# Patient Record
Sex: MALE | Race: WHITE | NOT HISPANIC OR LATINO | Employment: OTHER | URBAN - METROPOLITAN AREA
[De-identification: names, ages, dates, MRNs, and addresses within clinical notes are randomized per-mention and may not be internally consistent; named-entity substitution may affect disease eponyms.]

---

## 2017-08-09 ENCOUNTER — APPOINTMENT (OUTPATIENT)
Dept: LAB | Facility: MEDICAL CENTER | Age: 72
End: 2017-08-09
Attending: ANESTHESIOLOGY
Payer: MEDICARE

## 2017-08-09 ENCOUNTER — HOSPITAL ENCOUNTER (OUTPATIENT)
Dept: RADIOLOGY | Facility: MEDICAL CENTER | Age: 72
End: 2017-08-09
Attending: ANESTHESIOLOGY
Payer: MEDICARE

## 2017-08-09 DIAGNOSIS — M51.16 INTERVERTEBRAL DISC DISORDERS WITH RADICULOPATHY, LUMBAR REGION: ICD-10-CM

## 2017-08-09 PROCEDURE — 72148 MRI LUMBAR SPINE W/O DYE: CPT

## 2017-11-27 ENCOUNTER — OFFICE VISIT (OUTPATIENT)
Dept: MEDICAL GROUP | Facility: MEDICAL CENTER | Age: 72
End: 2017-11-27
Payer: MEDICARE

## 2017-11-27 VITALS
HEART RATE: 69 BPM | SYSTOLIC BLOOD PRESSURE: 128 MMHG | HEIGHT: 70 IN | OXYGEN SATURATION: 95 % | WEIGHT: 176 LBS | BODY MASS INDEX: 25.2 KG/M2 | TEMPERATURE: 97.7 F | DIASTOLIC BLOOD PRESSURE: 70 MMHG | RESPIRATION RATE: 16 BRPM

## 2017-11-27 DIAGNOSIS — Z00.00 ROUTINE GENERAL MEDICAL EXAMINATION AT A HEALTH CARE FACILITY: ICD-10-CM

## 2017-11-27 DIAGNOSIS — G89.29 CHRONIC BILATERAL LOW BACK PAIN WITH LEFT-SIDED SCIATICA: ICD-10-CM

## 2017-11-27 DIAGNOSIS — R73.01 IMPAIRED FASTING GLUCOSE: ICD-10-CM

## 2017-11-27 DIAGNOSIS — Z23 NEED FOR PNEUMOCOCCAL VACCINATION: ICD-10-CM

## 2017-11-27 DIAGNOSIS — N52.9 ERECTILE DYSFUNCTION, UNSPECIFIED ERECTILE DYSFUNCTION TYPE: ICD-10-CM

## 2017-11-27 DIAGNOSIS — M54.42 CHRONIC BILATERAL LOW BACK PAIN WITH LEFT-SIDED SCIATICA: ICD-10-CM

## 2017-11-27 PROCEDURE — 90670 PCV13 VACCINE IM: CPT | Performed by: NURSE PRACTITIONER

## 2017-11-27 PROCEDURE — 99214 OFFICE O/P EST MOD 30 MIN: CPT | Mod: 25 | Performed by: NURSE PRACTITIONER

## 2017-11-27 PROCEDURE — G0009 ADMIN PNEUMOCOCCAL VACCINE: HCPCS | Performed by: NURSE PRACTITIONER

## 2017-11-27 RX ORDER — SILDENAFIL 50 MG/1
50 TABLET, FILM COATED ORAL PRN
Qty: 3 TAB | Refills: 11 | Status: SHIPPED | OUTPATIENT
Start: 2017-11-27 | End: 2018-05-23

## 2017-11-27 ASSESSMENT — ENCOUNTER SYMPTOMS: BACK PAIN: 1

## 2017-11-27 NOTE — PROGRESS NOTES
Subjective:      Bayron Parikh is a 72 y.o. male who presents with Other (personal issue)            HPI Bayron Parikh Is here today mainly to discuss treatment for erectile dysfunction.      1. Erectile dysfunction, unspecified erectile dysfunction type  Patient states he has noticed decreased ability to maintain an erection over the past year. He states he is sexually monogamous with one partner. He has no history of diabetes or dyslipidemia and has never had testosterone checked. He is on chronic narcotic pain medication. His last blood work from 2016 was normal except for mild elevation in blood sugar. He would like to try medication for intercourse.    2. Impaired fasting glucose  Most recent blood sugar mildly elevated at 110 and patient advised it needs to be rechecked.    3. Chronic bilateral low back pain with left-sided sciatica  Patient continues to follow with pain management who is prescribing his morphine and oxycodone. He states he plans on decreasing the amount of narcotics he is using regularly but currently needs the medicine for pain control.    4. Routine general medical examination at a health care facility  Patient due for yearly blood work.    5. Need for pneumococcal vaccination  Patient does not remember ever receiving pneumonia vaccine.  Current Outpatient Prescriptions   Medication Sig Dispense Refill   • sildenafil citrate (VIAGRA) 50 MG tablet Take 1 Tab by mouth as needed for Erectile Dysfunction. 3 Tab 11   • morphine ER (MS CONTIN) 15 MG Tab CR tablet Take 1 Tab by mouth 3 times a day.     • oxycodone-acetaminophen (PERCOCET-10)  MG Tab Take 1-2 Tabs by mouth every 6 hours as needed for Severe Pain.       No current facility-administered medications for this visit.      Social History   Substance Use Topics   • Smoking status: Former Smoker     Years: 30.00     Types: Cigarettes     Quit date: 11/1/2000   • Smokeless tobacco: Never Used   • Alcohol use No     Family History  "  Problem Relation Age of Onset   • Cancer Father      throat?   • Other Mother      \"old age\"     Past Medical History:   Diagnosis Date   • Asthma        Review of Systems   Musculoskeletal: Positive for back pain.   All other systems reviewed and are negative.         Objective:     /70   Pulse 69   Temp 36.5 °C (97.7 °F)   Resp 16   Ht 1.778 m (5' 10\")   Wt 79.8 kg (176 lb)   SpO2 95%   BMI 25.25 kg/m²      Physical Exam   Constitutional: He is oriented to person, place, and time. He appears well-developed and well-nourished. No distress.   HENT:   Head: Normocephalic and atraumatic.   Right Ear: External ear normal.   Left Ear: External ear normal.   Nose: Nose normal.   Mouth/Throat: Oropharynx is clear and moist.   Eyes: Conjunctivae are normal. Right eye exhibits no discharge. Left eye exhibits no discharge.   Neck: Normal range of motion. Neck supple. No tracheal deviation present. No thyromegaly present.   Cardiovascular: Normal rate, regular rhythm and normal heart sounds.    No murmur heard.  Pulmonary/Chest: Effort normal and breath sounds normal. No respiratory distress. He has no wheezes. He has no rales.   Lymphadenopathy:     He has no cervical adenopathy.   Neurological: He is alert and oriented to person, place, and time. Coordination normal.   Skin: Skin is warm and dry. No rash noted. He is not diaphoretic. No erythema.   Psychiatric: He has a normal mood and affect. His behavior is normal. Judgment and thought content normal.   Nursing note and vitals reviewed.              Assessment/Plan:     1. Erectile dysfunction, unspecified erectile dysfunction type  I reviewed with patient the benefits and dangers with using medicines such as Viagra and he would like to try the medicine anyway and therefore a prescription was provided today. I would like to also look for underlying dyslipidemia or diabetes. I told him if it does not help her symptoms worsen I would recommend going to " urology.  - sildenafil citrate (VIAGRA) 50 MG tablet; Take 1 Tab by mouth as needed for Erectile Dysfunction.  Dispense: 3 Tab; Refill: 11    2. Impaired fasting glucose  Last blood sugar mildly elevated so I will check for prediabetes.  - HEMOGLOBIN A1C; Future    3. Chronic bilateral low back pain with left-sided sciatica  Patient will continue to follow with pain management was prescribing his medicines and I advised him it would be best if he can wean down on his narcotic medication which can cause side effects.    4. Routine general medical examination at a health care facility  Patient will do lab work before his next visit.  - COMP METABOLIC PANEL; Future  - LIPID PROFILE; Future  - TSH; Future    5. Need for pneumococcal vaccination  I have placed the below orders and discussed them with an approved delegating provider. The MA is performing the below orders under the direction of Dr. Leonardo Alvarado 13 PCV-13

## 2017-12-29 ENCOUNTER — TELEPHONE (OUTPATIENT)
Dept: MEDICAL GROUP | Facility: MEDICAL CENTER | Age: 72
End: 2017-12-29

## 2017-12-29 NOTE — TELEPHONE ENCOUNTER
PVP WITH OUTREACH  Future Appointments       Provider Department Center    1/10/2018 8:00 AM DALLAS Elam; [facility] [address]          ORIGINAL PVP AWV // PVP WITHOUT OUTREACH  ANNUAL WELLNESS VISIT PRE-VISIT PLANNING     1.  Reviewed note from last office visit with PCP: YES Last office visit 11/27/17    2.  If any orders were placed at last visit, do we have Results/Consult Notes?        •  Labs - Labs ordered, completed on 01/03/18 and results are in chart.       •  Imaging - Imaging was not ordered at last office visit.       •  Referrals - No referrals were ordered at last office visit.    3.  Immunizations were updated in [employer] using [employer]?: Yes       •  [employer] Recommendations: TDAP and ZOSTAVAX (Shingles)       •  Is patient due for Tdap? YES. Patient was not notified of copay/out of pocket cost.       •  Is patient due for Shingles? YES. Patient was not notified of copay/out of pocket cost.     4.  Patient is due for the following Health Maintenance Topics:   Health Maintenance Due   Topic Date Due   • Annual Wellness Visit  SCHEDULED FOR 01/10/17   • IMM DTaP/Tdap/Td Vaccine (1 - Tdap) 12/29/2017     5.  Reviewed/Updated the following with patient:       •   Preferred Pharmacy? NO       •   Preferred Lab? NO       •   Preferred Communication? NO       •   Allergies? NO       •   Medications? NO       •   Social History? NO       •   Family History (document living status of immediate family members and if + hx of cancer, diabetes, hypertension, hyperlipidemia, heart attack, stroke) NO    6.  Care Team Updated:       •   DME Company (gait device, O2, CPAP, etc.): NO       •   Other Specialists (eye doctor, derm, GYN, cardiology, endo, etc): NO    7.  Patient has the following Care Path diagnoses on Problem List:  NONE    8.  Specialty Comments was updated with diagnosis information provided by [facility]: [facility]    9.  Patient was not advised: “This is a free wellness visit.  The provider will screen for medical conditions to help you stay healthy. If you have other concerns to address you may be asked to discuss these at a separate visit or there may be an additional fee.”     6.  Patient was NOT informed to arrive 15 min prior to their scheduled appointment and bring in their medication bottles.

## 2018-01-03 ENCOUNTER — HOSPITAL ENCOUNTER (OUTPATIENT)
Dept: LAB | Facility: MEDICAL CENTER | Age: 73
End: 2018-01-03
Attending: NURSE PRACTITIONER
Payer: MEDICARE

## 2018-01-03 DIAGNOSIS — R73.01 IMPAIRED FASTING GLUCOSE: ICD-10-CM

## 2018-01-03 DIAGNOSIS — Z00.00 ROUTINE GENERAL MEDICAL EXAMINATION AT A HEALTH CARE FACILITY: ICD-10-CM

## 2018-01-03 LAB
ALBUMIN SERPL BCP-MCNC: 4.1 G/DL (ref 3.2–4.9)
ALBUMIN/GLOB SERPL: 1.3 G/DL
ALP SERPL-CCNC: 70 U/L (ref 30–99)
ALT SERPL-CCNC: 17 U/L (ref 2–50)
ANION GAP SERPL CALC-SCNC: 5 MMOL/L (ref 0–11.9)
AST SERPL-CCNC: 16 U/L (ref 12–45)
BILIRUB SERPL-MCNC: 0.5 MG/DL (ref 0.1–1.5)
BUN SERPL-MCNC: 17 MG/DL (ref 8–22)
CALCIUM SERPL-MCNC: 9.9 MG/DL (ref 8.5–10.5)
CHLORIDE SERPL-SCNC: 104 MMOL/L (ref 96–112)
CHOLEST SERPL-MCNC: 148 MG/DL (ref 100–199)
CO2 SERPL-SCNC: 30 MMOL/L (ref 20–33)
CREAT SERPL-MCNC: 0.92 MG/DL (ref 0.5–1.4)
EST. AVERAGE GLUCOSE BLD GHB EST-MCNC: 123 MG/DL
GFR SERPL CREATININE-BSD FRML MDRD: >60 ML/MIN/1.73 M 2
GLOBULIN SER CALC-MCNC: 3.2 G/DL (ref 1.9–3.5)
GLUCOSE SERPL-MCNC: 95 MG/DL (ref 65–99)
HBA1C MFR BLD: 5.9 % (ref 0–5.6)
HDLC SERPL-MCNC: 41 MG/DL
LDLC SERPL CALC-MCNC: 82 MG/DL
POTASSIUM SERPL-SCNC: 4.5 MMOL/L (ref 3.6–5.5)
PROT SERPL-MCNC: 7.3 G/DL (ref 6–8.2)
SODIUM SERPL-SCNC: 139 MMOL/L (ref 135–145)
TRIGL SERPL-MCNC: 126 MG/DL (ref 0–149)
TSH SERPL DL<=0.005 MIU/L-ACNC: 1.49 UIU/ML (ref 0.38–5.33)

## 2018-01-03 PROCEDURE — 83036 HEMOGLOBIN GLYCOSYLATED A1C: CPT | Mod: GA

## 2018-01-03 PROCEDURE — 84443 ASSAY THYROID STIM HORMONE: CPT | Mod: GA

## 2018-01-03 PROCEDURE — 80053 COMPREHEN METABOLIC PANEL: CPT

## 2018-01-03 PROCEDURE — 36415 COLL VENOUS BLD VENIPUNCTURE: CPT

## 2018-01-03 PROCEDURE — 80061 LIPID PANEL: CPT | Mod: GA

## 2018-01-09 NOTE — TELEPHONE ENCOUNTER
Left message for patient to call back regarding AWV pre-visit planning. Please transfer call to [phone].

## 2018-01-10 ENCOUNTER — OFFICE VISIT (OUTPATIENT)
Dept: MEDICAL GROUP | Facility: MEDICAL CENTER | Age: 73
End: 2018-01-10
Payer: MEDICARE

## 2018-01-10 VITALS
SYSTOLIC BLOOD PRESSURE: 122 MMHG | HEART RATE: 74 BPM | WEIGHT: 180 LBS | HEIGHT: 68 IN | OXYGEN SATURATION: 92 % | TEMPERATURE: 98.2 F | BODY MASS INDEX: 27.28 KG/M2 | RESPIRATION RATE: 16 BRPM | DIASTOLIC BLOOD PRESSURE: 64 MMHG

## 2018-01-10 DIAGNOSIS — Z23 NEED FOR TDAP VACCINATION: ICD-10-CM

## 2018-01-10 DIAGNOSIS — M54.42 CHRONIC BILATERAL LOW BACK PAIN WITH LEFT-SIDED SCIATICA: ICD-10-CM

## 2018-01-10 DIAGNOSIS — N52.9 ERECTILE DYSFUNCTION, UNSPECIFIED ERECTILE DYSFUNCTION TYPE: ICD-10-CM

## 2018-01-10 DIAGNOSIS — G89.29 CHRONIC BILATERAL LOW BACK PAIN WITH LEFT-SIDED SCIATICA: ICD-10-CM

## 2018-01-10 DIAGNOSIS — L30.9 ECZEMA, UNSPECIFIED TYPE: ICD-10-CM

## 2018-01-10 DIAGNOSIS — F51.01 PRIMARY INSOMNIA: ICD-10-CM

## 2018-01-10 DIAGNOSIS — Z00.00 MEDICARE ANNUAL WELLNESS VISIT, SUBSEQUENT: ICD-10-CM

## 2018-01-10 PROCEDURE — G0439 PPPS, SUBSEQ VISIT: HCPCS | Performed by: NURSE PRACTITIONER

## 2018-01-10 PROCEDURE — 99999 PR ANNUAL WELLNESS VISIT-INCLUDES PPPS SUBSEQUE*: CPT | Performed by: NURSE PRACTITIONER

## 2018-01-10 RX ORDER — FLUOCINONIDE TOPICAL SOLUTION USP, 0.05% 0.5 MG/ML
SOLUTION TOPICAL
COMMUNITY
Start: 2015-07-08 | End: 2018-01-10

## 2018-01-10 RX ORDER — KETOCONAZOLE 20 MG/ML
SHAMPOO TOPICAL
COMMUNITY
Start: 2015-07-08 | End: 2018-05-23

## 2018-01-10 RX ORDER — CLONIDINE HYDROCHLORIDE 0.1 MG/1
0.1 TABLET ORAL 2 TIMES DAILY PRN
Refills: 2 | COMMUNITY
Start: 2017-12-23 | End: 2019-09-25

## 2018-01-10 RX ORDER — DIAZEPAM 10 MG/1
10 TABLET ORAL EVERY 6 HOURS PRN
Qty: 30 TAB | Refills: 0 | COMMUNITY
Start: 2018-01-10 | End: 2019-02-13

## 2018-01-10 ASSESSMENT — PAIN SCALES - GENERAL: PAINLEVEL: 2=MINIMAL-SLIGHT

## 2018-01-10 ASSESSMENT — PATIENT HEALTH QUESTIONNAIRE - PHQ9: CLINICAL INTERPRETATION OF PHQ2 SCORE: 0

## 2018-01-10 NOTE — PROGRESS NOTES
Chief Complaint   Patient presents with   • Annual Wellness Visit         HPI:  Bayron is a 72 y.o. here for Medicare Annual Wellness Visit       1. Medicare annual wellness visit, subsequent  Screening performed below.    2. Erectile dysfunction, unspecified erectile dysfunction type  Patient has age related erectile dysfunction and it may also be compounded by his back pain and narcotics. He was given a prescription for Viagra on his last visit which he states he had used in the past successfully. He has not filled this yet due to cost.    3. Chronic bilateral low back pain with left-sided sciatica  Patient continues to follow with pain management for low back pain secondary to an injury years ago. He is currently on Percocet, morphine and Valium through pain management. He also gets occasional epidurals which help short-term. He denies loss of bowel or bladder function.    4. Eczema, unspecified type  Patient has had eczema for years and uses a steroid cream as needed.    5. Primary insomnia  Patient currently on Valium which he states he uses a half to one tablet when necessary through pain management.        Patient Active Problem List    Diagnosis Date Noted   • Erectile dysfunction 11/27/2017   • Chronic bilateral low back pain with left-sided sciatica 12/08/2016   • Eczema 12/08/2016   • Insomnia 12/08/2016       Current Outpatient Prescriptions   Medication Sig Dispense Refill   • fluocinonide (LIDEX) 0.05 % external solution      • ketoconazole (NIZORAL) 2 % shampoo      • clonidine (CATAPRES) 0.1 MG Tab Take 0.1 mg by mouth 2 Times a Day.  2   • sildenafil citrate (VIAGRA) 50 MG tablet Take 1 Tab by mouth as needed for Erectile Dysfunction. 3 Tab 11   • morphine ER (MS CONTIN) 15 MG Tab CR tablet Take 1 Tab by mouth 3 times a day.     • oxycodone-acetaminophen (PERCOCET-10)  MG Tab Take 1-2 Tabs by mouth every 6 hours as needed for Severe Pain.       No current facility-administered medications for  this visit.         Patient is taking medications as noted in medication list.  Current supplements as per medication list.     Allergies: Patient has no known allergies.    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 No, due for TDAP and ZOSTAVAX (Shingles). Patient is interested in receiving NONE today.    He  reports that he quit smoking about 17 years ago. His smoking use included Cigarettes. He quit after 30.00 years of use. He has never used smokeless tobacco. He reports that he does not drink alcohol or use drugs.  Counseling given: Not Answered        DPA/Advanced directive: Patient has Advanced Directive, but it is not on file. Instructed to bring in a copy to scan into their chart.    ROS:    Gait: Uses no assistive device   Ostomy: no   Other tubes: no   Amputations: no   Chronic oxygen use no   Last eye exam 6 months ago   Wears hearing aids: no   : Denies any urinary leakage during the last 6 months       Depression Screening    Little interest or pleasure in doing things?  0 - not at all  Feeling down, depressed, or hopeless? 0 - not at all  Patient Health Questionnaire Score: 0    If depressive symptoms identified deferred to follow up visit unless specifically addressed in assessment and plan.    Interpretation of PHQ-9 Total Score   Score Severity   1-4 No Depression   5-9 Mild Depression   10-14 Moderate Depression   15-19 Moderately Severe Depression   20-27 Severe Depression    Screening for Cognitive Impairment    Three Minute Recall (apple, watch, lani)  3/3 Village, Kitchen, Baby  Draw clock face with all 12 numbers set to the hand to show 10 minutes past 11 o'clock  0 3/5 Time 3:40  If cognitive concerns identified, deferred for follow up unless specifically addressed in assessment and plan.    Fall Risk Assessment    Has the patient had two or more falls in the last year or any fall with injury in the  "last year?  No  If fall risk identified, deferred for follow up unless specifically addressed in assessment and plan.    Safety Assessment    Throw rugs on floor.  No  Handrails on all stairs.  Yes  Good lighting in all hallways.  Yes  Difficulty hearing.  No  Patient counseled about all safety risks that were identified.    Functional Assessment ADLs    Are there any barriers preventing you from cooking for yourself or meeting nutritional needs?  No.    Are there any barriers preventing you from driving safely or obtaining transportation?  No.    Are there any barriers preventing you from using a telephone or calling for help?  No.    Are there any barriers preventing you from shopping?  No.    Are there any barriers preventing you from taking care of your own finances?  No.    Are there any barriers preventing you from managing your medications?  No.    Are you currently engaging any exercise or physical activity?  Yes.  Walking 3-4x per wk    Health Maintenance Summary                IMM DTaP/Tdap/Td Vaccine Overdue 5/1/1962     Annual Wellness Visit Overdue 12/29/2017      Done 12/28/2016 Visit Dx: Medicare annual wellness visit, subsequent    IMM ZOSTER VACCINE Postponed 12/12/2018 Originally 5/1/2003. Patient Refused    COLONOSCOPY Next Due 11/21/2018      Done 11/21/2008     IMM PNEUMOCOCCAL 65+ (ADULT) LOW/MEDIUM RISK SERIES Next Due 11/27/2018      Done 11/27/2017 Imm Admin: Pneumococcal Conjugate Vaccine (Prevnar/PCV-13)          Patient Care Team:  DALLAS Elam as PCP - General (Family Medicine)  Jaret Saunders M.D. (Anesthesia)    Social History   Substance Use Topics   • Smoking status: Former Smoker     Years: 30.00     Types: Cigarettes     Quit date: 11/1/2000   • Smokeless tobacco: Never Used   • Alcohol use No     Family History   Problem Relation Age of Onset   • Cancer Father      throat?   • Other Mother      \"old age\"     He  has a past medical history of Asthma.   No past surgical " "history on file.        Exam:     Blood pressure 122/64, pulse 74, temperature 36.8 °C (98.2 °F), resp. rate 16, height 1.727 m (5' 8\"), weight 81.6 kg (180 lb), SpO2 92 %. Body mass index is 27.37 kg/m².    Hearing excellent.    Dentition upper and lower dentures  Alert, oriented in no acute distress.  Eye contact is good, speech goal directed, affect calm      Assessment and Plan. The following treatment and monitoring plan is recommended:        1. Medicare annual wellness visit, subsequent  Annual wellness topics discussed, review of chronic medical problems completed    - Annual Wellness Visit - Includes PPPS Subsequent ()    2. Erectile dysfunction, unspecified erectile dysfunction type  Patient has a prescription for medication to use if needed and reports no side effects from the medicine.    3. Chronic bilateral low back pain with left-sided sciatica  Patient will continue to get her controlled substances from pain management. I did advise him to consider weaning off the diazepam because of the potential interactions with his narcotics.    4. Eczema, unspecified type  Patient will continue with steroid cream as needed.    5. Primary insomnia  Medication listed below is prescribed through pain management and I recommended weaning off the medicine and considering more natural ways to help sleep.  - diazepam (VALIUM) 10 MG tablet; Take 1 Tab by mouth every 6 hours as needed for Anxiety.  Dispense: 30 Tab; Refill: 0    6. Need for Tdap vaccination  Patient left before receiving Td.  - Misc. Devices Misc; TDaP IM through pharmacy  Dispense: 1 Each; Refill: 11  Services suggested: No services needed at this time  Health Care Screening recommendations as per orders if indicated.  Referrals offered: PT/OT/Nutrition counseling/Behavioral Health/Smoking cessation as per orders if indicated.    Discussion today about general wellness and lifestyle habits:    · Prevent falls and reduce trip hazards; Cautioned " about securing or removing rugs.  · Have a working fire alarm and carbon monoxide detector;   · Engage in regular physical activity and social activities       Follow-up: No Follow-up on file.

## 2018-03-27 ENCOUNTER — OFFICE VISIT (OUTPATIENT)
Dept: MEDICAL GROUP | Facility: MEDICAL CENTER | Age: 73
End: 2018-03-27
Payer: MEDICARE

## 2018-03-27 VITALS
BODY MASS INDEX: 27.28 KG/M2 | TEMPERATURE: 98.6 F | HEART RATE: 76 BPM | SYSTOLIC BLOOD PRESSURE: 124 MMHG | HEIGHT: 68 IN | WEIGHT: 180 LBS | OXYGEN SATURATION: 94 % | RESPIRATION RATE: 16 BRPM | DIASTOLIC BLOOD PRESSURE: 76 MMHG

## 2018-03-27 DIAGNOSIS — B02.9 HERPES ZOSTER WITHOUT COMPLICATION: ICD-10-CM

## 2018-03-27 PROCEDURE — 99214 OFFICE O/P EST MOD 30 MIN: CPT | Performed by: FAMILY MEDICINE

## 2018-03-27 RX ORDER — VALACYCLOVIR HYDROCHLORIDE 1 G/1
1000 TABLET, FILM COATED ORAL 3 TIMES DAILY
Qty: 21 TAB | Refills: 0 | Status: SHIPPED | OUTPATIENT
Start: 2018-03-27 | End: 2018-04-03

## 2018-03-27 NOTE — PROGRESS NOTES
cc: Rash    Subjective:     Bayron Parikh is a 72 y.o. male presenting with a rash. Started yesterday on his left chest and back. Seems to be somewhat worse today, has developed some blisters now. It is quite itchy and painful. Has tried nothing for this. Denies any swelling, fevers, lesions elsewhere. He has had the chickenpox in the past. He believes he's had the shingles vaccine in the past, but is not sure.    Review of systems:  See above.         Current Outpatient Prescriptions:   •  valacyclovir (VALTREX) 1 GM Tab, Take 1 Tab by mouth 3 times a day for 7 days., Disp: 21 Tab, Rfl: 0  •  ketoconazole (NIZORAL) 2 % shampoo, , Disp: , Rfl:   •  clonidine (CATAPRES) 0.1 MG Tab, Take 0.1 mg by mouth 2 Times a Day., Disp: , Rfl: 2  •  diazepam (VALIUM) 10 MG tablet, Take 1 Tab by mouth every 6 hours as needed for Anxiety., Disp: 30 Tab, Rfl: 0  •  sildenafil citrate (VIAGRA) 50 MG tablet, Take 1 Tab by mouth as needed for Erectile Dysfunction., Disp: 3 Tab, Rfl: 11  •  morphine ER (MS CONTIN) 15 MG Tab CR tablet, Take 1 Tab by mouth 3 times a day., Disp: , Rfl:   •  oxycodone-acetaminophen (PERCOCET-10)  MG Tab, Take 1-2 Tabs by mouth every 6 hours as needed for Severe Pain., Disp: , Rfl:     No Known Allergies    Past Medical History:   Diagnosis Date   • Asthma      History reviewed. No pertinent surgical history.  Family History   Problem Relation Age of Onset   • Cancer Father      Esophogial/smoker   • No Known Problems Mother    • No Known Problems Brother    • No Known Problems Son    • No Known Problems Daughter    • No Known Problems Daughter    • No Known Problems Maternal Grandmother    • No Known Problems Maternal Grandfather    • No Known Problems Paternal Grandmother    • No Known Problems Paternal Grandfather      Social History     Social History   • Marital status: Single     Spouse name: N/A   • Number of children: N/A   • Years of education: N/A     Occupational History   • Not on file.  "    Social History Main Topics   • Smoking status: Former Smoker     Packs/day: 1.00     Years: 39.00     Types: Cigarettes     Quit date: 11/1/2000   • Smokeless tobacco: Never Used      Comment: started smoking at age 16   • Alcohol use No   • Drug use: No   • Sexual activity: Yes     Partners: Female     Other Topics Concern   • Not on file     Social History Narrative   • No narrative on file       Objective:     Vitals: /76   Pulse 76   Temp 37 °C (98.6 °F)   Resp 16   Ht 1.727 m (5' 8\")   Wt 81.6 kg (180 lb)   SpO2 94%   BMI 27.37 kg/m²   General: Alert, pleasant, NAD  HEENT: Normocephalic.    Skin: Warm, dry. Clustered fluid-filled papules on an erythematous base scattered in a T5 dermatomal distribution on his left chest and back   Psych:  Affect/mood is normal, judgement is good, memory is intact, grooming is appropriate.    Assessment/Plan:     Bayron was seen today for herpes zoster.    Diagnoses and all orders for this visit:    Herpes zoster without complication  Discussed shingles diagnosis, avoiding exposure to others. We'll also start a course of Valtrex. He is already on chronic pain medications. Could consider adding gabapentin if he has any residual postherpetic neuralgia.  -     valacyclovir (VALTREX) 1 GM Tab; Take 1 Tab by mouth 3 times a day for 7 days.        Return if symptoms worsen or fail to improve.  "

## 2018-05-23 ENCOUNTER — HOSPITAL ENCOUNTER (INPATIENT)
Facility: MEDICAL CENTER | Age: 73
LOS: 4 days | DRG: 871 | End: 2018-05-27
Attending: EMERGENCY MEDICINE | Admitting: HOSPITALIST
Payer: MEDICARE

## 2018-05-23 ENCOUNTER — APPOINTMENT (OUTPATIENT)
Dept: RADIOLOGY | Facility: MEDICAL CENTER | Age: 73
DRG: 871 | End: 2018-05-23
Attending: EMERGENCY MEDICINE
Payer: MEDICARE

## 2018-05-23 DIAGNOSIS — J18.9 PNEUMONIA OF RIGHT UPPER LOBE DUE TO INFECTIOUS ORGANISM: ICD-10-CM

## 2018-05-23 PROBLEM — A41.9 SEPSIS (HCC): Status: ACTIVE | Noted: 2018-05-23

## 2018-05-23 PROBLEM — B02.29 POST HERPETIC NEURALGIA: Status: ACTIVE | Noted: 2018-05-23

## 2018-05-23 LAB
ALBUMIN SERPL BCP-MCNC: 3.9 G/DL (ref 3.2–4.9)
ALBUMIN/GLOB SERPL: 1.3 G/DL
ALP SERPL-CCNC: 71 U/L (ref 30–99)
ALT SERPL-CCNC: 16 U/L (ref 2–50)
ANION GAP SERPL CALC-SCNC: 9 MMOL/L (ref 0–11.9)
APPEARANCE UR: CLEAR
AST SERPL-CCNC: 16 U/L (ref 12–45)
BACTERIA #/AREA URNS HPF: NEGATIVE /HPF
BASOPHILS # BLD AUTO: 0.4 % (ref 0–1.8)
BASOPHILS # BLD: 0.11 K/UL (ref 0–0.12)
BILIRUB SERPL-MCNC: 0.5 MG/DL (ref 0.1–1.5)
BILIRUB UR QL STRIP.AUTO: NEGATIVE
BNP SERPL-MCNC: 58 PG/ML (ref 0–100)
BUN SERPL-MCNC: 17 MG/DL (ref 8–22)
CALCIUM SERPL-MCNC: 9.5 MG/DL (ref 8.5–10.5)
CHLORIDE SERPL-SCNC: 104 MMOL/L (ref 96–112)
CO2 SERPL-SCNC: 24 MMOL/L (ref 20–33)
COLOR UR: YELLOW
CREAT SERPL-MCNC: 0.85 MG/DL (ref 0.5–1.4)
EKG IMPRESSION: NORMAL
EOSINOPHIL # BLD AUTO: 0.02 K/UL (ref 0–0.51)
EOSINOPHIL NFR BLD: 0.1 % (ref 0–6.9)
EPI CELLS #/AREA URNS HPF: NEGATIVE /HPF
ERYTHROCYTE [DISTWIDTH] IN BLOOD BY AUTOMATED COUNT: 43 FL (ref 35.9–50)
GLOBULIN SER CALC-MCNC: 3.1 G/DL (ref 1.9–3.5)
GLUCOSE SERPL-MCNC: 123 MG/DL (ref 65–99)
GLUCOSE UR STRIP.AUTO-MCNC: NEGATIVE MG/DL
HCT VFR BLD AUTO: 42.1 % (ref 42–52)
HGB BLD-MCNC: 14.6 G/DL (ref 14–18)
HYALINE CASTS #/AREA URNS LPF: ABNORMAL /LPF
IMM GRANULOCYTES # BLD AUTO: 0.17 K/UL (ref 0–0.11)
IMM GRANULOCYTES NFR BLD AUTO: 0.6 % (ref 0–0.9)
KETONES UR STRIP.AUTO-MCNC: ABNORMAL MG/DL
LACTATE BLD-SCNC: 2.4 MMOL/L (ref 0.5–2)
LACTATE BLD-SCNC: 2.6 MMOL/L (ref 0.5–2)
LEUKOCYTE ESTERASE UR QL STRIP.AUTO: ABNORMAL
LYMPHOCYTES # BLD AUTO: 1.07 K/UL (ref 1–4.8)
LYMPHOCYTES NFR BLD: 3.6 % (ref 22–41)
MCH RBC QN AUTO: 32.1 PG (ref 27–33)
MCHC RBC AUTO-ENTMCNC: 34.7 G/DL (ref 33.7–35.3)
MCV RBC AUTO: 92.5 FL (ref 81.4–97.8)
MICRO URNS: ABNORMAL
MONOCYTES # BLD AUTO: 2.48 K/UL (ref 0–0.85)
MONOCYTES NFR BLD AUTO: 8.4 % (ref 0–13.4)
NEUTROPHILS # BLD AUTO: 25.76 K/UL (ref 1.82–7.42)
NEUTROPHILS NFR BLD: 86.9 % (ref 44–72)
NITRITE UR QL STRIP.AUTO: NEGATIVE
NRBC # BLD AUTO: 0 K/UL
NRBC BLD-RTO: 0 /100 WBC
PH UR STRIP.AUTO: 6.5 [PH]
PLATELET # BLD AUTO: 242 K/UL (ref 164–446)
PMV BLD AUTO: 11.2 FL (ref 9–12.9)
POTASSIUM SERPL-SCNC: 4.1 MMOL/L (ref 3.6–5.5)
PROCALCITONIN SERPL-MCNC: 4 NG/ML
PROT SERPL-MCNC: 7 G/DL (ref 6–8.2)
PROT UR QL STRIP: NEGATIVE MG/DL
RBC # BLD AUTO: 4.55 M/UL (ref 4.7–6.1)
RBC # URNS HPF: ABNORMAL /HPF
RBC UR QL AUTO: NEGATIVE
SODIUM SERPL-SCNC: 137 MMOL/L (ref 135–145)
SP GR UR STRIP.AUTO: 1.02
TROPONIN I SERPL-MCNC: 0.01 NG/ML (ref 0–0.04)
UROBILINOGEN UR STRIP.AUTO-MCNC: 0.2 MG/DL
WBC # BLD AUTO: 29.6 K/UL (ref 4.8–10.8)
WBC #/AREA URNS HPF: ABNORMAL /HPF

## 2018-05-23 PROCEDURE — 94760 N-INVAS EAR/PLS OXIMETRY 1: CPT

## 2018-05-23 PROCEDURE — 84484 ASSAY OF TROPONIN QUANT: CPT

## 2018-05-23 PROCEDURE — 96374 THER/PROPH/DIAG INJ IV PUSH: CPT

## 2018-05-23 PROCEDURE — 99223 1ST HOSP IP/OBS HIGH 75: CPT | Mod: AI | Performed by: HOSPITALIST

## 2018-05-23 PROCEDURE — 96375 TX/PRO/DX INJ NEW DRUG ADDON: CPT

## 2018-05-23 PROCEDURE — 700105 HCHG RX REV CODE 258: Performed by: HOSPITALIST

## 2018-05-23 PROCEDURE — 83605 ASSAY OF LACTIC ACID: CPT

## 2018-05-23 PROCEDURE — 80053 COMPREHEN METABOLIC PANEL: CPT

## 2018-05-23 PROCEDURE — 700111 HCHG RX REV CODE 636 W/ 250 OVERRIDE (IP): Performed by: EMERGENCY MEDICINE

## 2018-05-23 PROCEDURE — 700101 HCHG RX REV CODE 250: Performed by: HOSPITALIST

## 2018-05-23 PROCEDURE — 700111 HCHG RX REV CODE 636 W/ 250 OVERRIDE (IP): Performed by: HOSPITALIST

## 2018-05-23 PROCEDURE — 700102 HCHG RX REV CODE 250 W/ 637 OVERRIDE(OP): Performed by: HOSPITALIST

## 2018-05-23 PROCEDURE — 71045 X-RAY EXAM CHEST 1 VIEW: CPT

## 2018-05-23 PROCEDURE — 83880 ASSAY OF NATRIURETIC PEPTIDE: CPT

## 2018-05-23 PROCEDURE — 87086 URINE CULTURE/COLONY COUNT: CPT

## 2018-05-23 PROCEDURE — 87040 BLOOD CULTURE FOR BACTERIA: CPT | Mod: 91

## 2018-05-23 PROCEDURE — A9270 NON-COVERED ITEM OR SERVICE: HCPCS | Performed by: HOSPITALIST

## 2018-05-23 PROCEDURE — 93005 ELECTROCARDIOGRAM TRACING: CPT | Performed by: EMERGENCY MEDICINE

## 2018-05-23 PROCEDURE — 36415 COLL VENOUS BLD VENIPUNCTURE: CPT

## 2018-05-23 PROCEDURE — 304561 HCHG STAT O2

## 2018-05-23 PROCEDURE — 84145 PROCALCITONIN (PCT): CPT

## 2018-05-23 PROCEDURE — 99285 EMERGENCY DEPT VISIT HI MDM: CPT

## 2018-05-23 PROCEDURE — 81001 URINALYSIS AUTO W/SCOPE: CPT

## 2018-05-23 PROCEDURE — 85025 COMPLETE CBC W/AUTO DIFF WBC: CPT

## 2018-05-23 PROCEDURE — 770006 HCHG ROOM/CARE - MED/SURG/GYN SEMI*

## 2018-05-23 PROCEDURE — 700105 HCHG RX REV CODE 258: Performed by: EMERGENCY MEDICINE

## 2018-05-23 PROCEDURE — 93005 ELECTROCARDIOGRAM TRACING: CPT

## 2018-05-23 RX ORDER — CLONIDINE HYDROCHLORIDE 0.1 MG/1
0.1 TABLET ORAL 2 TIMES DAILY
Status: DISCONTINUED | OUTPATIENT
Start: 2018-05-23 | End: 2018-05-27 | Stop reason: HOSPADM

## 2018-05-23 RX ORDER — CAPSAICIN 0.025 %
CREAM (GRAM) TOPICAL 3 TIMES DAILY
Status: DISCONTINUED | OUTPATIENT
Start: 2018-05-23 | End: 2018-05-27 | Stop reason: HOSPADM

## 2018-05-23 RX ORDER — SODIUM CHLORIDE 9 MG/ML
INJECTION, SOLUTION INTRAVENOUS CONTINUOUS
Status: DISCONTINUED | OUTPATIENT
Start: 2018-05-23 | End: 2018-05-25

## 2018-05-23 RX ORDER — DIAZEPAM 5 MG/1
10 TABLET ORAL EVERY 6 HOURS PRN
Status: DISCONTINUED | OUTPATIENT
Start: 2018-05-23 | End: 2018-05-27 | Stop reason: HOSPADM

## 2018-05-23 RX ORDER — AZITHROMYCIN 500 MG/1
500 INJECTION, POWDER, LYOPHILIZED, FOR SOLUTION INTRAVENOUS ONCE
Status: COMPLETED | OUTPATIENT
Start: 2018-05-23 | End: 2018-05-23

## 2018-05-23 RX ORDER — GABAPENTIN 300 MG/1
300 CAPSULE ORAL 3 TIMES DAILY
Status: ON HOLD | COMMUNITY
End: 2019-03-15

## 2018-05-23 RX ORDER — CEFTRIAXONE 2 G/1
2 INJECTION, POWDER, FOR SOLUTION INTRAMUSCULAR; INTRAVENOUS ONCE
Status: COMPLETED | OUTPATIENT
Start: 2018-05-23 | End: 2018-05-23

## 2018-05-23 RX ORDER — ACETAMINOPHEN 325 MG/1
650 TABLET ORAL EVERY 6 HOURS PRN
Status: DISCONTINUED | OUTPATIENT
Start: 2018-05-23 | End: 2018-05-27 | Stop reason: HOSPADM

## 2018-05-23 RX ORDER — BISACODYL 10 MG
10 SUPPOSITORY, RECTAL RECTAL
Status: DISCONTINUED | OUTPATIENT
Start: 2018-05-23 | End: 2018-05-27 | Stop reason: HOSPADM

## 2018-05-23 RX ORDER — POLYETHYLENE GLYCOL 3350 17 G/17G
1 POWDER, FOR SOLUTION ORAL
Status: DISCONTINUED | OUTPATIENT
Start: 2018-05-23 | End: 2018-05-27 | Stop reason: HOSPADM

## 2018-05-23 RX ORDER — SODIUM CHLORIDE 9 MG/ML
30 INJECTION, SOLUTION INTRAVENOUS
Status: DISCONTINUED | OUTPATIENT
Start: 2018-05-23 | End: 2018-05-27 | Stop reason: HOSPADM

## 2018-05-23 RX ORDER — SODIUM CHLORIDE 9 MG/ML
1000 INJECTION, SOLUTION INTRAVENOUS ONCE
Status: COMPLETED | OUTPATIENT
Start: 2018-05-23 | End: 2018-05-23

## 2018-05-23 RX ORDER — AMOXICILLIN 250 MG
2 CAPSULE ORAL 2 TIMES DAILY
Status: DISCONTINUED | OUTPATIENT
Start: 2018-05-23 | End: 2018-05-27 | Stop reason: HOSPADM

## 2018-05-23 RX ORDER — MORPHINE SULFATE 15 MG/1
15 TABLET, FILM COATED, EXTENDED RELEASE ORAL 3 TIMES DAILY
Status: DISCONTINUED | OUTPATIENT
Start: 2018-05-23 | End: 2018-05-27 | Stop reason: HOSPADM

## 2018-05-23 RX ORDER — LIDOCAINE HYDROCHLORIDE 40 MG/ML
1 SOLUTION TOPICAL 2 TIMES DAILY PRN
Status: DISCONTINUED | OUTPATIENT
Start: 2018-05-23 | End: 2018-05-27 | Stop reason: HOSPADM

## 2018-05-23 RX ORDER — SODIUM CHLORIDE 9 MG/ML
500 INJECTION, SOLUTION INTRAVENOUS
Status: DISCONTINUED | OUTPATIENT
Start: 2018-05-23 | End: 2018-05-27 | Stop reason: HOSPADM

## 2018-05-23 RX ORDER — ONDANSETRON 2 MG/ML
4 INJECTION INTRAMUSCULAR; INTRAVENOUS EVERY 4 HOURS PRN
Status: DISCONTINUED | OUTPATIENT
Start: 2018-05-23 | End: 2018-05-27 | Stop reason: HOSPADM

## 2018-05-23 RX ORDER — ONDANSETRON 4 MG/1
4 TABLET, ORALLY DISINTEGRATING ORAL EVERY 4 HOURS PRN
Status: DISCONTINUED | OUTPATIENT
Start: 2018-05-23 | End: 2018-05-27 | Stop reason: HOSPADM

## 2018-05-23 RX ORDER — OXYCODONE AND ACETAMINOPHEN 10; 325 MG/1; MG/1
1-2 TABLET ORAL EVERY 6 HOURS PRN
Status: DISCONTINUED | OUTPATIENT
Start: 2018-05-23 | End: 2018-05-27 | Stop reason: HOSPADM

## 2018-05-23 RX ADMIN — MORPHINE SULFATE 15 MG: 15 TABLET, EXTENDED RELEASE ORAL at 20:16

## 2018-05-23 RX ADMIN — SODIUM CHLORIDE 1000 ML: 9 INJECTION, SOLUTION INTRAVENOUS at 13:15

## 2018-05-23 RX ADMIN — SODIUM CHLORIDE 1000 ML: 9 INJECTION, SOLUTION INTRAVENOUS at 12:40

## 2018-05-23 RX ADMIN — CLONIDINE HYDROCHLORIDE 0.1 MG: 0.1 TABLET ORAL at 20:16

## 2018-05-23 RX ADMIN — LIDOCAINE HYDROCHLORIDE 1 APPLICATION: 40 SOLUTION TOPICAL at 18:26

## 2018-05-23 RX ADMIN — OXYCODONE HYDROCHLORIDE AND ACETAMINOPHEN 2 TABLET: 10; 325 TABLET ORAL at 17:34

## 2018-05-23 RX ADMIN — AZITHROMYCIN FOR INJECTION INJECTION, POWDER, LYOPHILIZED, FOR SOLUTION 500 MG: 500 INJECTION INTRAVENOUS at 12:43

## 2018-05-23 RX ADMIN — CAPSAICIN: 0.25 CREAM TOPICAL at 18:29

## 2018-05-23 RX ADMIN — STANDARDIZED SENNA CONCENTRATE AND DOCUSATE SODIUM 2 TABLET: 8.6; 5 TABLET, FILM COATED ORAL at 20:16

## 2018-05-23 RX ADMIN — OXYCODONE HYDROCHLORIDE AND ACETAMINOPHEN 2 TABLET: 10; 325 TABLET ORAL at 23:37

## 2018-05-23 RX ADMIN — MORPHINE SULFATE 15 MG: 15 TABLET, EXTENDED RELEASE ORAL at 16:38

## 2018-05-23 RX ADMIN — CEFTRIAXONE 2 G: 2 INJECTION, POWDER, FOR SOLUTION INTRAMUSCULAR; INTRAVENOUS at 12:40

## 2018-05-23 RX ADMIN — LIDOCAINE HYDROCHLORIDE 1 APPLICATION: 40 SOLUTION TOPICAL at 22:05

## 2018-05-23 RX ADMIN — CAPSAICIN: 0.25 CREAM TOPICAL at 20:16

## 2018-05-23 RX ADMIN — SODIUM CHLORIDE: 9 INJECTION, SOLUTION INTRAVENOUS at 23:37

## 2018-05-23 RX ADMIN — SODIUM CHLORIDE: 9 INJECTION, SOLUTION INTRAVENOUS at 16:46

## 2018-05-23 ASSESSMENT — LIFESTYLE VARIABLES
SUBSTANCE_ABUSE: 0
EVER_SMOKED: YES
ALCOHOL_USE: NO

## 2018-05-23 ASSESSMENT — ENCOUNTER SYMPTOMS
FEVER: 1
COUGH: 1
DIARRHEA: 0
NERVOUS/ANXIOUS: 0
HEADACHES: 0
VOMITING: 0
PALPITATIONS: 0
DIZZINESS: 0
CHILLS: 1
SPUTUM PRODUCTION: 1
SORE THROAT: 0
DOUBLE VISION: 0
ABDOMINAL PAIN: 0
MYALGIAS: 1
SHORTNESS OF BREATH: 1
NAUSEA: 0
STRIDOR: 0

## 2018-05-23 ASSESSMENT — COPD QUESTIONNAIRES
DO YOU EVER COUGH UP ANY MUCUS OR PHLEGM?: YES, A FEW DAYS A WEEK OR MONTH
HAVE YOU SMOKED AT LEAST 100 CIGARETTES IN YOUR ENTIRE LIFE: YES
IN THE PAST 12 MONTHS DO YOU DO LESS THAN YOU USED TO BECAUSE OF YOUR BREATHING PROBLEMS: DISAGREE/UNSURE
COPD SCREENING SCORE: 6
DURING THE PAST 4 WEEKS HOW MUCH DID YOU FEEL SHORT OF BREATH: SOME OF THE TIME

## 2018-05-23 ASSESSMENT — PAIN SCALES - GENERAL
PAINLEVEL_OUTOF10: 7
PAINLEVEL_OUTOF10: 6
PAINLEVEL_OUTOF10: 4

## 2018-05-23 ASSESSMENT — PATIENT HEALTH QUESTIONNAIRE - PHQ9
2. FEELING DOWN, DEPRESSED, IRRITABLE, OR HOPELESS: NOT AT ALL
SUM OF ALL RESPONSES TO PHQ9 QUESTIONS 1 AND 2: 0
1. LITTLE INTEREST OR PLEASURE IN DOING THINGS: NOT AT ALL

## 2018-05-23 NOTE — ED NOTES
Patient sating 84%RA. Oxygen placed on pt, 4 liters via NC with current sat of 92%. Patient to EKG

## 2018-05-23 NOTE — ED NOTES
Per Dr Acevedo, all lesions appear well healed, no isolation precautions necessary for this patient.

## 2018-05-23 NOTE — ED PROVIDER NOTES
ED Provider Note    CHIEF COMPLAINT  Chief Complaint   Patient presents with   • Chest Pain   • Cough   • Shortness of Breath       HPI  Bayron Parikh is a 73 y.o. male who presents for evaluation of left lateral chest wall pain and low-grade fevers cough shortness of breath. Patient has a history of asthma. He recently had a bout of shingles in his left anterior lateral chest wall. The blistering subsided but he has some chronic pain in that area. He has no report of cardiopulmonary disease or asthma. He reports low-grade fever generalized weakness not feeling well and cough for the last 3-4 days. He is noted to be hypoxic in triage and brought back for evaluation. He denies any recent hospitalization no foreign travel. He denies leg swelling or hemoptysis no high fevers    REVIEW OF SYSTEMS  See HPI for further details. Positive for cough shortness of breath fevers All other systems are negative.     PAST MEDICAL HISTORY  Past Medical History:   Diagnosis Date   • Asthma        FAMILY HISTORY  Noncontributory    SOCIAL HISTORY  Social History     Social History   • Marital status: Single     Spouse name: N/A   • Number of children: N/A   • Years of education: N/A     Social History Main Topics   • Smoking status: Former Smoker     Packs/day: 1.00     Years: 39.00     Types: Cigarettes     Quit date: 11/1/2000   • Smokeless tobacco: Never Used      Comment: started smoking at age 16   • Alcohol use No   • Drug use: No   • Sexual activity: Yes     Partners: Female     Other Topics Concern   • Not on file     Social History Narrative   • No narrative on file     Former smoker  SURGICAL HISTORY  No past surgical history on file.    CURRENT MEDICATIONS    Current Facility-Administered Medications:   •  cefTRIAXone (ROCEPHIN) injection 2 g, 2 g, Intravenous, Once, Jacoby George M.D.  •  azithromycin (ZITHROMAX) injection 500 mg, 500 mg, Intravenous, Once, Jacoby George M.D.  •  NS infusion 1,000 mL, 1,000 mL,  Intravenous, Once, Jacoby George M.D.    Current Outpatient Prescriptions:   •  ketoconazole (NIZORAL) 2 % shampoo, , Disp: , Rfl:   •  clonidine (CATAPRES) 0.1 MG Tab, Take 0.1 mg by mouth 2 Times a Day., Disp: , Rfl: 2  •  diazepam (VALIUM) 10 MG tablet, Take 1 Tab by mouth every 6 hours as needed for Anxiety., Disp: 30 Tab, Rfl: 0  •  sildenafil citrate (VIAGRA) 50 MG tablet, Take 1 Tab by mouth as needed for Erectile Dysfunction., Disp: 3 Tab, Rfl: 11  •  morphine ER (MS CONTIN) 15 MG Tab CR tablet, Take 1 Tab by mouth 3 times a day., Disp: , Rfl:   •  oxycodone-acetaminophen (PERCOCET-10)  MG Tab, Take 1-2 Tabs by mouth every 6 hours as needed for Severe Pain., Disp: , Rfl:       ALLERGIES  No Known Allergies    PHYSICAL EXAM  VITAL SIGNS: /44   Pulse 97   Temp 36.9 °C (98.4 °F)   Resp 20   Wt 81.6 kg (180 lb)   SpO2 94%   BMI 27.37 kg/m²  Room air O2: 84    Constitutional: She appears ill  HENT: Normocephalic, Atraumatic, Bilateral external ears normal, Oropharynx moist, No oral exudates, Nose normal.   Eyes: PERRLA, EOMI, Conjunctiva normal, No discharge.   Neck: Normal range of motion, No tenderness, Supple, No stridor.   Cardiovascular: Tachycardic, Normal rhythm, No murmurs, No rubs, No gallops.   Thorax & Lungs: Scattered rhonchi, No chest tenderness.   Abdomen: Bowel sounds normal, Soft, No tenderness, No masses, No pulsatile masses.   Skin: Warm, Dry, No erythema, No rash.   Back: No tenderness, No CVA tenderness.   Extremities: Intact distal pulses, No edema, No tenderness, No cyanosis, No clubbing.   Neurologic: Alert & oriented x 3, Normal motor function, Normal sensory function, No focal deficits noted.   Psychiatric: Affect normal, Judgment normal, Mood normal.       RADIOLOGY/PROCEDURES  DX-CHEST-PORTABLE (1 VIEW)   Final Result      Right upper lobe consolidation suggestive of pneumonia. Recommend follow-up chest x-ray in one to 2 weeks after appropriate treatment to ensure  resolution.        Results for orders placed or performed during the hospital encounter of 05/23/18   Lactic acid (lactate)   Result Value Ref Range    Lactic Acid 2.6 (H) 0.5 - 2.0 mmol/L   CBC WITH DIFFERENTIAL   Result Value Ref Range    WBC 29.6 (H) 4.8 - 10.8 K/uL    RBC 4.55 (L) 4.70 - 6.10 M/uL    Hemoglobin 14.6 14.0 - 18.0 g/dL    Hematocrit 42.1 42.0 - 52.0 %    MCV 92.5 81.4 - 97.8 fL    MCH 32.1 27.0 - 33.0 pg    MCHC 34.7 33.7 - 35.3 g/dL    RDW 43.0 35.9 - 50.0 fL    Platelet Count 242 164 - 446 K/uL    MPV 11.2 9.0 - 12.9 fL    Neutrophils-Polys 86.90 (H) 44.00 - 72.00 %    Lymphocytes 3.60 (L) 22.00 - 41.00 %    Monocytes 8.40 0.00 - 13.40 %    Eosinophils 0.10 0.00 - 6.90 %    Basophils 0.40 0.00 - 1.80 %    Immature Granulocytes 0.60 0.00 - 0.90 %    Nucleated RBC 0.00 /100 WBC    Neutrophils (Absolute) 25.76 (H) 1.82 - 7.42 K/uL    Lymphs (Absolute) 1.07 1.00 - 4.80 K/uL    Monos (Absolute) 2.48 (H) 0.00 - 0.85 K/uL    Eos (Absolute) 0.02 0.00 - 0.51 K/uL    Baso (Absolute) 0.11 0.00 - 0.12 K/uL    Immature Granulocytes (abs) 0.17 (H) 0.00 - 0.11 K/uL    NRBC (Absolute) 0.00 K/uL   COMP METABOLIC PANEL   Result Value Ref Range    Sodium 137 135 - 145 mmol/L    Potassium 4.1 3.6 - 5.5 mmol/L    Chloride 104 96 - 112 mmol/L    Co2 24 20 - 33 mmol/L    Anion Gap 9.0 0.0 - 11.9    Glucose 123 (H) 65 - 99 mg/dL    Bun 17 8 - 22 mg/dL    Creatinine 0.85 0.50 - 1.40 mg/dL    Calcium 9.5 8.5 - 10.5 mg/dL    AST(SGOT) 16 12 - 45 U/L    ALT(SGPT) 16 2 - 50 U/L    Alkaline Phosphatase 71 30 - 99 U/L    Total Bilirubin 0.5 0.1 - 1.5 mg/dL    Albumin 3.9 3.2 - 4.9 g/dL    Total Protein 7.0 6.0 - 8.2 g/dL    Globulin 3.1 1.9 - 3.5 g/dL    A-G Ratio 1.3 g/dL   URINALYSIS   Result Value Ref Range    Color Yellow     Character Clear     Specific Gravity 1.020 <1.035    Ph 6.5 5.0 - 8.0    Glucose Negative Negative mg/dL    Ketones Trace (A) Negative mg/dL    Protein Negative Negative mg/dL    Bilirubin Negative  Negative    Urobilinogen, Urine 0.2 Negative    Nitrite Negative Negative    Leukocyte Esterase Trace (A) Negative    Occult Blood Negative Negative    Micro Urine Req Microscopic    TROPONIN   Result Value Ref Range    Troponin I 0.01 0.00 - 0.04 ng/mL   BNP   Result Value Ref Range    B Natriuretic Peptide 58 0 - 100 pg/mL   ESTIMATED GFR   Result Value Ref Range    GFR If African American >60 >60 mL/min/1.73 m 2    GFR If Non African American >60 >60 mL/min/1.73 m 2   URINE MICROSCOPIC (W/UA)   Result Value Ref Range    WBC 0-2 (A) /hpf    RBC 2-5 (A) /hpf    Bacteria Negative None /hpf    Epithelial Cells Negative /hpf    Hyaline Cast 3-5 (A) /lpf   EKG (ER)   Result Value Ref Range    Report       [facility] Emergency Dept.    Test Date:  2018  Pt Name:    JAMES LEMUS                  Department: ER  MRN:        [id number]                      Room:  Gender:     Male                         Technician: [id number]  :        1945                   Requested By:ER TRIAGE PROTOCOL  Order #:    [id number]                    Reading MD:    Measurements  Intervals                                Axis  Rate:       107                          P:          87  OK:         152                          QRS:        -81  QRSD:       102                          T:          68  QT:         344  QTc:        459    Interpretive Statements  SINUS TACHYCARDIA  BIATRIAL ABNORMALITIES  INCOMPLETE RBBB AND LAFB  RIGHT VENTRICULAR HYPERTROPHY  No previous ECG available for comparison          COURSE & MEDICAL DECISION MAKING  Pertinent Labs & Imaging studies reviewed. (See chart for details)  Patient presented here with hypoxia tachycardia tachypnea. His chest x-ray suggests right upper lobe pneumonia. He has profound leukocytosis with left shift. Lactic acid is slightly elevated at 2.6. He did not have any episodes of hypotension and his systolic blood pressure was consistently above 120. He is significantly  ill however. Blood cultures were performed given IV fluids Rocephin and azithromycin will be admitted to internal medicine.    FINAL IMPRESSION  1. Sepsis, compensated  2. Community-acquired pneumonia    Admission       Electronically signed by: Jacoby George, 5/23/2018 12:12 PM

## 2018-05-23 NOTE — ED TRIAGE NOTES
Patient presents to [facility] from triage via wheelchair for c/o chest pain.     This RN in to see patient and agree with triage note. Patient placed on precautions 2/2 shingles outbreak in March and recurrent episode of shingles this month.

## 2018-05-24 PROBLEM — D69.6 THROMBOCYTOPENIA (HCC): Status: ACTIVE | Noted: 2018-05-24

## 2018-05-24 PROBLEM — J96.01 ACUTE RESPIRATORY FAILURE WITH HYPOXIA (HCC): Status: ACTIVE | Noted: 2018-05-24

## 2018-05-24 LAB
ANION GAP SERPL CALC-SCNC: 4 MMOL/L (ref 0–11.9)
BASOPHILS # BLD AUTO: 0.6 % (ref 0–1.8)
BASOPHILS # BLD: 0.12 K/UL (ref 0–0.12)
BUN SERPL-MCNC: 18 MG/DL (ref 8–22)
CALCIUM SERPL-MCNC: 8.1 MG/DL (ref 8.5–10.5)
CHLORIDE SERPL-SCNC: 108 MMOL/L (ref 96–112)
CO2 SERPL-SCNC: 23 MMOL/L (ref 20–33)
CREAT SERPL-MCNC: 0.73 MG/DL (ref 0.5–1.4)
EOSINOPHIL # BLD AUTO: 0.12 K/UL (ref 0–0.51)
EOSINOPHIL NFR BLD: 0.6 % (ref 0–6.9)
ERYTHROCYTE [DISTWIDTH] IN BLOOD BY AUTOMATED COUNT: 47.8 FL (ref 35.9–50)
GLUCOSE SERPL-MCNC: 112 MG/DL (ref 65–99)
HCT VFR BLD AUTO: 33 % (ref 42–52)
HGB BLD-MCNC: 10.9 G/DL (ref 14–18)
IMM GRANULOCYTES # BLD AUTO: 0.08 K/UL (ref 0–0.11)
IMM GRANULOCYTES NFR BLD AUTO: 0.4 % (ref 0–0.9)
LYMPHOCYTES # BLD AUTO: 2.36 K/UL (ref 1–4.8)
LYMPHOCYTES NFR BLD: 11.7 % (ref 22–41)
MCH RBC QN AUTO: 32 PG (ref 27–33)
MCHC RBC AUTO-ENTMCNC: 33 G/DL (ref 33.7–35.3)
MCV RBC AUTO: 96.8 FL (ref 81.4–97.8)
MONOCYTES # BLD AUTO: 1.93 K/UL (ref 0–0.85)
MONOCYTES NFR BLD AUTO: 9.6 % (ref 0–13.4)
NEUTROPHILS # BLD AUTO: 15.48 K/UL (ref 1.82–7.42)
NEUTROPHILS NFR BLD: 77.1 % (ref 44–72)
NRBC # BLD AUTO: 0 K/UL
NRBC BLD-RTO: 0 /100 WBC
PLATELET # BLD AUTO: 163 K/UL (ref 164–446)
PMV BLD AUTO: 11.1 FL (ref 9–12.9)
POTASSIUM SERPL-SCNC: 4.4 MMOL/L (ref 3.6–5.5)
RBC # BLD AUTO: 3.41 M/UL (ref 4.7–6.1)
SODIUM SERPL-SCNC: 135 MMOL/L (ref 135–145)
WBC # BLD AUTO: 20.1 K/UL (ref 4.8–10.8)

## 2018-05-24 PROCEDURE — 99233 SBSQ HOSP IP/OBS HIGH 50: CPT | Performed by: INTERNAL MEDICINE

## 2018-05-24 PROCEDURE — 700102 HCHG RX REV CODE 250 W/ 637 OVERRIDE(OP): Performed by: HOSPITALIST

## 2018-05-24 PROCEDURE — 80048 BASIC METABOLIC PNL TOTAL CA: CPT

## 2018-05-24 PROCEDURE — 700105 HCHG RX REV CODE 258: Performed by: HOSPITALIST

## 2018-05-24 PROCEDURE — 36415 COLL VENOUS BLD VENIPUNCTURE: CPT

## 2018-05-24 PROCEDURE — 700111 HCHG RX REV CODE 636 W/ 250 OVERRIDE (IP): Performed by: HOSPITALIST

## 2018-05-24 PROCEDURE — 85025 COMPLETE CBC W/AUTO DIFF WBC: CPT

## 2018-05-24 PROCEDURE — A9270 NON-COVERED ITEM OR SERVICE: HCPCS | Performed by: INTERNAL MEDICINE

## 2018-05-24 PROCEDURE — A9270 NON-COVERED ITEM OR SERVICE: HCPCS | Performed by: HOSPITALIST

## 2018-05-24 PROCEDURE — 700102 HCHG RX REV CODE 250 W/ 637 OVERRIDE(OP): Performed by: INTERNAL MEDICINE

## 2018-05-24 PROCEDURE — 770006 HCHG ROOM/CARE - MED/SURG/GYN SEMI*

## 2018-05-24 RX ORDER — GABAPENTIN 300 MG/1
300 CAPSULE ORAL 3 TIMES DAILY
Status: DISCONTINUED | OUTPATIENT
Start: 2018-05-24 | End: 2018-05-27 | Stop reason: HOSPADM

## 2018-05-24 RX ORDER — AZITHROMYCIN 250 MG/1
250 TABLET, FILM COATED ORAL DAILY
Status: DISCONTINUED | OUTPATIENT
Start: 2018-05-25 | End: 2018-05-26

## 2018-05-24 RX ADMIN — OXYCODONE HYDROCHLORIDE AND ACETAMINOPHEN 2 TABLET: 10; 325 TABLET ORAL at 18:01

## 2018-05-24 RX ADMIN — OXYCODONE HYDROCHLORIDE AND ACETAMINOPHEN 2 TABLET: 10; 325 TABLET ORAL at 12:03

## 2018-05-24 RX ADMIN — CLONIDINE HYDROCHLORIDE 0.1 MG: 0.1 TABLET ORAL at 08:32

## 2018-05-24 RX ADMIN — MORPHINE SULFATE 15 MG: 15 TABLET, EXTENDED RELEASE ORAL at 15:03

## 2018-05-24 RX ADMIN — CEFTRIAXONE 2 G: 2 INJECTION, POWDER, FOR SOLUTION INTRAMUSCULAR; INTRAVENOUS at 08:31

## 2018-05-24 RX ADMIN — MORPHINE SULFATE 15 MG: 15 TABLET, EXTENDED RELEASE ORAL at 19:50

## 2018-05-24 RX ADMIN — CLONIDINE HYDROCHLORIDE 0.1 MG: 0.1 TABLET ORAL at 19:50

## 2018-05-24 RX ADMIN — CAPSAICIN: 0.25 CREAM TOPICAL at 08:32

## 2018-05-24 RX ADMIN — STANDARDIZED SENNA CONCENTRATE AND DOCUSATE SODIUM 2 TABLET: 8.6; 5 TABLET, FILM COATED ORAL at 08:32

## 2018-05-24 RX ADMIN — LIDOCAINE HYDROCHLORIDE 1 APPLICATION: 40 SOLUTION TOPICAL at 08:33

## 2018-05-24 RX ADMIN — LIDOCAINE HYDROCHLORIDE 1 APPLICATION: 40 SOLUTION TOPICAL at 19:51

## 2018-05-24 RX ADMIN — OXYCODONE HYDROCHLORIDE AND ACETAMINOPHEN 2 TABLET: 10; 325 TABLET ORAL at 05:58

## 2018-05-24 RX ADMIN — GABAPENTIN 300 MG: 300 CAPSULE ORAL at 15:03

## 2018-05-24 RX ADMIN — AZITHROMYCIN FOR INJECTION INJECTION, POWDER, LYOPHILIZED, FOR SOLUTION 500 MG: 500 INJECTION INTRAVENOUS at 08:31

## 2018-05-24 RX ADMIN — STANDARDIZED SENNA CONCENTRATE AND DOCUSATE SODIUM 2 TABLET: 8.6; 5 TABLET, FILM COATED ORAL at 19:50

## 2018-05-24 RX ADMIN — MORPHINE SULFATE 15 MG: 15 TABLET, EXTENDED RELEASE ORAL at 08:33

## 2018-05-24 RX ADMIN — ENOXAPARIN SODIUM 40 MG: 100 INJECTION SUBCUTANEOUS at 08:32

## 2018-05-24 RX ADMIN — MAGNESIUM HYDROXIDE 30 ML: 400 SUSPENSION ORAL at 19:54

## 2018-05-24 RX ADMIN — SODIUM CHLORIDE: 9 INJECTION, SOLUTION INTRAVENOUS at 08:32

## 2018-05-24 ASSESSMENT — ENCOUNTER SYMPTOMS
POLYDIPSIA: 0
COUGH: 1
SHORTNESS OF BREATH: 1
DOUBLE VISION: 0
DEPRESSION: 0
BLURRED VISION: 0
HEADACHES: 0
SPUTUM PRODUCTION: 1
NAUSEA: 0
BRUISES/BLEEDS EASILY: 0
VOMITING: 0
WHEEZING: 0
CHILLS: 0
NECK PAIN: 0
HEMOPTYSIS: 0
ORTHOPNEA: 0
DIZZINESS: 0
PALPITATIONS: 0
MYALGIAS: 0
HEARTBURN: 0
FEVER: 0

## 2018-05-24 ASSESSMENT — PAIN SCALES - GENERAL
PAINLEVEL_OUTOF10: 5
PAINLEVEL_OUTOF10: 4
PAINLEVEL_OUTOF10: 6
PAINLEVEL_OUTOF10: 2
PAINLEVEL_OUTOF10: 4

## 2018-05-24 NOTE — CARE PLAN
Problem: Infection  Goal: Will remain free from infection  Outcome: PROGRESSING AS EXPECTED  Monitor labs and vitals. IV abx per MAR.

## 2018-05-24 NOTE — ASSESSMENT & PLAN NOTE
This is sepsis (without associated acute organ dysfunction).   CXR: RUL consolidation  Antibiotics  IV fluids monitor serial lactic acid  Strict I's and O's and monitor labs    Appears to be resolved

## 2018-05-24 NOTE — PROGRESS NOTES
[facility] Hospitalist Progress Note    Date of Service: 2018    Chief Complaint  73 y.o. male admitted 2018 with community-acquired pneumonia    Interval Problem Update  Patient reports improvement subjectively. White blood cells went down to 20k. Hemoglobin down to 10.9. Platelets down to 163.  Left chest pain from zoster is  Somewhat controlled.  Patient is to home 3-4 L of oxygen. We'll wean off as tolerated.      Consultants/Specialty  none    Disposition  Probably home. Will order PT evaluation        Review of Systems   Constitutional: Negative for chills and fever.   HENT: Negative for hearing loss and tinnitus.    Eyes: Negative for blurred vision and double vision.   Respiratory: Positive for cough, sputum production and shortness of breath. Negative for hemoptysis and wheezing.    Cardiovascular: Negative for chest pain, palpitations and orthopnea.   Gastrointestinal: Negative for heartburn, nausea and vomiting.   Genitourinary: Negative for dysuria and urgency.   Musculoskeletal: Negative for myalgias and neck pain.   Skin: Negative for itching and rash.   Neurological: Negative for dizziness and headaches.   Endo/Heme/Allergies: Negative for environmental allergies and polydipsia. Does not bruise/bleed easily.   Psychiatric/Behavioral: Negative for depression and suicidal ideas.      Physical Exam  Laboratory/Imaging   Hemodynamics  Temp (24hrs), Av.6 °C (97.9 °F), Min:36.4 °C (97.6 °F), Max:36.9 °C (98.5 °F)   Temperature: 36.6 °C (97.9 °F)  Pulse  Av.8  Min: 68  Max: 113 Heart Rate (Monitored): 70  Blood Pressure : 105/59, NIBP: 104/49      Respiratory      Respiration: 20, Pulse Oximetry: 94 %        RUL Breath Sounds: Diminished, RML Breath Sounds: Diminished, RLL Breath Sounds: Diminished, KAREN Breath Sounds: Diminished, LLL Breath Sounds: Diminished    Fluids    Intake/Output Summary (Last 24 hours) at 18 1404  Last data filed at 18 1212   Gross per 24 hour   Intake              2705 ml   Output              700 ml   Net             2005 ml       Nutrition  Orders Placed This Encounter   Procedures   • Diet Order     Standing Status:   Standing     Number of Occurrences:   1     Order Specific Question:   Diet:     Answer:   Regular [1]     Physical Exam  Constitutional: He is oriented to person, place, and time. He appears well-developed and well-nourished. No distress.   HENT:   Head: Normocephalic and atraumatic.   Nose: Nose normal.   Mouth/Throat: Oropharynx is clear and moist.   Eyes: Conjunctivae and EOM are normal. Right eye exhibits no discharge. Left eye exhibits no discharge. No scleral icterus.   Neck: Normal range of motion. No tracheal deviation present. No thyromegaly present.   Cardiovascular: Normal rate, regular rhythm, normal heart sounds and intact distal pulses.    No murmur heard.  Pulses:       Radial pulses are 2+ on the right side, and 2+ on the left side.        Dorsalis pedis pulses are 2+ on the right side, and 2+ on the left side.   Pulmonary/Chest: Effort normal. No stridor. No respiratory distress. He has decreased breath sounds. He has no wheezes. He has rhonchi (bibasilar).   Abdominal: Soft. Bowel sounds are normal. He exhibits no distension. There is no tenderness.   Musculoskeletal: He exhibits no edema.   Lymphadenopathy:     He has no cervical adenopathy.   Neurological: He is alert and oriented to person, place, and time. No cranial nerve deficit.   Skin: Skin is warm. He is not diaphoretic.   Residual not active herpes zoster on left lateral chest   Psychiatric: He has a normal mood and affect. His behavior is normal. Thought content normal.   Vitals reviewed.          Recent Labs      05/23/18   1122  05/24/18   0242   WBC  29.6*  20.1*   RBC  4.55*  3.41*   HEMOGLOBIN  14.6  10.9*   HEMATOCRIT  42.1  33.0*   MCV  92.5  96.8   MCH  32.1  32.0   MCHC  34.7  33.0*   RDW  43.0  47.8   PLATELETCT  242  163*   MPV  11.2  11.1     Recent Labs       05/23/18   1122  05/24/18   0146   SODIUM  137  135   POTASSIUM  4.1  4.4   CHLORIDE  104  108   CO2  24  23   GLUCOSE  123*  112*   BUN  17  18   CREATININE  0.85  0.73   CALCIUM  9.5  8.1*         Recent Labs      05/23/18   1122   BNPBTYPENAT  58              Assessment/Plan     Acute respiratory failure with hypoxia (HCC)   Assessment & Plan    Secondary to pneumonia. Low probability of PE  Renal function as tolerated.  Continue treatment of pneumonia with antibiotics        Pneumonia   Assessment & Plan    Procalcitonin: 4  Rocephin and azithromycin  RT protocol and supplemental oxygen  Monitor vitals.  Continue antibiotics, breathing treatment. Wean off oxygen as tolerated        Post herpetic neuralgia   Assessment & Plan    Gabapentin  Capsaicin cream and lidocaine cream        Sepsis (HCC)   Assessment & Plan    This is sepsis (without associated acute organ dysfunction).   CXR: RUL consolidation  Antibiotics  IV fluids monitor serial lactic acid  Strict I's and O's and monitor labs    Appears to be resolved        Thrombocytopenia (HCC)   Assessment & Plan    Possibilities that this is from DIC. We'll check coagulation studies.  Monitor          Quality-Core Measures

## 2018-05-24 NOTE — ASSESSMENT & PLAN NOTE
Procalcitonin: 4  Was started on Rocephin and azithromycin; changed to Levaquin due to spike of fever  RT protocol and supplemental oxygen  Monitor vitals.  Continue antibiotics, breathing treatment. Wean off oxygen as tolerated

## 2018-05-24 NOTE — CARE PLAN
Problem: Safety  Goal: Will remain free from injury  Outcome: PROGRESSING AS EXPECTED  Safety maintained. Pt ambulatory. Gait steady, balance intact. Educated pt on fall prevention. Pt verbalized understanding.

## 2018-05-24 NOTE — ASSESSMENT & PLAN NOTE
Secondary to pneumonia. Low probability of PE  Renal function as tolerated.  Continue treatment of pneumonia with antibiotics

## 2018-05-24 NOTE — PROGRESS NOTES
Two RN skin assessment done w/ Angelika. Pt's skin mostly unremarkable, all bony prominences pink and blanching, old shingles scars present to left upper back/chest, other than that no existing skin issues.

## 2018-05-24 NOTE — PROGRESS NOTES
Pt alert and oriented x4. Pt up ad janene. Pt resting comfortably in bed. Denies pain at this time. VSS. Safety maintained. Belongings within reach. Bed in lowest position and call light within reach. Pt updated on plan of care and pt verbalized understanding. Will continue to monitor.

## 2018-05-24 NOTE — PROGRESS NOTES
Patient is AOx4. Plan of care discussed. Complains of pain, medicated with scheduled morphine see MAR.  Educated on pain control options, PO medications vs topicals. Verbalized understanding.Tolerated all oral medications. On  4 L of O2 per NC. Call light in use,  treaded socks on, bed locked in low position

## 2018-05-24 NOTE — ASSESSMENT & PLAN NOTE
Possibilities that this is from DIC. Improving. Associated with coagulopathy, probably secondary to sepsis.  Monitor

## 2018-05-24 NOTE — H&P
Hospital Medicine History and Physical    Date of Service  5/23/2018    Chief Complaint  Chief Complaint   Patient presents with   • Chest Pain   • Cough   • Shortness of Breath       History of Presenting Illness  73 y.o. male who presented 5/23/2018 with recent left lateral chest shingles outbreak 2 months ago with resulting neuropathic pain.  He came in today with concerns of some shortness of breath over the last week.  He has felt weak as well as some chills and fevers over the last few days.  His wife made him come to the hospital he was found to be hypoxic in the 80s.  Workup in the emergency room was concerning for pneumonia.  He does have grandchildren that he interacts with but no known recent illnesses.  Otherwise no known sick contacts.  He denies any runny nose sore throat, he has been coughing up some sputum.  He denies any dysuria or diarrhea nor any open wounds or sores.      Primary Care Physician  BONNIE Elam.    Consultants  None    Code Status  Full    Review of Systems  Review of Systems   Constitutional: Positive for chills, fever and malaise/fatigue.   HENT: Negative for congestion and sore throat.    Eyes: Negative for double vision.   Respiratory: Positive for cough, sputum production and shortness of breath. Negative for stridor.    Cardiovascular: Positive for chest pain (post herpetic neuralgia). Negative for palpitations and leg swelling.   Gastrointestinal: Negative for abdominal pain, diarrhea, nausea and vomiting.   Genitourinary: Negative for dysuria.   Musculoskeletal: Positive for myalgias (left lateral chest from postherpetic neuralgia). Negative for joint pain.   Skin: Negative for rash.   Neurological: Negative for dizziness and headaches.   Psychiatric/Behavioral: Negative for substance abuse. The patient is not nervous/anxious.         Past Medical History  Past Medical History:   Diagnosis Date   • Shingles 05/01/2018   • Shingles 03/01/2018   • Asthma         Surgical History  No past surgical history on file.    Medications  No current facility-administered medications on file prior to encounter.      Current Outpatient Prescriptions on File Prior to Encounter   Medication Sig Dispense Refill   • clonidine (CATAPRES) 0.1 MG Tab Take 0.1 mg by mouth 2 Times a Day.  2   • diazepam (VALIUM) 10 MG tablet Take 1 Tab by mouth every 6 hours as needed for Anxiety. 30 Tab 0   • morphine ER (MS CONTIN) 15 MG Tab CR tablet Take 1 Tab by mouth 3 times a day.     • oxycodone-acetaminophen (PERCOCET-10)  MG Tab Take 1-2 Tabs by mouth every 6 hours as needed for Severe Pain.         Family History  Family History   Problem Relation Age of Onset   • Cancer Father      Esophogial/smoker   • No Known Problems Mother    • No Known Problems Brother    • No Known Problems Son    • No Known Problems Daughter    • No Known Problems Daughter    • No Known Problems Maternal Grandmother    • No Known Problems Maternal Grandfather    • No Known Problems Paternal Grandmother    • No Known Problems Paternal Grandfather        Social History  Social History   Substance Use Topics   • Smoking status: Former Smoker     Packs/day: 1.00     Years: 39.00     Types: Cigarettes     Quit date: 2000   • Smokeless tobacco: Never Used      Comment: started smoking at age 16   • Alcohol use No       Allergies  No Known Allergies     Physical Exam  Laboratory   Hemodynamics  Temp (24hrs), Av.8 °C (98.2 °F), Min:36.4 °C (97.6 °F), Max:36.9 °C (98.5 °F)   Temperature: 36.4 °C (97.6 °F)  Pulse  Av  Min: 70  Max: 113 Heart Rate (Monitored): 70  Blood Pressure : (!) 90/42, NIBP: 104/49      Respiratory      Respiration: 18, Pulse Oximetry: 94 %        RUL Breath Sounds: Diminished, RML Breath Sounds: Diminished, RLL Breath Sounds: Diminished, KAREN Breath Sounds: Diminished, LLL Breath Sounds: Diminished    Physical Exam   Constitutional: He is oriented to person, place, and time. He appears  well-developed and well-nourished. No distress.   HENT:   Head: Normocephalic and atraumatic.   Nose: Nose normal.   Mouth/Throat: Oropharynx is clear and moist.   Eyes: Conjunctivae and EOM are normal. Right eye exhibits no discharge. Left eye exhibits no discharge. No scleral icterus.   Neck: Normal range of motion. No tracheal deviation present. No thyromegaly present.   Cardiovascular: Normal rate, regular rhythm, normal heart sounds and intact distal pulses.    No murmur heard.  Pulses:       Radial pulses are 2+ on the right side, and 2+ on the left side.        Dorsalis pedis pulses are 2+ on the right side, and 2+ on the left side.   Pulmonary/Chest: Effort normal. No stridor. No respiratory distress. He has decreased breath sounds. He has no wheezes. He has rhonchi (bibasilar).   Abdominal: Soft. Bowel sounds are normal. He exhibits no distension. There is no tenderness.   Musculoskeletal: He exhibits no edema.   Lymphadenopathy:     He has no cervical adenopathy.   Neurological: He is alert and oriented to person, place, and time. No cranial nerve deficit.   Skin: Skin is warm. He is not diaphoretic.   Healed herpes zoster rash on left lateral chest w/o active lesions.   Psychiatric: He has a normal mood and affect. His behavior is normal. Thought content normal.   Vitals reviewed.      Recent Labs      05/23/18   1122   WBC  29.6*   RBC  4.55*   HEMOGLOBIN  14.6   HEMATOCRIT  42.1   MCV  92.5   MCH  32.1   MCHC  34.7   RDW  43.0   PLATELETCT  242   MPV  11.2     Recent Labs      05/23/18   1122   SODIUM  137   POTASSIUM  4.1   CHLORIDE  104   CO2  24   GLUCOSE  123*   BUN  17   CREATININE  0.85   CALCIUM  9.5     Recent Labs      05/23/18   1122   ALTSGPT  16   ASTSGOT  16   ALKPHOSPHAT  71   TBILIRUBIN  0.5   GLUCOSE  123*         Recent Labs      05/23/18   1122   BNPBTYPENAT  58         Lab Results   Component Value Date    TROPONINI 0.01 05/23/2018     Urinalysis:    Lab Results  Component Value  Date/Time   SPECGRAVITY 1.020 05/23/2018 1222   GLUCOSEUR Negative 05/23/2018 1222   KETONES Trace (A) 05/23/2018 1222   NITRITE Negative 05/23/2018 1222   WBCURINE 0-2 (A) 05/23/2018 1222   RBCURINE 2-5 (A) 05/23/2018 1222   BACTERIA Negative 05/23/2018 1222   EPITHELCELL Negative 05/23/2018 1222        Imaging  CXR: Right upper lobe consolidation suggestive of pneumonia. Recommend follow-up chest x-ray in one to 2 weeks after appropriate treatment to ensure resolution.   Assessment/Plan     I anticipate this patient will require at least two midnights for appropriate medical management, necessitating inpatient admission.    Post herpetic neuralgia   Assessment & Plan    Gabapentin  Capsaicin cream and lidocaine cream        Sepsis (HCC)   Assessment & Plan    This is sepsis (without associated acute organ dysfunction).   CXR: RUL consolidation  Antibiotics  IV fluids monitor serial lactic acid  Strict I's and O's and monitor labs        Pneumonia   Assessment & Plan    Procalcitonin: 4  Rocephin and azithromycin  RT protocol and supplemental oxygen  Monitor vitals.            VTE prophylaxis: lovenox

## 2018-05-24 NOTE — CARE PLAN
Problem: Respiratory:  Goal: Respiratory status will improve    Intervention: Administer and titrate oxygen therapy  Pt needs oxygen at rest and ambulation at the moment, educated about always wearing nasal cannula even when getting up to use BR.      Problem: Pain Management  Goal: Pain level will decrease to patient's comfort goal  Outcome: PROGRESSING AS EXPECTED  Monitoring pt's nerve pain w/ PO Norco and scheduled Morphine along w/ PRN lidocaine, pt reports an improvement/decrease in pain

## 2018-05-25 PROBLEM — R79.89 ELEVATED LFTS: Status: ACTIVE | Noted: 2018-05-25

## 2018-05-25 LAB
ALBUMIN SERPL BCP-MCNC: 3.1 G/DL (ref 3.2–4.9)
ALBUMIN SERPL BCP-MCNC: 3.4 G/DL (ref 3.2–4.9)
ALBUMIN/GLOB SERPL: 1.2 G/DL
ALBUMIN/GLOB SERPL: 1.2 G/DL
ALP SERPL-CCNC: 133 U/L (ref 30–99)
ALP SERPL-CCNC: 86 U/L (ref 30–99)
ALT SERPL-CCNC: 134 U/L (ref 2–50)
ALT SERPL-CCNC: 83 U/L (ref 2–50)
ANION GAP SERPL CALC-SCNC: 3 MMOL/L (ref 0–11.9)
ANION GAP SERPL CALC-SCNC: 7 MMOL/L (ref 0–11.9)
AST SERPL-CCNC: 58 U/L (ref 12–45)
AST SERPL-CCNC: 73 U/L (ref 12–45)
BACTERIA UR CULT: NORMAL
BASOPHILS # BLD AUTO: 0.5 % (ref 0–1.8)
BASOPHILS # BLD AUTO: 0.7 % (ref 0–1.8)
BASOPHILS # BLD: 0.06 K/UL (ref 0–0.12)
BASOPHILS # BLD: 0.09 K/UL (ref 0–0.12)
BILIRUB SERPL-MCNC: 0.4 MG/DL (ref 0.1–1.5)
BILIRUB SERPL-MCNC: 0.4 MG/DL (ref 0.1–1.5)
BUN SERPL-MCNC: 10 MG/DL (ref 8–22)
BUN SERPL-MCNC: 10 MG/DL (ref 8–22)
CALCIUM SERPL-MCNC: 8.6 MG/DL (ref 8.5–10.5)
CALCIUM SERPL-MCNC: 9.1 MG/DL (ref 8.5–10.5)
CHLORIDE SERPL-SCNC: 103 MMOL/L (ref 96–112)
CHLORIDE SERPL-SCNC: 109 MMOL/L (ref 96–112)
CO2 SERPL-SCNC: 23 MMOL/L (ref 20–33)
CO2 SERPL-SCNC: 26 MMOL/L (ref 20–33)
CREAT SERPL-MCNC: 0.69 MG/DL (ref 0.5–1.4)
CREAT SERPL-MCNC: 0.92 MG/DL (ref 0.5–1.4)
EOSINOPHIL # BLD AUTO: 0.12 K/UL (ref 0–0.51)
EOSINOPHIL # BLD AUTO: 0.23 K/UL (ref 0–0.51)
EOSINOPHIL NFR BLD: 1 % (ref 0–6.9)
EOSINOPHIL NFR BLD: 1.8 % (ref 0–6.9)
ERYTHROCYTE [DISTWIDTH] IN BLOOD BY AUTOMATED COUNT: 44.5 FL (ref 35.9–50)
ERYTHROCYTE [DISTWIDTH] IN BLOOD BY AUTOMATED COUNT: 46 FL (ref 35.9–50)
GLOBULIN SER CALC-MCNC: 2.6 G/DL (ref 1.9–3.5)
GLOBULIN SER CALC-MCNC: 2.9 G/DL (ref 1.9–3.5)
GLUCOSE SERPL-MCNC: 106 MG/DL (ref 65–99)
GLUCOSE SERPL-MCNC: 117 MG/DL (ref 65–99)
HCT VFR BLD AUTO: 33.3 % (ref 42–52)
HCT VFR BLD AUTO: 33.5 % (ref 42–52)
HGB BLD-MCNC: 10.8 G/DL (ref 14–18)
HGB BLD-MCNC: 11.4 G/DL (ref 14–18)
IMM GRANULOCYTES # BLD AUTO: 0.04 K/UL (ref 0–0.11)
IMM GRANULOCYTES # BLD AUTO: 0.05 K/UL (ref 0–0.11)
IMM GRANULOCYTES NFR BLD AUTO: 0.3 % (ref 0–0.9)
IMM GRANULOCYTES NFR BLD AUTO: 0.4 % (ref 0–0.9)
INR PPP: 1.38 (ref 0.87–1.13)
LYMPHOCYTES # BLD AUTO: 1.4 K/UL (ref 1–4.8)
LYMPHOCYTES # BLD AUTO: 1.89 K/UL (ref 1–4.8)
LYMPHOCYTES NFR BLD: 11.1 % (ref 22–41)
LYMPHOCYTES NFR BLD: 15.1 % (ref 22–41)
MCH RBC QN AUTO: 31.2 PG (ref 27–33)
MCH RBC QN AUTO: 31.9 PG (ref 27–33)
MCHC RBC AUTO-ENTMCNC: 32.2 G/DL (ref 33.7–35.3)
MCHC RBC AUTO-ENTMCNC: 34.2 G/DL (ref 33.7–35.3)
MCV RBC AUTO: 93.3 FL (ref 81.4–97.8)
MCV RBC AUTO: 96.8 FL (ref 81.4–97.8)
MONOCYTES # BLD AUTO: 1.19 K/UL (ref 0–0.85)
MONOCYTES # BLD AUTO: 1.65 K/UL (ref 0–0.85)
MONOCYTES NFR BLD AUTO: 13.2 % (ref 0–13.4)
MONOCYTES NFR BLD AUTO: 9.5 % (ref 0–13.4)
NEUTROPHILS # BLD AUTO: 8.61 K/UL (ref 1.82–7.42)
NEUTROPHILS # BLD AUTO: 9.77 K/UL (ref 1.82–7.42)
NEUTROPHILS NFR BLD: 68.9 % (ref 44–72)
NEUTROPHILS NFR BLD: 77.5 % (ref 44–72)
NRBC # BLD AUTO: 0 K/UL
NRBC # BLD AUTO: 0 K/UL
NRBC BLD-RTO: 0 /100 WBC
NRBC BLD-RTO: 0 /100 WBC
PLATELET # BLD AUTO: 172 K/UL (ref 164–446)
PLATELET # BLD AUTO: 195 K/UL (ref 164–446)
PMV BLD AUTO: 11.3 FL (ref 9–12.9)
PMV BLD AUTO: 11.7 FL (ref 9–12.9)
POTASSIUM SERPL-SCNC: 3.9 MMOL/L (ref 3.6–5.5)
POTASSIUM SERPL-SCNC: 3.9 MMOL/L (ref 3.6–5.5)
PROCALCITONIN SERPL-MCNC: 2.26 NG/ML
PROT SERPL-MCNC: 5.7 G/DL (ref 6–8.2)
PROT SERPL-MCNC: 6.3 G/DL (ref 6–8.2)
PROTHROMBIN TIME: 16.7 SEC (ref 12–14.6)
RBC # BLD AUTO: 3.46 M/UL (ref 4.7–6.1)
RBC # BLD AUTO: 3.57 M/UL (ref 4.7–6.1)
SIGNIFICANT IND 70042: NORMAL
SITE SITE: NORMAL
SODIUM SERPL-SCNC: 133 MMOL/L (ref 135–145)
SODIUM SERPL-SCNC: 138 MMOL/L (ref 135–145)
SOURCE SOURCE: NORMAL
WBC # BLD AUTO: 12.5 K/UL (ref 4.8–10.8)
WBC # BLD AUTO: 12.6 K/UL (ref 4.8–10.8)

## 2018-05-25 PROCEDURE — 85025 COMPLETE CBC W/AUTO DIFF WBC: CPT

## 2018-05-25 PROCEDURE — A9270 NON-COVERED ITEM OR SERVICE: HCPCS | Performed by: HOSPITALIST

## 2018-05-25 PROCEDURE — 700105 HCHG RX REV CODE 258: Performed by: HOSPITALIST

## 2018-05-25 PROCEDURE — 85610 PROTHROMBIN TIME: CPT

## 2018-05-25 PROCEDURE — 99233 SBSQ HOSP IP/OBS HIGH 50: CPT | Performed by: INTERNAL MEDICINE

## 2018-05-25 PROCEDURE — 700111 HCHG RX REV CODE 636 W/ 250 OVERRIDE (IP): Performed by: HOSPITALIST

## 2018-05-25 PROCEDURE — 36415 COLL VENOUS BLD VENIPUNCTURE: CPT

## 2018-05-25 PROCEDURE — 84145 PROCALCITONIN (PCT): CPT

## 2018-05-25 PROCEDURE — 700102 HCHG RX REV CODE 250 W/ 637 OVERRIDE(OP): Performed by: HOSPITALIST

## 2018-05-25 PROCEDURE — 80053 COMPREHEN METABOLIC PANEL: CPT

## 2018-05-25 PROCEDURE — 770006 HCHG ROOM/CARE - MED/SURG/GYN SEMI*

## 2018-05-25 PROCEDURE — 700102 HCHG RX REV CODE 250 W/ 637 OVERRIDE(OP): Performed by: INTERNAL MEDICINE

## 2018-05-25 PROCEDURE — A9270 NON-COVERED ITEM OR SERVICE: HCPCS | Performed by: INTERNAL MEDICINE

## 2018-05-25 RX ADMIN — ACETAMINOPHEN 650 MG: 325 TABLET, FILM COATED ORAL at 19:17

## 2018-05-25 RX ADMIN — OXYCODONE HYDROCHLORIDE AND ACETAMINOPHEN 2 TABLET: 10; 325 TABLET ORAL at 23:05

## 2018-05-25 RX ADMIN — CLONIDINE HYDROCHLORIDE 0.1 MG: 0.1 TABLET ORAL at 09:34

## 2018-05-25 RX ADMIN — LIDOCAINE HYDROCHLORIDE 1 APPLICATION: 40 SOLUTION TOPICAL at 23:05

## 2018-05-25 RX ADMIN — CEFTRIAXONE 2 G: 2 INJECTION, POWDER, FOR SOLUTION INTRAMUSCULAR; INTRAVENOUS at 09:43

## 2018-05-25 RX ADMIN — MORPHINE SULFATE 15 MG: 15 TABLET, EXTENDED RELEASE ORAL at 09:36

## 2018-05-25 RX ADMIN — OXYCODONE HYDROCHLORIDE AND ACETAMINOPHEN 2 TABLET: 10; 325 TABLET ORAL at 06:18

## 2018-05-25 RX ADMIN — GABAPENTIN 300 MG: 300 CAPSULE ORAL at 14:51

## 2018-05-25 RX ADMIN — MORPHINE SULFATE 15 MG: 15 TABLET, EXTENDED RELEASE ORAL at 14:51

## 2018-05-25 RX ADMIN — GABAPENTIN 300 MG: 300 CAPSULE ORAL at 00:03

## 2018-05-25 RX ADMIN — OXYCODONE HYDROCHLORIDE AND ACETAMINOPHEN 1 TABLET: 10; 325 TABLET ORAL at 12:27

## 2018-05-25 RX ADMIN — CLONIDINE HYDROCHLORIDE 0.1 MG: 0.1 TABLET ORAL at 19:30

## 2018-05-25 RX ADMIN — MORPHINE SULFATE 15 MG: 15 TABLET, EXTENDED RELEASE ORAL at 19:30

## 2018-05-25 RX ADMIN — STANDARDIZED SENNA CONCENTRATE AND DOCUSATE SODIUM 2 TABLET: 8.6; 5 TABLET, FILM COATED ORAL at 09:33

## 2018-05-25 RX ADMIN — OXYCODONE HYDROCHLORIDE AND ACETAMINOPHEN 2 TABLET: 10; 325 TABLET ORAL at 00:03

## 2018-05-25 RX ADMIN — SODIUM CHLORIDE: 9 INJECTION, SOLUTION INTRAVENOUS at 01:47

## 2018-05-25 RX ADMIN — GABAPENTIN 300 MG: 300 CAPSULE ORAL at 06:18

## 2018-05-25 RX ADMIN — LIDOCAINE HYDROCHLORIDE 1 APPLICATION: 40 SOLUTION TOPICAL at 12:41

## 2018-05-25 RX ADMIN — STANDARDIZED SENNA CONCENTRATE AND DOCUSATE SODIUM 2 TABLET: 8.6; 5 TABLET, FILM COATED ORAL at 19:30

## 2018-05-25 RX ADMIN — AZITHROMYCIN 250 MG: 250 TABLET, FILM COATED ORAL at 09:33

## 2018-05-25 RX ADMIN — ENOXAPARIN SODIUM 40 MG: 100 INJECTION SUBCUTANEOUS at 09:34

## 2018-05-25 RX ADMIN — GABAPENTIN 300 MG: 300 CAPSULE ORAL at 23:05

## 2018-05-25 ASSESSMENT — ENCOUNTER SYMPTOMS
PALPITATIONS: 0
HEMOPTYSIS: 0
NECK PAIN: 0
WHEEZING: 0
BRUISES/BLEEDS EASILY: 0
SHORTNESS OF BREATH: 1
VOMITING: 0
CHILLS: 0
POLYDIPSIA: 0
DEPRESSION: 0
FEVER: 0
BLURRED VISION: 0
NAUSEA: 0
MYALGIAS: 0
SPUTUM PRODUCTION: 1
HEARTBURN: 0
DIZZINESS: 0
COUGH: 1
ORTHOPNEA: 0
DOUBLE VISION: 0
HEADACHES: 0

## 2018-05-25 ASSESSMENT — PAIN SCALES - GENERAL
PAINLEVEL_OUTOF10: 5
PAINLEVEL_OUTOF10: 4
PAINLEVEL_OUTOF10: 4
PAINLEVEL_OUTOF10: 7
PAINLEVEL_OUTOF10: 7
PAINLEVEL_OUTOF10: 3
PAINLEVEL_OUTOF10: 6
PAINLEVEL_OUTOF10: 9
PAINLEVEL_OUTOF10: 0
PAINLEVEL_OUTOF10: 7

## 2018-05-25 NOTE — PROGRESS NOTES
"1420: Pt attempting to wean off oxygen. Encouraging the use of IS. Encouraging ambulation as tolerated. Pt ambulated without oxygen with a pulse ox on. He stayed at 88 oxygenation but did drop to 87 on a few occassions but was able to return 88-90 with simple deep breathing. Have encouraged he rest for some time. Currently on 1.5 L and sating at 94%.     1630:  Completed home O2 evaluation. Pt sats at 94% on RA but with ambulation will de-sat to 87%. Pt expresses his want to \"not go home on oxygen\". I have reinforced that he can continue to work on his IS, continue to mobilize and drink fluids to thin out his mucous. Encouraging deep breathing and coughing. Pt states \"hopefully by tomorrow I can get off of oxygen\". Pt currently sitting in bed on RA. States he will put it on if he sees his  drop to 87. Have encouraged pt to remain on the 1.5 L for now but pt states he will call if he feels he needs it. Rohit light at reach and hourly rounding in place.   "

## 2018-05-25 NOTE — ASSESSMENT & PLAN NOTE
Unclear cause. Possibly secondary to medications. Remote history of alcohol abuse many years ago  Hepatitis screen was negative  Abdominal ultrasound showed fatty liver infiltration Hepatomegaly  Continue to monitor

## 2018-05-25 NOTE — CARE PLAN
Problem: Safety  Goal: Will remain free from falls    Intervention: Implement fall precautions  Patient upself independently. Educated patient on falls prevention and how to avoid becoming tangled with his multiple lines and o2 tubing. Encouraged patient to call for help before getting up. Verbalized understanding.       Problem: Knowledge Deficit  Goal: Knowledge of disease process/condition, treatment plan, diagnostic tests, and medications will improve    Intervention: Explain information regarding disease process/condition, treatment plan, diagnostic tests, and medications and document in education  Updated patient and wife on patient's status, lab results, and treatment plan. Encouraged IS use and ambulation. Verbalized understanding and acceptance of the treatment plan.

## 2018-05-25 NOTE — PROGRESS NOTES
Patient is AOx4. Educated patient and wife on status and treatment plan. Verbalized understanding.Complains of pain, medicated with PRN lidocaine and scheduled morphine see MAR. Tolerated all oral medications. On  3 L of O2 per NC, IS at bedside, able to perform correctly and effectively. Call light in use, treaded socks on, bed locked in low position

## 2018-05-25 NOTE — PROGRESS NOTES
Received report and assumed care at 0700. Pt is AAO x 4. Medicated with scheduled morphine for pain. IV rocephin push given. IV fluids d/c'd. Pt up self and showered self. IS in use and up to 2500 mL. Encouraging pt to use q1h while awake. Explained importance of it. Per pt he is on RA at baseline but currently on 3L via NC with humidification. Pulse ox on. Encouraged pt to ask questions and voice concerns. Call light at reach and hourly rounding in place.

## 2018-05-25 NOTE — DOCUMENTATION QUERY
"DOCUMENTATION QUERY    PROVIDERS: Please select “Cosign w/ note”to reply to query.    To better represent the severity of illness of your patient, please review the following information and exercise your independent professional judgment in responding to this query.     \"Sepsis\" and \"acute respiratory failure\" is documented in the Progress Notes. Based upon the clinical findings, risk factors, and treatment, can sepsis be further specified as associated or not associated with acute respiratory failure?    • Acute respiratory failure is related to sepsis (severe sepsis)  • Acute respiratory failure is not related to sepsis (sepsis)  • Other explanation of clinical findings  • Unable to determine    The medical record reflects the following:   Clinical Findings 5/23 ED Note: \"O2 sat on room air: 84\"; \"sepsis\"; \"pneumonia\" and \"hypoxia\" is documented.  5/23 BP: 90/42 - 118/65; T: 98.5; P: 70 - 113; RR: 17 - 20  5/23 WBC's: 29.6; Lactic Acid: 2.6; Blood and Urine Cultures: Negative  5/23 H&P: \"was found to be hypoxic in the 80s\"; \"sepsis\" and \"pneumonia\" is documented.  5/23 CXR: \"Right upper lobe consolidation suggestive of pneumonia\" is noted.   Treatment IVNS; zithromax; rocephin; RT protocol; oxygen    Risk Factors Age; pneumonia; post herpetic neuralgia   Location within medical record ED Note, History and Physical, Progress Notes, Lab Results , Radiology Results and MAR     Thank you,   Chela Rogers RN  Clinical   [phone]          "

## 2018-05-25 NOTE — CARE PLAN
Problem: Knowledge Deficit  Goal: Knowledge of the prescribed therapeutic regimen will improve  Outcome: PROGRESSING AS EXPECTED  Educating on IV push rocephin and PO abx. Educated on use of IS and proper positioning.     Problem: Respiratory:  Goal: Respiratory status will improve  Outcome: PROGRESSING AS EXPECTED  Encouraging the use of IS. Encouraging repositioning for optimal deep breathing and coughing.

## 2018-05-25 NOTE — PROGRESS NOTES
[facility] Hospitalist Progress Note    Date of Service: 2018    Chief Complaint  73 y.o. male admitted 2018 with community-acquired pneumonia    Interval Problem Update  Patient reports improvement subjectively. White blood cells went down to 20k. Hemoglobin down to 10.9. Platelets down to 163.  Left chest pain from zoster is  Somewhat controlled.  Patient is to home 3-4 L of oxygen. We'll wean off as tolerated.   patient feels better. He is afebrile, vitals stable. White blood cells is trending down. He still on oxygen; weaning off as tolerated. Discussed POC with him  Rejected discharge home tomorrow if continues to improve.  need to get home 02 evaluation done    Consultants/Specialty  none    Disposition  Probably home. Will order PT evaluation        Review of Systems   Constitutional: Negative for chills and fever.   HENT: Negative for hearing loss and tinnitus.    Eyes: Negative for blurred vision and double vision.   Respiratory: Positive for cough, sputum production and shortness of breath. Negative for hemoptysis and wheezing.    Cardiovascular: Negative for chest pain, palpitations and orthopnea.   Gastrointestinal: Negative for heartburn, nausea and vomiting.   Genitourinary: Negative for dysuria and urgency.   Musculoskeletal: Negative for myalgias and neck pain.   Skin: Negative for itching and rash.   Neurological: Negative for dizziness and headaches.   Endo/Heme/Allergies: Negative for environmental allergies and polydipsia. Does not bruise/bleed easily.   Psychiatric/Behavioral: Negative for depression and suicidal ideas.      Physical Exam  Laboratory/Imaging   Hemodynamics  Temp (24hrs), Av.7 °C (98 °F), Min:36.4 °C (97.6 °F), Max:36.9 °C (98.5 °F)   Temperature: 36.9 °C (98.5 °F)  Pulse  Av.1  Min: 67  Max: 113    Blood Pressure : 123/64      Respiratory      Respiration: 18, Pulse Oximetry: 94 %        RUL Breath Sounds: Diminished, RML Breath Sounds: Diminished, RLL Breath  Sounds: Diminished, KAREN Breath Sounds: Diminished, LLL Breath Sounds: Diminished    Fluids    Intake/Output Summary (Last 24 hours) at 05/25/18 1518  Last data filed at 05/25/18 0900   Gross per 24 hour   Intake             2118 ml   Output             1850 ml   Net              268 ml       Nutrition  Orders Placed This Encounter   Procedures   • Diet Order     Standing Status:   Standing     Number of Occurrences:   1     Order Specific Question:   Diet:     Answer:   Regular [1]     Physical Exam  Constitutional: He is oriented to person, place, and time. He appears well-developed and well-nourished. No distress.   HENT:   Head: Normocephalic and atraumatic.   Nose: Nose normal.   Mouth/Throat: Oropharynx is clear and moist.   Eyes: Conjunctivae and EOM are normal. Right eye exhibits no discharge. Left eye exhibits no discharge. No scleral icterus.   Neck: Normal range of motion. No tracheal deviation present. No thyromegaly present.   Cardiovascular: Normal rate, regular rhythm, normal heart sounds and intact distal pulses.    No murmur heard.  Pulses:       Radial pulses are 2+ on the right side, and 2+ on the left side.        Dorsalis pedis pulses are 2+ on the right side, and 2+ on the left side.   Pulmonary/Chest: Effort normal. No stridor. No respiratory distress. He has decreased breath sounds. He has no wheezes. He has rhonchi (bibasilar).   Abdominal: Soft. Bowel sounds are normal. He exhibits no distension. There is no tenderness.   Musculoskeletal: He exhibits no edema.   Lymphadenopathy:     He has no cervical adenopathy.   Neurological: He is alert and oriented to person, place, and time. No cranial nerve deficit.   Skin: Skin is warm. He is not diaphoretic.   Residual not active herpes zoster on left lateral chest   Psychiatric: He has a normal mood and affect. His behavior is normal. Thought content normal.   Vitals reviewed.          Recent Labs      05/23/18   1122  05/24/18   0242  05/25/18    0233   WBC  29.6*  20.1*  12.5*   RBC  4.55*  3.41*  3.46*   HEMOGLOBIN  14.6  10.9*  10.8*   HEMATOCRIT  42.1  33.0*  33.5*   MCV  92.5  96.8  96.8   MCH  32.1  32.0  31.2   MCHC  34.7  33.0*  32.2*   RDW  43.0  47.8  46.0   PLATELETCT  242  163*  172   MPV  11.2  11.1  11.3     Recent Labs      05/23/18   1122  05/24/18   0146  05/25/18   0233   SODIUM  137  135  138   POTASSIUM  4.1  4.4  3.9   CHLORIDE  104  108  109   CO2  24  23  26   GLUCOSE  123*  112*  106*   BUN  17  18  10   CREATININE  0.85  0.73  0.69   CALCIUM  9.5  8.1*  8.6     Recent Labs      05/25/18   0233   INR  1.38*     Recent Labs      05/23/18   1122   BNPBTYPENAT  58              Assessment/Plan     Acute respiratory failure with hypoxia (HCC)   Assessment & Plan    Secondary to pneumonia. Low probability of PE  Renal function as tolerated.  Continue treatment of pneumonia with antibiotics        Pneumonia   Assessment & Plan    Procalcitonin: 4  Rocephin and azithromycin  RT protocol and supplemental oxygen  Monitor vitals.  Continue antibiotics, breathing treatment. Wean off oxygen as tolerated        Post herpetic neuralgia   Assessment & Plan    Gabapentin  Capsaicin cream and lidocaine cream        Sepsis (HCC)   Assessment & Plan    This is sepsis (without associated acute organ dysfunction).   CXR: RUL consolidation  Antibiotics  IV fluids monitor serial lactic acid  Strict I's and O's and monitor labs    Appears to be resolved        Elevated LFTs   Assessment & Plan    Unclear cause. Possibly secondary to medications.  I'll check abdominal ultrasound.        Thrombocytopenia (HCC)   Assessment & Plan    Possibilities that this is from DIC. Improving. Associated with coagulopathy, probably secondary to sepsis.  Monitor          Quality-Core Measures

## 2018-05-25 NOTE — RESPIRATORY CARE
COPD EDUCATION by COPD CLINICAL EDUCATOR  5/25/2018 at 8:24 AM by Rosalba Whitmore     Patient reviewed by COPD education team. Patient does not qualify for COPD program.

## 2018-05-26 ENCOUNTER — APPOINTMENT (OUTPATIENT)
Dept: RADIOLOGY | Facility: MEDICAL CENTER | Age: 73
DRG: 871 | End: 2018-05-26
Attending: INTERNAL MEDICINE
Payer: MEDICARE

## 2018-05-26 LAB
ALBUMIN SERPL BCP-MCNC: 3.5 G/DL (ref 3.2–4.9)
ALBUMIN/GLOB SERPL: 1 G/DL
ALP SERPL-CCNC: 158 U/L (ref 30–99)
ALT SERPL-CCNC: 147 U/L (ref 2–50)
ANION GAP SERPL CALC-SCNC: 7 MMOL/L (ref 0–11.9)
AST SERPL-CCNC: 66 U/L (ref 12–45)
BASOPHILS # BLD AUTO: 0.7 % (ref 0–1.8)
BASOPHILS # BLD: 0.09 K/UL (ref 0–0.12)
BILIRUB SERPL-MCNC: 0.8 MG/DL (ref 0.1–1.5)
BUN SERPL-MCNC: 8 MG/DL (ref 8–22)
CALCIUM SERPL-MCNC: 9.5 MG/DL (ref 8.5–10.5)
CHLORIDE SERPL-SCNC: 104 MMOL/L (ref 96–112)
CO2 SERPL-SCNC: 25 MMOL/L (ref 20–33)
CREAT SERPL-MCNC: 0.66 MG/DL (ref 0.5–1.4)
EOSINOPHIL # BLD AUTO: 0.18 K/UL (ref 0–0.51)
EOSINOPHIL NFR BLD: 1.5 % (ref 0–6.9)
ERYTHROCYTE [DISTWIDTH] IN BLOOD BY AUTOMATED COUNT: 44.4 FL (ref 35.9–50)
ERYTHROCYTE [SEDIMENTATION RATE] IN BLOOD BY WESTERGREN METHOD: 89 MM/HOUR (ref 0–20)
FIBRINOGEN PPP-MCNC: 824 MG/DL (ref 215–460)
FLUAV RNA SPEC QL NAA+PROBE: NEGATIVE
FLUBV RNA SPEC QL NAA+PROBE: NEGATIVE
GLOBULIN SER CALC-MCNC: 3.6 G/DL (ref 1.9–3.5)
GLUCOSE SERPL-MCNC: 113 MG/DL (ref 65–99)
GRAM STN SPEC: NORMAL
HAV IGM SERPL QL IA: NEGATIVE
HBV CORE AB SERPL QL IA: NEGATIVE
HBV CORE IGM SER QL: NEGATIVE
HBV SURFACE AG SER QL: NEGATIVE
HCT VFR BLD AUTO: 37.2 % (ref 42–52)
HCV AB SER QL: NEGATIVE
HGB BLD-MCNC: 12.4 G/DL (ref 14–18)
IMM GRANULOCYTES # BLD AUTO: 0.05 K/UL (ref 0–0.11)
IMM GRANULOCYTES NFR BLD AUTO: 0.4 % (ref 0–0.9)
INR PPP: 1.17 (ref 0.87–1.13)
LYMPHOCYTES # BLD AUTO: 1.54 K/UL (ref 1–4.8)
LYMPHOCYTES NFR BLD: 12.5 % (ref 22–41)
MCH RBC QN AUTO: 31.2 PG (ref 27–33)
MCHC RBC AUTO-ENTMCNC: 33.3 G/DL (ref 33.7–35.3)
MCV RBC AUTO: 93.7 FL (ref 81.4–97.8)
MONOCYTES # BLD AUTO: 0.96 K/UL (ref 0–0.85)
MONOCYTES NFR BLD AUTO: 7.8 % (ref 0–13.4)
NEUTROPHILS # BLD AUTO: 9.53 K/UL (ref 1.82–7.42)
NEUTROPHILS NFR BLD: 77.1 % (ref 44–72)
NRBC # BLD AUTO: 0 K/UL
NRBC BLD-RTO: 0 /100 WBC
PLATELET # BLD AUTO: 236 K/UL (ref 164–446)
PMV BLD AUTO: 11.4 FL (ref 9–12.9)
POTASSIUM SERPL-SCNC: 3.6 MMOL/L (ref 3.6–5.5)
PROT SERPL-MCNC: 7.1 G/DL (ref 6–8.2)
PROTHROMBIN TIME: 14.6 SEC (ref 12–14.6)
RBC # BLD AUTO: 3.97 M/UL (ref 4.7–6.1)
SIGNIFICANT IND 70042: NORMAL
SITE SITE: NORMAL
SODIUM SERPL-SCNC: 136 MMOL/L (ref 135–145)
SOURCE SOURCE: NORMAL
WBC # BLD AUTO: 12.4 K/UL (ref 4.8–10.8)

## 2018-05-26 PROCEDURE — 770006 HCHG ROOM/CARE - MED/SURG/GYN SEMI*

## 2018-05-26 PROCEDURE — 700111 HCHG RX REV CODE 636 W/ 250 OVERRIDE (IP): Performed by: HOSPITALIST

## 2018-05-26 PROCEDURE — 86704 HEP B CORE ANTIBODY TOTAL: CPT

## 2018-05-26 PROCEDURE — 85652 RBC SED RATE AUTOMATED: CPT

## 2018-05-26 PROCEDURE — 85610 PROTHROMBIN TIME: CPT

## 2018-05-26 PROCEDURE — 85025 COMPLETE CBC W/AUTO DIFF WBC: CPT

## 2018-05-26 PROCEDURE — 36415 COLL VENOUS BLD VENIPUNCTURE: CPT

## 2018-05-26 PROCEDURE — 87070 CULTURE OTHR SPECIMN AEROBIC: CPT

## 2018-05-26 PROCEDURE — 80053 COMPREHEN METABOLIC PANEL: CPT

## 2018-05-26 PROCEDURE — 99233 SBSQ HOSP IP/OBS HIGH 50: CPT | Performed by: INTERNAL MEDICINE

## 2018-05-26 PROCEDURE — A9270 NON-COVERED ITEM OR SERVICE: HCPCS | Performed by: INTERNAL MEDICINE

## 2018-05-26 PROCEDURE — 700102 HCHG RX REV CODE 250 W/ 637 OVERRIDE(OP): Performed by: HOSPITALIST

## 2018-05-26 PROCEDURE — 87502 INFLUENZA DNA AMP PROBE: CPT

## 2018-05-26 PROCEDURE — 80074 ACUTE HEPATITIS PANEL: CPT

## 2018-05-26 PROCEDURE — 85384 FIBRINOGEN ACTIVITY: CPT

## 2018-05-26 PROCEDURE — A9270 NON-COVERED ITEM OR SERVICE: HCPCS | Performed by: HOSPITALIST

## 2018-05-26 PROCEDURE — 700117 HCHG RX CONTRAST REV CODE 255: Performed by: INTERNAL MEDICINE

## 2018-05-26 PROCEDURE — 76705 ECHO EXAM OF ABDOMEN: CPT

## 2018-05-26 PROCEDURE — 87205 SMEAR GRAM STAIN: CPT

## 2018-05-26 PROCEDURE — 87040 BLOOD CULTURE FOR BACTERIA: CPT

## 2018-05-26 PROCEDURE — 71275 CT ANGIOGRAPHY CHEST: CPT

## 2018-05-26 PROCEDURE — 700111 HCHG RX REV CODE 636 W/ 250 OVERRIDE (IP): Performed by: INTERNAL MEDICINE

## 2018-05-26 PROCEDURE — 700102 HCHG RX REV CODE 250 W/ 637 OVERRIDE(OP): Performed by: INTERNAL MEDICINE

## 2018-05-26 RX ORDER — LEVOFLOXACIN 5 MG/ML
750 INJECTION, SOLUTION INTRAVENOUS EVERY 24 HOURS
Status: DISCONTINUED | OUTPATIENT
Start: 2018-05-26 | End: 2018-05-27 | Stop reason: HOSPADM

## 2018-05-26 RX ORDER — OXYCODONE AND ACETAMINOPHEN 10; 325 MG/1; MG/1
2 TABLET ORAL
Status: COMPLETED | OUTPATIENT
Start: 2018-05-26 | End: 2018-05-26

## 2018-05-26 RX ADMIN — GABAPENTIN 300 MG: 300 CAPSULE ORAL at 22:16

## 2018-05-26 RX ADMIN — LIDOCAINE HYDROCHLORIDE 1 APPLICATION: 40 SOLUTION TOPICAL at 13:51

## 2018-05-26 RX ADMIN — CLONIDINE HYDROCHLORIDE 0.1 MG: 0.1 TABLET ORAL at 20:01

## 2018-05-26 RX ADMIN — AZITHROMYCIN 250 MG: 250 TABLET, FILM COATED ORAL at 08:59

## 2018-05-26 RX ADMIN — STANDARDIZED SENNA CONCENTRATE AND DOCUSATE SODIUM 2 TABLET: 8.6; 5 TABLET, FILM COATED ORAL at 22:16

## 2018-05-26 RX ADMIN — IOHEXOL 100 ML: 350 INJECTION, SOLUTION INTRAVENOUS at 11:45

## 2018-05-26 RX ADMIN — LIDOCAINE HYDROCHLORIDE 1 APPLICATION: 40 SOLUTION TOPICAL at 20:01

## 2018-05-26 RX ADMIN — MORPHINE SULFATE 15 MG: 15 TABLET, EXTENDED RELEASE ORAL at 15:17

## 2018-05-26 RX ADMIN — OXYCODONE HYDROCHLORIDE AND ACETAMINOPHEN 1 TABLET: 10; 325 TABLET ORAL at 13:51

## 2018-05-26 RX ADMIN — ENOXAPARIN SODIUM 40 MG: 100 INJECTION SUBCUTANEOUS at 08:59

## 2018-05-26 RX ADMIN — LEVOFLOXACIN 750 MG: 5 INJECTION, SOLUTION INTRAVENOUS at 15:30

## 2018-05-26 RX ADMIN — CLONIDINE HYDROCHLORIDE 0.1 MG: 0.1 TABLET ORAL at 08:59

## 2018-05-26 RX ADMIN — MORPHINE SULFATE 15 MG: 15 TABLET, EXTENDED RELEASE ORAL at 20:01

## 2018-05-26 RX ADMIN — GABAPENTIN 300 MG: 300 CAPSULE ORAL at 15:16

## 2018-05-26 RX ADMIN — OXYCODONE HYDROCHLORIDE AND ACETAMINOPHEN 1 TABLET: 10; 325 TABLET ORAL at 22:16

## 2018-05-26 RX ADMIN — STANDARDIZED SENNA CONCENTRATE AND DOCUSATE SODIUM 2 TABLET: 8.6; 5 TABLET, FILM COATED ORAL at 08:59

## 2018-05-26 RX ADMIN — MORPHINE SULFATE 15 MG: 15 TABLET, EXTENDED RELEASE ORAL at 08:59

## 2018-05-26 RX ADMIN — GABAPENTIN 300 MG: 300 CAPSULE ORAL at 05:29

## 2018-05-26 RX ADMIN — CEFTRIAXONE 2 G: 2 INJECTION, POWDER, FOR SOLUTION INTRAMUSCULAR; INTRAVENOUS at 09:27

## 2018-05-26 ASSESSMENT — PAIN SCALES - GENERAL
PAINLEVEL_OUTOF10: 6
PAINLEVEL_OUTOF10: 7
PAINLEVEL_OUTOF10: 7
PAINLEVEL_OUTOF10: 3
PAINLEVEL_OUTOF10: 4
PAINLEVEL_OUTOF10: 6
PAINLEVEL_OUTOF10: 6

## 2018-05-26 ASSESSMENT — ENCOUNTER SYMPTOMS
DIZZINESS: 0
BRUISES/BLEEDS EASILY: 0
CHILLS: 0
VOMITING: 0
DEPRESSION: 0
BLURRED VISION: 0
HEMOPTYSIS: 0
FEVER: 0
SHORTNESS OF BREATH: 1
PALPITATIONS: 0
NAUSEA: 0
HEARTBURN: 0
ORTHOPNEA: 0
POLYDIPSIA: 0
WHEEZING: 0
HEADACHES: 0
DOUBLE VISION: 0
SPUTUM PRODUCTION: 1
NECK PAIN: 0
MYALGIAS: 0
COUGH: 1

## 2018-05-26 NOTE — FACE TO FACE
Face to Face Note  -  Durable Medical Equipment    Kendall Sandra M.D. - NPI: [id number]  I certify that this patient is under my care and that they had a durable medical equipment(DME)face to face encounter by myself that meets the physician DME face-to-face encounter requirements with this patient on:    Date of encounter:   Patient:                    MRN:                       YOB: 2020  Bayron Parikh  [id number]  1945     The encounter with the patient was in whole, or in part, for the following medical condition, which is the primary reason for durable medical equipment:  Other - pneumonia    I certify that, based on my findings, the following durable medical equipment is medically necessary:  Oxygen.    HOME O2 Saturation Measurements:(Values must be present for Home Oxygen orders)  Room air sat at rest: 94  Room air sat with amb: 87  With liters of O2: 1.5, O2 sat at rest with O2: 94-96  With Liters of O2: 1.5, O2 sat with amb with O2 : 94  Is the patient mobile?: Yes    My Clinical findings support the need for the above equipment due to:  Hypoxia    Supporting Symptoms: Dyspnea

## 2018-05-26 NOTE — PROGRESS NOTES
Pt is NPO at this time, all food and drink items have been removed from bedside. Pt was educated regarding NPO status and verbalizes understanding.

## 2018-05-26 NOTE — CARE PLAN
Problem: Knowledge Deficit  Goal: Knowledge of disease process/condition, treatment plan, diagnostic tests, and medications will improve  Outcome: PROGRESSING AS EXPECTED  Pt was educated about NPO status at midnight for abdominal ultrasound. Pt verbalizes understanding.     Problem: Respiratory:  Goal: Respiratory status will improve  Outcome: PROGRESSING AS EXPECTED  Pt is on 1.5 L O2 nasal cannula,  in place pt O2 saturation is 93%. IS in use. Pt was educated to use 10x's per hour. Pt demonstrated use of IS and is at level 2,500.

## 2018-05-26 NOTE — PROGRESS NOTES
[facility]ist Progress Note    Date of Service: 5/26/2018    Chief Complaint  73 y.o. male admitted 5/23/2018 with community-acquired pneumonia    Interval Problem Update  Patient reports improvement subjectively. White blood cells went down to 20k. Hemoglobin down to 10.9. Platelets down to 163.  Left chest pain from zoster is  Somewhat controlled.  Patient is to home 3-4 L of oxygen. We'll wean off as tolerated.  5/25 patient feels better. He is afebrile, vitals stable. White blood cells is trending down. He still on oxygen; weaning off as tolerated. Discussed POC with him   discharge home tomorrow if continues to improve.  need to get home 02 evaluation done  5/26 patient feels the same as yesterday. I disappointed him telling him that he needs to stay in the hospital due to spike a fever and LFT trends and up. CTA of the pulmonary artery was done, negative for PE, showed persistent right lower and upper lobe pneumonia  Abdominal ultrasound showed hepatomegaly and fatty liver. Influenza screen is negative.  Home O2 evaluation done    Consultants/Specialty  none    Disposition   PT evaluation        Review of Systems   Constitutional: Negative for chills and fever.   HENT: Negative for hearing loss and tinnitus.    Eyes: Negative for blurred vision and double vision.   Respiratory: Positive for cough, sputum production and shortness of breath. Negative for hemoptysis and wheezing.    Cardiovascular: Negative for chest pain, palpitations and orthopnea.   Gastrointestinal: Negative for heartburn, nausea and vomiting.   Genitourinary: Negative for dysuria and urgency.   Musculoskeletal: Negative for myalgias and neck pain.   Skin: Negative for itching and rash.   Neurological: Negative for dizziness and headaches.   Endo/Heme/Allergies: Negative for environmental allergies and polydipsia. Does not bruise/bleed easily.   Psychiatric/Behavioral: Negative for depression and suicidal ideas.      Physical Exam   Laboratory/Imaging   Hemodynamics  Temp (24hrs), Av.5 °C (99.5 °F), Min:36.7 °C (98 °F), Max:38.3 °C (100.9 °F)   Temperature: 37.3 °C (99.2 °F)  Pulse  Av.3  Min: 67  Max: 113    Blood Pressure : 156/82      Respiratory      Respiration: (!) 22, Pulse Oximetry: 94 %        RUL Breath Sounds: Diminished, RML Breath Sounds: Diminished, RLL Breath Sounds: Diminished, KAREN Breath Sounds: Diminished, LLL Breath Sounds: Diminished    Fluids    Intake/Output Summary (Last 24 hours) at 18 1412  Last data filed at 18 2100   Gross per 24 hour   Intake              480 ml   Output                0 ml   Net              480 ml       Nutrition  Orders Placed This Encounter   Procedures   • Diet Order     Standing Status:   Standing     Number of Occurrences:   1     Order Specific Question:   Diet:     Answer:   Regular [1]     Physical Exam  Constitutional: He is oriented to person, place, and time. He appears well-developed and well-nourished. No distress.   HENT:   Head: Normocephalic and atraumatic.   Nose: Nose normal.   Mouth/Throat: Oropharynx is clear and moist.   Eyes: Conjunctivae and EOM are normal. Right eye exhibits no discharge. Left eye exhibits no discharge. No scleral icterus.   Neck: Normal range of motion. No tracheal deviation present. No thyromegaly present.   Cardiovascular: Normal rate, regular rhythm, normal heart sounds and intact distal pulses.    No murmur heard.  Pulses:       Radial pulses are 2+ on the right side, and 2+ on the left side.        Dorsalis pedis pulses are 2+ on the right side, and 2+ on the left side.   Pulmonary/Chest: Effort normal. No stridor. No respiratory distress. He has decreased breath sounds. He has no wheezes. He has rhonchi (bibasilar).   Abdominal: Soft. Bowel sounds are normal. He exhibits no distension. There is no tenderness.   Musculoskeletal: He exhibits no edema.   Lymphadenopathy:     He has no cervical adenopathy.   Neurological: He is alert  and oriented to person, place, and time. No cranial nerve deficit.   Skin: Skin is warm. He is not diaphoretic.   Residual not active herpes zoster on left lateral chest   Psychiatric: He has a normal mood and affect. His behavior is normal. Thought content normal.   Vitals reviewed.          Recent Labs      05/25/18 0233 05/25/18 2017 05/26/18   0927   WBC  12.5*  12.6*  12.4*   RBC  3.46*  3.57*  3.97*   HEMOGLOBIN  10.8*  11.4*  12.4*   HEMATOCRIT  33.5*  33.3*  37.2*   MCV  96.8  93.3  93.7   MCH  31.2  31.9  31.2   MCHC  32.2*  34.2  33.3*   RDW  46.0  44.5  44.4   PLATELETCT  172  195  236   MPV  11.3  11.7  11.4     Recent Labs      05/25/18 0233 05/25/18 2017 05/26/18   0927   SODIUM  138  133*  136   POTASSIUM  3.9  3.9  3.6   CHLORIDE  109  103  104   CO2  26  23  25   GLUCOSE  106*  117*  113*   BUN  10  10  8   CREATININE  0.69  0.92  0.66   CALCIUM  8.6  9.1  9.5     Recent Labs      05/25/18 0233 05/26/18   0927   INR  1.38*  1.17*                  Assessment/Plan     Acute respiratory failure with hypoxia (HCC)   Assessment & Plan    Secondary to pneumonia. Low probability of PE  Renal function as tolerated.  Continue treatment of pneumonia with antibiotics        Pneumonia   Assessment & Plan    Procalcitonin: 4  Was started on Rocephin and azithromycin; changed to Levaquin due to spike of fever  RT protocol and supplemental oxygen  Monitor vitals.  Continue antibiotics, breathing treatment. Wean off oxygen as tolerated        Post herpetic neuralgia   Assessment & Plan    Gabapentin  Capsaicin cream and lidocaine cream        Sepsis (HCC)   Assessment & Plan    This is sepsis (without associated acute organ dysfunction).   CXR: RUL consolidation  Antibiotics  IV fluids monitor serial lactic acid  Strict I's and O's and monitor labs    Appears to be resolved        Elevated LFTs   Assessment & Plan    Unclear cause. Possibly secondary to medications. Remote history of alcohol abuse  many years ago  Hepatitis screen was negative  Abdominal ultrasound showed fatty liver infiltration Hepatomegaly  Continue to monitor        Thrombocytopenia (HCC)   Assessment & Plan    Possibilities that this is from DIC. Improving. Associated with coagulopathy, probably secondary to sepsis.  Monitor          Quality-Core Measures

## 2018-05-26 NOTE — CARE PLAN
Problem: Knowledge Deficit  Goal: Knowledge of disease process/condition, treatment plan, diagnostic tests, and medications will improve  Outcome: PROGRESSING AS EXPECTED  Educating on procedures, liver scan and ct, educating on medications, educating on rule out influenza. Pt verbalizes understanding     Problem: Respiratory:  Goal: Respiratory status will improve  Outcome: PROGRESSING AS EXPECTED  Use of IS, encouraging repositioning, encouraging deep breathing, weaning off oxygen

## 2018-05-26 NOTE — PROGRESS NOTES
"Received report and assumed care at 0700. Pt is AAO x 4 and appears anxious about all the testing that has been added, \"I thought I was going home\". Have reinforced that we want to provide pt with a safe discharge and make sure that we address anything that is at all somewhat concerning. Pt verbalizes understanding about imaging and new labs that have been ordered. He has had a liver ultrasound and a CT performed already. He is self motivated on the IS and is up to 2500 mL. Pt still encouraged to not go home on O2. Currently resting in bed. Ambulates independently. Bed locked and in low position, call light at reach, hourly rounding in place.   "

## 2018-05-26 NOTE — PROGRESS NOTES
Pt had elevated temperature of 100.9 F, temperature was taken orally. Pt was medicated per MAR with Tylenol. Temperature was rechecked and is now 99.0 F.

## 2018-05-27 ENCOUNTER — APPOINTMENT (OUTPATIENT)
Dept: RADIOLOGY | Facility: MEDICAL CENTER | Age: 73
DRG: 871 | End: 2018-05-27
Attending: NURSE PRACTITIONER
Payer: MEDICARE

## 2018-05-27 VITALS
TEMPERATURE: 98.8 F | WEIGHT: 181 LBS | SYSTOLIC BLOOD PRESSURE: 131 MMHG | HEIGHT: 70 IN | DIASTOLIC BLOOD PRESSURE: 63 MMHG | HEART RATE: 75 BPM | OXYGEN SATURATION: 91 % | BODY MASS INDEX: 25.91 KG/M2 | RESPIRATION RATE: 14 BRPM

## 2018-05-27 PROBLEM — D69.6 THROMBOCYTOPENIA (HCC): Status: RESOLVED | Noted: 2018-05-24 | Resolved: 2018-05-27

## 2018-05-27 PROBLEM — R79.89 ELEVATED LFTS: Status: RESOLVED | Noted: 2018-05-25 | Resolved: 2018-05-27

## 2018-05-27 PROBLEM — J96.01 ACUTE RESPIRATORY FAILURE WITH HYPOXIA (HCC): Status: RESOLVED | Noted: 2018-05-24 | Resolved: 2018-05-27

## 2018-05-27 PROBLEM — A41.9 SEPSIS (HCC): Status: RESOLVED | Noted: 2018-05-23 | Resolved: 2018-05-27

## 2018-05-27 LAB
ALBUMIN SERPL BCP-MCNC: 4 G/DL (ref 3.2–4.9)
ALBUMIN/GLOB SERPL: 1.3 G/DL
ALP SERPL-CCNC: 144 U/L (ref 30–99)
ALT SERPL-CCNC: 91 U/L (ref 2–50)
ANION GAP SERPL CALC-SCNC: 10 MMOL/L (ref 0–11.9)
AST SERPL-CCNC: 22 U/L (ref 12–45)
BASOPHILS # BLD AUTO: 0.9 % (ref 0–1.8)
BASOPHILS # BLD: 0.1 K/UL (ref 0–0.12)
BILIRUB SERPL-MCNC: 0.6 MG/DL (ref 0.1–1.5)
BUN SERPL-MCNC: 9 MG/DL (ref 8–22)
CALCIUM SERPL-MCNC: 9.6 MG/DL (ref 8.5–10.5)
CHLORIDE SERPL-SCNC: 103 MMOL/L (ref 96–112)
CO2 SERPL-SCNC: 27 MMOL/L (ref 20–33)
CREAT SERPL-MCNC: 0.64 MG/DL (ref 0.5–1.4)
EOSINOPHIL # BLD AUTO: 0.32 K/UL (ref 0–0.51)
EOSINOPHIL NFR BLD: 2.7 % (ref 0–6.9)
ERYTHROCYTE [DISTWIDTH] IN BLOOD BY AUTOMATED COUNT: 45.2 FL (ref 35.9–50)
GLOBULIN SER CALC-MCNC: 3 G/DL (ref 1.9–3.5)
GLUCOSE SERPL-MCNC: 102 MG/DL (ref 65–99)
HCT VFR BLD AUTO: 37.9 % (ref 42–52)
HGB BLD-MCNC: 12.4 G/DL (ref 14–18)
IMM GRANULOCYTES # BLD AUTO: 0.05 K/UL (ref 0–0.11)
IMM GRANULOCYTES NFR BLD AUTO: 0.4 % (ref 0–0.9)
LYMPHOCYTES # BLD AUTO: 1.92 K/UL (ref 1–4.8)
LYMPHOCYTES NFR BLD: 16.3 % (ref 22–41)
MCH RBC QN AUTO: 31.3 PG (ref 27–33)
MCHC RBC AUTO-ENTMCNC: 32.7 G/DL (ref 33.7–35.3)
MCV RBC AUTO: 95.7 FL (ref 81.4–97.8)
MONOCYTES # BLD AUTO: 1.33 K/UL (ref 0–0.85)
MONOCYTES NFR BLD AUTO: 11.3 % (ref 0–13.4)
NEUTROPHILS # BLD AUTO: 8.03 K/UL (ref 1.82–7.42)
NEUTROPHILS NFR BLD: 68.4 % (ref 44–72)
NRBC # BLD AUTO: 0 K/UL
NRBC BLD-RTO: 0 /100 WBC
PLATELET # BLD AUTO: 259 K/UL (ref 164–446)
PMV BLD AUTO: 10.7 FL (ref 9–12.9)
POTASSIUM SERPL-SCNC: 4 MMOL/L (ref 3.6–5.5)
PROT SERPL-MCNC: 7 G/DL (ref 6–8.2)
RBC # BLD AUTO: 3.96 M/UL (ref 4.7–6.1)
SODIUM SERPL-SCNC: 140 MMOL/L (ref 135–145)
WBC # BLD AUTO: 11.8 K/UL (ref 4.8–10.8)

## 2018-05-27 PROCEDURE — 99239 HOSP IP/OBS DSCHRG MGMT >30: CPT | Performed by: INTERNAL MEDICINE

## 2018-05-27 PROCEDURE — A9270 NON-COVERED ITEM OR SERVICE: HCPCS | Performed by: HOSPITALIST

## 2018-05-27 PROCEDURE — 700111 HCHG RX REV CODE 636 W/ 250 OVERRIDE (IP): Performed by: HOSPITALIST

## 2018-05-27 PROCEDURE — 71046 X-RAY EXAM CHEST 2 VIEWS: CPT

## 2018-05-27 PROCEDURE — 36415 COLL VENOUS BLD VENIPUNCTURE: CPT

## 2018-05-27 PROCEDURE — 700102 HCHG RX REV CODE 250 W/ 637 OVERRIDE(OP): Performed by: INTERNAL MEDICINE

## 2018-05-27 PROCEDURE — 700111 HCHG RX REV CODE 636 W/ 250 OVERRIDE (IP): Performed by: INTERNAL MEDICINE

## 2018-05-27 PROCEDURE — A9270 NON-COVERED ITEM OR SERVICE: HCPCS | Performed by: INTERNAL MEDICINE

## 2018-05-27 PROCEDURE — 80053 COMPREHEN METABOLIC PANEL: CPT

## 2018-05-27 PROCEDURE — 700102 HCHG RX REV CODE 250 W/ 637 OVERRIDE(OP): Performed by: HOSPITALIST

## 2018-05-27 PROCEDURE — 85025 COMPLETE CBC W/AUTO DIFF WBC: CPT

## 2018-05-27 RX ORDER — LEVOFLOXACIN 500 MG/1
750 TABLET, FILM COATED ORAL DAILY
Qty: 6 TAB | Refills: 0 | Status: SHIPPED | OUTPATIENT
Start: 2018-05-27 | End: 2018-05-27

## 2018-05-27 RX ORDER — LIDOCAINE HYDROCHLORIDE 40 MG/ML
1 SOLUTION TOPICAL 2 TIMES DAILY PRN
Qty: 1 BOTTLE | Refills: 0 | Status: ON HOLD | OUTPATIENT
Start: 2018-05-27 | End: 2019-03-14

## 2018-05-27 RX ORDER — LEVOFLOXACIN 500 MG/1
750 TABLET, FILM COATED ORAL DAILY
Qty: 6 TAB | Refills: 0 | Status: SHIPPED | OUTPATIENT
Start: 2018-05-27 | End: 2018-05-31

## 2018-05-27 RX ADMIN — OXYCODONE HYDROCHLORIDE AND ACETAMINOPHEN 2 TABLET: 10; 325 TABLET ORAL at 10:51

## 2018-05-27 RX ADMIN — MORPHINE SULFATE 15 MG: 15 TABLET, EXTENDED RELEASE ORAL at 08:51

## 2018-05-27 RX ADMIN — STANDARDIZED SENNA CONCENTRATE AND DOCUSATE SODIUM 2 TABLET: 8.6; 5 TABLET, FILM COATED ORAL at 08:51

## 2018-05-27 RX ADMIN — LIDOCAINE HYDROCHLORIDE 1 APPLICATION: 40 SOLUTION TOPICAL at 10:51

## 2018-05-27 RX ADMIN — GABAPENTIN 300 MG: 300 CAPSULE ORAL at 07:00

## 2018-05-27 RX ADMIN — CLONIDINE HYDROCHLORIDE 0.1 MG: 0.1 TABLET ORAL at 08:51

## 2018-05-27 RX ADMIN — LEVOFLOXACIN 750 MG: 5 INJECTION, SOLUTION INTRAVENOUS at 08:51

## 2018-05-27 RX ADMIN — ENOXAPARIN SODIUM 40 MG: 100 INJECTION SUBCUTANEOUS at 08:51

## 2018-05-27 ASSESSMENT — PAIN SCALES - GENERAL
PAINLEVEL_OUTOF10: 6
PAINLEVEL_OUTOF10: 0
PAINLEVEL_OUTOF10: 6

## 2018-05-27 NOTE — DISCHARGE SUMMARY
CHIEF COMPLAINT ON ADMISSION  Chief Complaint   Patient presents with   • Chest Pain   • Cough   • Shortness of Breath       CODE STATUS  Full Code    HPI & HOSPITAL COURSE  This is a 73 y.o. male with history of shingles, asthma, prior 30 year pack history of smoking here with pneumonia, sepsis and postherpetic neuralgia. Please see the previously dictated history of present illness May 23, 2018 by Dr. Leon Acevedo for complete details. In short the patient presented with shortness of breath over the last week, chills and fever. He was found to have hypoxemia and right upper lobe consolidation on chest x-ray suggestive of pneumonia. CT scan of the chest was negative for pulmonary embolism and confirmed alveolar consolidation and groundglass opacity in the right upper lobe suggestive of pneumonia and/or edema. The patient's white blood cell count on admission was over 29,000.    The patient was admitted and received fluid rehydration along with IV antibiotics ceftriaxone and azithromycin. The patient had a recurrent fever of approximately 100.2°F on May 25 and his antibiotics were switched to levofloxacin with subsequent resolution of his fever. The patient verbalized marked improvement in his symptoms and was saturating on room air above 90% on the day of discharge. On the day of discharge his white blood cell count is reported at 11.8, hemoglobin 12.4, hematocrit 37.9, absolute neutrophil count 8.03 and normal granulocytes. Influenza studies were negative.    The patient did have slight elevation of his liver enzymes with AST reported a size 73 and healthy 147 and alk phos 158. Coumadin discharge this he's had declined to AST of 22, ALP of 91, and alk phosphatase of 144. Ultrasound of the liver and biliary tree revealed a large liver consistent with fatty infiltration, no cholelithiasis or biliary dilatation or evidence of acute cholecystitis. Hepatitis panel was negative. Patient's abdominal exam was nontender and  nondistended. He was tolerating a regular diet without complaints.    Due to improvement in his resting and ambulating pulse oximetry will be sent home without a new set up for oxygen. He will b given a prescription for Levaquin 500 mg, with instructions to take 1-1/2 tablets daily for 4 days. He'll follow-up with his primary care provider for repeat chest x-ray in 2-3 weeks to ensure resolution of his chest findings. Should he experience any exacerbation of the symptoms are deterioration of his condition he should return to the emergency room for further evaluation. He was also given a rx for topical xylocaine solutions 4% to use BID PRN shingles pain per his request. However, I informed him this may not be covered or available as an outpatient and to discuss further with his PCP.     The patient met 2-midnight criteria for an inpatient stay at the time of discharge.    Therefore, he is discharged in good and stable condition with close outpatient follow-up.    SPECIFIC OUTPATIENT FOLLOW-UP  PCP    DISCHARGE PROBLEM LIST  Active Problems:    Pneumonia POA: Yes    Post herpetic neuralgia POA: Yes  Resolved Problems:    Acute respiratory failure with hypoxia (HCC) POA: Yes    Sepsis (HCC) POA: Unknown    Thrombocytopenia (HCC) POA: Unknown    Elevated LFTs POA: Unknown      FOLLOW UP  No future appointments.  No follow-up provider specified.    MEDICATIONS ON DISCHARGE   Bayron Parikh   [facility] Medication Instructions ANTONIO:[id number]    Printed on:05/27/18 1140   Medication Information                      clonidine (CATAPRES) 0.1 MG Tab  Take 0.1 mg by mouth 2 Times a Day.             diazepam (VALIUM) 10 MG tablet  Take 1 Tab by mouth every 6 hours as needed for Anxiety.             gabapentin (NEURONTIN) 300 MG Cap  Take 300 mg by mouth 3 times a day.             levoFLOXacin (LEVAQUIN) 500 MG tablet  Take 1.5 Tabs by mouth every day for 4 days.             morphine ER (MS CONTIN) 15 MG Tab CR tablet  Take 1 Tab by  mouth 3 times a day.             oxycodone-acetaminophen (PERCOCET-10)  MG Tab  Take 1-2 Tabs by mouth every 6 hours as needed for Severe Pain.                 DIET  Orders Placed This Encounter   Procedures   • Diet Order     Standing Status:   Standing     Number of Occurrences:   1     Order Specific Question:   Diet:     Answer:   Regular [1]       ACTIVITY  As tolerated.  Weight bearing as tolerated      CONSULTATIONS  None    PROCEDURES  None    LABORATORY  Lab Results   Component Value Date/Time    SODIUM 140 05/27/2018 07:47 AM    POTASSIUM 4.0 05/27/2018 07:47 AM    CHLORIDE 103 05/27/2018 07:47 AM    CO2 27 05/27/2018 07:47 AM    GLUCOSE 102 (H) 05/27/2018 07:47 AM    BUN 9 05/27/2018 07:47 AM    CREATININE 0.64 05/27/2018 07:47 AM        Lab Results   Component Value Date/Time    WBC 11.8 (H) 05/27/2018 07:47 AM    HEMOGLOBIN 12.4 (L) 05/27/2018 07:47 AM    HEMATOCRIT 37.9 (L) 05/27/2018 07:47 AM    PLATELETCT 259 05/27/2018 07:47 AM        Total time of the discharge process exceeds 38 minutes

## 2018-05-27 NOTE — CARE PLAN
Problem: Bowel/Gastric:  Goal: Normal bowel function is maintained or improved  Outcome: PROGRESSING AS EXPECTED  Stool softeners in use, encouraging fluids     Problem: Respiratory:  Goal: Respiratory status will improve  Outcome: PROGRESSING AS EXPECTED  RT will be ordered per MD. IS in use. Coughing and deep breathing.

## 2018-05-27 NOTE — PROGRESS NOTES
Patient has now discharged. Offered a wheelchair and patient refused. Offered to walk out with patient and he refused. Toileting offered before he left the unit. All personal belongings are with the patient as well as prescription for lidocaine ointment.

## 2018-05-27 NOTE — PROGRESS NOTES
Received report and assumed care at 0700. Pt is AAO x 4 and anxious about the possibility of not going home. Continuing to encourage IS, ambulation, and cough and deep breathing. He continues to have a cough and he is bringing up green sputum. Second dose of Levaquin has been started. X-ray has called and has placed pt on the transport list for 10. He is sitting up in bed eating breakfast on RA saturating at 91% currently. Will see how his saturation is with ambulation. Bed locked and in low position, pt ambulates independently, call light at reach, and hourly rounding in place.

## 2018-05-27 NOTE — PROGRESS NOTES
Received bedside report and accepted care of patient. Patient currently resting in bed in no visible or stated signs of distress. Bed controls on and bed in locked and lowest position. Call light and personal possessions within reach. Patient educated about use of call light and verbalized understanding.     Pt is on 0.5 L O2 and O2 saturation is at 93%. IS is in use pt is using at least 10 times per hour.

## 2018-05-27 NOTE — CARE PLAN
Problem: Respiratory:  Goal: Respiratory status will improve  Outcome: PROGRESSING AS EXPECTED  Pt is on 0.5 L O2,  is in place, oxygen saturation is 94%. IS is in use frequently, pt was educated to use ten time per hour.     Problem: Pain Management  Goal: Pain level will decrease to patient's comfort goal  Outcome: PROGRESSING AS EXPECTED  Pain level was 7 on a scale of 1-10, pt was given morphine and percocet per MAR. Pain level has decreased to comfort goat.

## 2018-05-27 NOTE — DISCHARGE INSTRUCTIONS
Discharge Instructions    Discharged to home by car with relative. Discharged via walking, hospital escort: Refused.  Special equipment needed: Not Applicable    Be sure to schedule a follow-up appointment with your primary care doctor or any specialists as instructed.     Discharge Plan:   Diet Plan: Discussed  Activity Level: Discussed  Confirmed Follow up Appointment: Patient to Call and Schedule Appointment  Confirmed Symptoms Management: Discussed  Medication Reconciliation Updated: Yes  Influenza Vaccine Indication: Not indicated: Previously immunized this influenza season and > 8 years of age    I understand that a diet low in cholesterol, fat, and sodium is recommended for good health. Unless I have been given specific instructions below for another diet, I accept this instruction as my diet prescription.   Other diet: Regular (Resume previous diet as tolerated)    Special Instructions: Sepsis, Adult  Sepsis is a serious infection of your blood or tissues that affects your whole body. The infection that causes sepsis may be bacterial, viral, fungal, or parasitic. Sepsis may be life threatening. Sepsis can cause your blood pressure to drop. This may result in shock. Shock causes your central nervous system and your organs to stop working correctly.  What increases the risk?  Sepsis can happen in anyone, but it is more likely to happen in people who have weakened immune systems.  What are the signs or symptoms?  Symptoms of sepsis can include:  · Fever or low body temperature (hypothermia).  · Rapid breathing (hyperventilation).  · Chills.  · Rapid heartbeat (tachycardia).  · Confusion or light-headedness.  · Trouble breathing.  · Urinating much less than usual.  · Cool, clammy skin or red, flushed skin.  · Other problems with the heart, kidneys, or brain.  How is this diagnosed?  Your health care provider will likely do tests to look for an infection, to see if the infection has spread to your blood, and to  see how serious your condition is. Tests can include:  · Blood tests, including cultures of your blood.  · Cultures of other fluids from your body, such as:  ¨ Urine.  ¨ Pus from wounds.  ¨ Mucus coughed up from your lungs.  · Urine tests other than cultures.  · X-ray exams or other imaging tests.  How is this treated?  Treatment will begin with elimination of the source of infection. If your sepsis is likely caused by a bacterial or fungal infection, you will be given antibiotic or antifungal medicines.  You may also receive:  · Oxygen.  · Fluids through an IV tube.  · Medicines to increase your blood pressure.  · A machine to clean your blood (dialysis) if your kidneys fail.  · A machine to help you breathe if your lungs fail.  Get help right away if:  You get an infection or develop any of the signs and symptoms of sepsis after surgery or a hospitalization.  This information is not intended to replace advice given to you by your health care provider. Make sure you discuss any questions you have with your health care provider.  Document Released: 09/15/2004 Document Revised: 05/25/2017 Document Reviewed: 08/25/2014  [employer] Interactive Patient Education © 2017 [employer]      · Is patient discharged on Warfarin / Coumadin?   No     Depression / Suicide Risk    As you are discharged from this [facility] facility, it is important to learn how to keep safe from harming yourself.    Recognize the warning signs:  · Abrupt changes in personality, positive or negative- including increase in energy   · Giving away possessions  · Change in eating patterns- significant weight changes-  positive or negative  · Change in sleeping patterns- unable to sleep or sleeping all the time   · Unwillingness or inability to communicate  · Depression  · Unusual sadness, discouragement and loneliness  · Talk of wanting to die  · Neglect of personal appearance   · Rebelliousness- reckless behavior  · Withdrawal from  people/activities they love  · Confusion- inability to concentrate     If you or a loved one observes any of these behaviors or has concerns about self-harm, here's what you can do:  · Talk about it- your feelings and reasons for harming yourself  · Remove any means that you might use to hurt yourself (examples: pills, rope, extension cords, firearm)  · Get professional help from the community (Mental Health, Substance Abuse, psychological counseling)  · Do not be alone:Call your Safe Contact- someone whom you trust who will be there for you.  · Call your local CRISIS HOTLINE 633-0688 or 555-634-7457  · Call your local Children's Mobile Crisis Response Team [place] (252) 717-3845 or www.ContentRealtime  · Call the toll free National Suicide Prevention Hotlines   · National Suicide Prevention Lifeline 185-902-JCMU (5733)  · National Hope Line Network 800-SUICIDE (053-2859)

## 2018-05-28 LAB
BACTERIA BLD CULT: NORMAL
BACTERIA BLD CULT: NORMAL
BACTERIA SPEC RESP CULT: NORMAL
GRAM STN SPEC: NORMAL
SIGNIFICANT IND 70042: NORMAL
SITE SITE: NORMAL
SOURCE SOURCE: NORMAL

## 2018-05-31 LAB
BACTERIA BLD CULT: NORMAL
BACTERIA BLD CULT: NORMAL
SIGNIFICANT IND 70042: NORMAL
SIGNIFICANT IND 70042: NORMAL
SITE SITE: NORMAL
SITE SITE: NORMAL
SOURCE SOURCE: NORMAL
SOURCE SOURCE: NORMAL

## 2018-06-06 ENCOUNTER — OFFICE VISIT (OUTPATIENT)
Dept: MEDICAL GROUP | Facility: MEDICAL CENTER | Age: 73
End: 2018-06-06
Payer: MEDICARE

## 2018-06-06 VITALS
HEART RATE: 94 BPM | HEIGHT: 70 IN | SYSTOLIC BLOOD PRESSURE: 142 MMHG | RESPIRATION RATE: 16 BRPM | OXYGEN SATURATION: 95 % | WEIGHT: 174 LBS | TEMPERATURE: 98.9 F | BODY MASS INDEX: 24.91 KG/M2 | DIASTOLIC BLOOD PRESSURE: 76 MMHG

## 2018-06-06 DIAGNOSIS — B02.29 POST HERPETIC NEURALGIA: ICD-10-CM

## 2018-06-06 DIAGNOSIS — J18.9 PNEUMONIA OF RIGHT UPPER LOBE DUE TO INFECTIOUS ORGANISM: ICD-10-CM

## 2018-06-06 PROCEDURE — 99213 OFFICE O/P EST LOW 20 MIN: CPT | Performed by: NURSE PRACTITIONER

## 2018-06-06 ASSESSMENT — ENCOUNTER SYMPTOMS
MYALGIAS: 1
COUGH: 1

## 2018-06-06 NOTE — PROGRESS NOTES
Subjective:      Bayron Parikh is a 73 y.o. male who presents with Hospital Follow-up        CC: Patient here today accompanied by his wife for hospital follow-up for pneumonia as well as postherpetic neuralgia.    HPI Bayron Parikh      1. Pneumonia of right upper lobe due to infectious organism (HCC)  Patient went to the emergency room on May 23 with increasing concerns regarding shortness of breath, fever and chills. He had subsequent chest x-ray and CT of the lung which showed probable right upper lobe pneumonia. It was negative for PE. Patient responded well to antibiotics and was sent home on Levaquin. His initial white blood cell count was very high but had stabilized by discharge. Patient states as of today he is about 90% better and has not needed oxygen and pulse ox in the office today is good at 95%. He has remained afebrile and has no complaints of cough or shortness of breath. He is a former smoker. He was advised to have repeat chest x-ray in a few weeks. He also had some liver enzyme elevations in the hospital probably related to medication and ultrasound showed fatty liver only.    2. Post herpetic neuralgia  Patient was treated for shingles on March 27 and apparently developed postherpetic neuralgia after this. He goes to pain management and they have him now on gabapentin with some success. He does also have some pain in his left upper arm and shoulder which is worse with movement of the arm. He has not talked to pain management about this.  Social History   Substance Use Topics   • Smoking status: Former Smoker     Packs/day: 1.00     Years: 39.00     Types: Cigarettes     Quit date: 11/1/2000   • Smokeless tobacco: Never Used      Comment: started smoking at age 16   • Alcohol use No     Current Outpatient Prescriptions   Medication Sig Dispense Refill   • lidocaine (XYLOCAINE) 4 % Solution Apply 1 Application to affected area(s) 2 times a day as needed (herpetic neuralgia). 1 Bottle 0   •  "gabapentin (NEURONTIN) 300 MG Cap Take 300 mg by mouth 3 times a day.     • clonidine (CATAPRES) 0.1 MG Tab Take 0.1 mg by mouth 2 Times a Day.  2   • diazepam (VALIUM) 10 MG tablet Take 1 Tab by mouth every 6 hours as needed for Anxiety. 30 Tab 0   • morphine ER (MS CONTIN) 15 MG Tab CR tablet Take 1 Tab by mouth 3 times a day.     • oxycodone-acetaminophen (PERCOCET-10)  MG Tab Take 1-2 Tabs by mouth every 6 hours as needed for Severe Pain.       No current facility-administered medications for this visit.      Past Medical History:   Diagnosis Date   • Asthma    • Shingles 03/01/2018   • Shingles 05/01/2018     Family History   Problem Relation Age of Onset   • Cancer Father      Esophogial/smoker   • No Known Problems Mother    • No Known Problems Brother    • No Known Problems Son    • No Known Problems Daughter    • No Known Problems Daughter    • No Known Problems Maternal Grandmother    • No Known Problems Maternal Grandfather    • No Known Problems Paternal Grandmother    • No Known Problems Paternal Grandfather        Review of Systems   Respiratory: Positive for cough.    Musculoskeletal: Positive for myalgias.   All other systems reviewed and are negative.         Objective:     /76   Pulse 94   Temp 37.2 °C (98.9 °F)   Resp 16   Ht 1.778 m (5' 10\")   Wt 78.9 kg (174 lb)   SpO2 95%   BMI 24.97 kg/m²      Physical Exam   Constitutional: He is oriented to person, place, and time. He appears well-developed and well-nourished. No distress.   HENT:   Head: Normocephalic and atraumatic.   Right Ear: External ear normal.   Left Ear: External ear normal.   Nose: Nose normal.   Mouth/Throat: Oropharynx is clear and moist.   Eyes: Conjunctivae are normal. Right eye exhibits no discharge. Left eye exhibits no discharge.   Neck: Normal range of motion. Neck supple. No tracheal deviation present. No thyromegaly present.   Cardiovascular: Normal rate, regular rhythm and normal heart sounds.    No " murmur heard.  Pulmonary/Chest: Effort normal and breath sounds normal. No respiratory distress. He has no wheezes. He has no rales.   Musculoskeletal:   Pain reported in left upper arm and shoulder and radial and brachial pulses are normal, there is no edema of the hand or arm, and there are no color or temperature changes. Palpation over the affected area shows no tenderness.   Lymphadenopathy:     He has no cervical adenopathy.   Neurological: He is alert and oriented to person, place, and time. Coordination normal.   Skin: Skin is warm and dry. No rash noted. He is not diaphoretic. No erythema.   Fading rash along the dermatome of the left mid back along to the left side.   Psychiatric: He has a normal mood and affect. His behavior is normal. Judgment and thought content normal.   Nursing note and vitals reviewed.              Assessment/Plan:     1. Pneumonia of right upper lobe due to infectious organism (HCC)  Patient appears to be doing much better post hospitalization and he will do follow-up chest x-ray later this month. I also will do lab work to be sure that his white blood cell count has returned to normal and a chemistry panel to check liver enzymes. In the hospital his ultrasound of the abdomen showed only fatty liver and hepatitis panel was negative. He is to avoid secondhand smoke.  - DX-CHEST-2 VIEWS; Future  - COMP METABOLIC PANEL; Future  - CBC WITH DIFFERENTIAL; Future    2. Post herpetic neuralgia  Patient currently on gabapentin through pain management. He is complaining now pain in his left arm and shoulder which may be related to the shingles or how he has been holding his arm from the pain. Offered to do x-rays but he declined. I recommended he see his pain management specialist about this pain if it continues.

## 2018-06-25 ENCOUNTER — HOSPITAL ENCOUNTER (OUTPATIENT)
Dept: RADIOLOGY | Facility: MEDICAL CENTER | Age: 73
End: 2018-06-25
Attending: NURSE PRACTITIONER
Payer: MEDICARE

## 2018-06-25 ENCOUNTER — HOSPITAL ENCOUNTER (OUTPATIENT)
Dept: LAB | Facility: MEDICAL CENTER | Age: 73
End: 2018-06-25
Attending: NURSE PRACTITIONER
Payer: MEDICARE

## 2018-06-25 DIAGNOSIS — J18.9 PNEUMONIA OF RIGHT UPPER LOBE DUE TO INFECTIOUS ORGANISM: ICD-10-CM

## 2018-06-25 LAB
ALBUMIN SERPL BCP-MCNC: 3.9 G/DL (ref 3.2–4.9)
ALBUMIN/GLOB SERPL: 1.2 G/DL
ALP SERPL-CCNC: 85 U/L (ref 30–99)
ALT SERPL-CCNC: 20 U/L (ref 2–50)
ANION GAP SERPL CALC-SCNC: 4 MMOL/L (ref 0–11.9)
AST SERPL-CCNC: 17 U/L (ref 12–45)
BASOPHILS # BLD AUTO: 1.8 % (ref 0–1.8)
BASOPHILS # BLD: 0.16 K/UL (ref 0–0.12)
BILIRUB SERPL-MCNC: 0.3 MG/DL (ref 0.1–1.5)
BUN SERPL-MCNC: 16 MG/DL (ref 8–22)
CALCIUM SERPL-MCNC: 9.5 MG/DL (ref 8.5–10.5)
CHLORIDE SERPL-SCNC: 107 MMOL/L (ref 96–112)
CO2 SERPL-SCNC: 28 MMOL/L (ref 20–33)
CREAT SERPL-MCNC: 0.96 MG/DL (ref 0.5–1.4)
EOSINOPHIL # BLD AUTO: 0.33 K/UL (ref 0–0.51)
EOSINOPHIL NFR BLD: 3.7 % (ref 0–6.9)
ERYTHROCYTE [DISTWIDTH] IN BLOOD BY AUTOMATED COUNT: 47.5 FL (ref 35.9–50)
GLOBULIN SER CALC-MCNC: 3.3 G/DL (ref 1.9–3.5)
GLUCOSE SERPL-MCNC: 103 MG/DL (ref 65–99)
HCT VFR BLD AUTO: 45.1 % (ref 42–52)
HGB BLD-MCNC: 14.3 G/DL (ref 14–18)
IMM GRANULOCYTES # BLD AUTO: 0.03 K/UL (ref 0–0.11)
IMM GRANULOCYTES NFR BLD AUTO: 0.3 % (ref 0–0.9)
LYMPHOCYTES # BLD AUTO: 3.29 K/UL (ref 1–4.8)
LYMPHOCYTES NFR BLD: 36.5 % (ref 22–41)
MCH RBC QN AUTO: 31.2 PG (ref 27–33)
MCHC RBC AUTO-ENTMCNC: 31.7 G/DL (ref 33.7–35.3)
MCV RBC AUTO: 98.5 FL (ref 81.4–97.8)
MONOCYTES # BLD AUTO: 0.93 K/UL (ref 0–0.85)
MONOCYTES NFR BLD AUTO: 10.3 % (ref 0–13.4)
NEUTROPHILS # BLD AUTO: 4.27 K/UL (ref 1.82–7.42)
NEUTROPHILS NFR BLD: 47.4 % (ref 44–72)
NRBC # BLD AUTO: 0 K/UL
NRBC BLD-RTO: 0 /100 WBC
PLATELET # BLD AUTO: 199 K/UL (ref 164–446)
PMV BLD AUTO: 12.1 FL (ref 9–12.9)
POTASSIUM SERPL-SCNC: 4.3 MMOL/L (ref 3.6–5.5)
PROT SERPL-MCNC: 7.2 G/DL (ref 6–8.2)
RBC # BLD AUTO: 4.58 M/UL (ref 4.7–6.1)
SODIUM SERPL-SCNC: 139 MMOL/L (ref 135–145)
WBC # BLD AUTO: 9 K/UL (ref 4.8–10.8)

## 2018-06-25 PROCEDURE — 85025 COMPLETE CBC W/AUTO DIFF WBC: CPT

## 2018-06-25 PROCEDURE — 36415 COLL VENOUS BLD VENIPUNCTURE: CPT

## 2018-06-25 PROCEDURE — 80053 COMPREHEN METABOLIC PANEL: CPT

## 2018-06-25 PROCEDURE — 71046 X-RAY EXAM CHEST 2 VIEWS: CPT

## 2018-07-10 ENCOUNTER — OFFICE VISIT (OUTPATIENT)
Dept: MEDICAL GROUP | Facility: MEDICAL CENTER | Age: 73
End: 2018-07-10
Payer: MEDICARE

## 2018-07-10 VITALS
SYSTOLIC BLOOD PRESSURE: 126 MMHG | OXYGEN SATURATION: 95 % | BODY MASS INDEX: 25.77 KG/M2 | HEIGHT: 70 IN | WEIGHT: 180 LBS | TEMPERATURE: 97.4 F | HEART RATE: 80 BPM | DIASTOLIC BLOOD PRESSURE: 72 MMHG

## 2018-07-10 DIAGNOSIS — H61.23 BILATERAL IMPACTED CERUMEN: ICD-10-CM

## 2018-07-10 DIAGNOSIS — J18.9 PNEUMONIA OF RIGHT UPPER LOBE DUE TO INFECTIOUS ORGANISM: ICD-10-CM

## 2018-07-10 DIAGNOSIS — J43.9 PULMONARY EMPHYSEMA, UNSPECIFIED EMPHYSEMA TYPE (HCC): ICD-10-CM

## 2018-07-10 PROCEDURE — 99213 OFFICE O/P EST LOW 20 MIN: CPT | Performed by: NURSE PRACTITIONER

## 2018-07-10 ASSESSMENT — ENCOUNTER SYMPTOMS: COUGH: 1

## 2018-07-10 NOTE — PROGRESS NOTES
Subjective:      Bayron Parikh is a 73 y.o. male who presents with Cough (Patient wants to make sure everything is cleared up) and Ear Fullness (Left ear fulness )        CC: Patient is here today mainly for bilateral cerumen impaction but also to discuss cough.    HPI Bayron Parikh      1. Pneumonia of right upper lobe due to infectious organism (HCC)  Patient was admitted to the hospital on May 26 because of pneumonia. His CT-CTA of the chest showed no evidence of pulmonary embolism but there was right lower lobe atelectasis or pneumonia and since he had elevated white blood cell count, he was treated for this.    Patient's last chest x-ray done on June 25 showed near complete resolution of right lung pneumonia but possible postinflammatory scarring in the right upper lobe with continued follow-up recommended. He had hyperinflation consistent with COPD. He states he feels much better although he still has cough throughout the day. He denies shortness of breath.    2. Bilateral impacted cerumen  Patient states he is prone to cerumen impaction and has noticed mainly in his left ear decreased hearing after using a Q-tip a few weeks ago. He denies sinus pain or pressure or headache. He denies discharge from the ears.  Social History   Substance Use Topics   • Smoking status: Former Smoker     Packs/day: 1.00     Years: 39.00     Types: Cigarettes     Quit date: 11/1/2000   • Smokeless tobacco: Never Used      Comment: started smoking at age 16   • Alcohol use No     Current Outpatient Prescriptions   Medication Sig Dispense Refill   • gabapentin (NEURONTIN) 300 MG Cap Take 300 mg by mouth 3 times a day.     • clonidine (CATAPRES) 0.1 MG Tab Take 0.1 mg by mouth 2 Times a Day.  2   • diazepam (VALIUM) 10 MG tablet Take 1 Tab by mouth every 6 hours as needed for Anxiety. 30 Tab 0   • lidocaine (XYLOCAINE) 4 % Solution Apply 1 Application to affected area(s) 2 times a day as needed (herpetic neuralgia). 1 Bottle 0   •  "morphine ER (MS CONTIN) 15 MG Tab CR tablet Take 1 Tab by mouth 3 times a day. (Patient not taking: Reported on 7/10/2018)     • oxycodone-acetaminophen (PERCOCET-10)  MG Tab Take 1-2 Tabs by mouth every 6 hours as needed for Severe Pain.       No current facility-administered medications for this visit.      Family History   Problem Relation Age of Onset   • Cancer Father      Esophogial/smoker   • No Known Problems Mother    • No Known Problems Brother    • No Known Problems Son    • No Known Problems Daughter    • No Known Problems Daughter    • No Known Problems Maternal Grandmother    • No Known Problems Maternal Grandfather    • No Known Problems Paternal Grandmother    • No Known Problems Paternal Grandfather      Past Medical History:   Diagnosis Date   • Asthma    • Shingles 03/01/2018   • Shingles 05/01/2018       Review of Systems   HENT: Positive for hearing loss.    Respiratory: Positive for cough.    All other systems reviewed and are negative.         Objective:     /72   Pulse 80   Temp 36.3 °C (97.4 °F)   Ht 1.778 m (5' 10\")   Wt 81.6 kg (180 lb)   SpO2 95%   BMI 25.83 kg/m²      Physical Exam   Constitutional: He is oriented to person, place, and time. He appears well-developed and well-nourished. No distress.   HENT:   Head: Normocephalic and atraumatic.   Right Ear: External ear normal. Decreased hearing is noted.   Left Ear: External ear normal. Decreased hearing is noted.   Nose: Nose normal.   Mouth/Throat: Oropharynx is clear and moist.   Bilateral cerumen impaction.   Eyes: Conjunctivae are normal. Right eye exhibits no discharge. Left eye exhibits no discharge.   Neck: Normal range of motion. Neck supple. No tracheal deviation present. No thyromegaly present.   Cardiovascular: Normal rate, regular rhythm and normal heart sounds.    No murmur heard.  Pulmonary/Chest: Effort normal. No respiratory distress. He has decreased breath sounds. He has no wheezes. He has no rales. "   Lymphadenopathy:     He has no cervical adenopathy.   Neurological: He is alert and oriented to person, place, and time. Coordination normal.   Skin: Skin is warm and dry. No rash noted. He is not diaphoretic. No erythema.   Psychiatric: He has a normal mood and affect. His behavior is normal. Judgment and thought content normal.   Nursing note and vitals reviewed.              Assessment/Plan:     1. Pneumonia of right upper lobe due to infectious organism (HCC)  I am not hearing good airflow in his lungs today although he is afebrile and his pulse ox is normal at 95%. I will do a chest x-ray today as recommended by radiology and if it continues to show concern, I will consider CAT scan of the lungs. I am also going to order a pulmonary function test. I would like to send him to pulmonology but it is a few months wait at this time to get in. With patient's previous history of smoking, I suspect he has COPD but a PFT will help us with this.  - DX-CHEST-2 VIEWS; Future    2. Bilateral impacted cerumen  Patient's ears were flushed bilaterally with minimal success. He was advised to try over-the-counter ear wax removal kits and I will also refer him to ENT. He was advised not to use Q-tips in the future.

## 2018-07-25 ENCOUNTER — HOSPITAL ENCOUNTER (OUTPATIENT)
Dept: RADIOLOGY | Facility: MEDICAL CENTER | Age: 73
End: 2018-07-25
Attending: NURSE PRACTITIONER
Payer: MEDICARE

## 2018-07-25 DIAGNOSIS — J18.9 PNEUMONIA OF RIGHT UPPER LOBE DUE TO INFECTIOUS ORGANISM: ICD-10-CM

## 2018-07-25 PROCEDURE — 71046 X-RAY EXAM CHEST 2 VIEWS: CPT

## 2018-07-30 ENCOUNTER — HOSPITAL ENCOUNTER (OUTPATIENT)
Dept: PULMONOLOGY | Facility: MEDICAL CENTER | Age: 73
End: 2018-07-30
Attending: NURSE PRACTITIONER
Payer: MEDICARE

## 2018-07-30 DIAGNOSIS — J43.9 PULMONARY EMPHYSEMA, UNSPECIFIED EMPHYSEMA TYPE (HCC): ICD-10-CM

## 2018-07-30 PROCEDURE — 94060 EVALUATION OF WHEEZING: CPT | Mod: 26 | Performed by: INTERNAL MEDICINE

## 2018-07-30 PROCEDURE — 94060 EVALUATION OF WHEEZING: CPT

## 2018-07-30 ASSESSMENT — PULMONARY FUNCTION TESTS
FEV1/FVC: 52.65
FEV1/FVC_PREDICTED: 75
FEV1: 1.84
FEV1_PERCENT_CHANGE: 8
FEV1_PERCENT_PREDICTED: 66
FVC: 3.78
FVC_LLN: 3.35
FEV1/FVC_PERCENT_PREDICTED: 70
FEV1/FVC: 53
FVC_PREDICTED: 4.01
FEV1/FVC_PERCENT_CHANGE: 0
FEV1/FVC_PERCENT_PREDICTED: 70
FEV1_PREDICTED: 3
FVC_PERCENT_PREDICTED: 94
FVC_PERCENT_PREDICTED: 87
FEV1/FVC: 52
FVC_LLN: 3.35
FVC: 3.51
FEV1_LLN: 2.51
FEV1/FVC_PERCENT_CHANGE: 114
FEV1_LLN: 2.51
FEV1: 1.99
FEV1_PERCENT_CHANGE: 7
FEV1/FVC_PERCENT_PREDICTED: 70
FEV1/FVC_PERCENT_LLN: 63
FEV1/FVC: 53
FEV1/FVC_PERCENT_PREDICTED: 75
FEV1/FVC_PERCENT_LLN: 63
FEV1/FVC_PERCENT_PREDICTED: 70
FEV1_PERCENT_PREDICTED: 61

## 2018-07-31 NOTE — PROCEDURES
SPIROMETRY INTERPRETATION    DATE OF STUDY:  07/30/2018    AGE:  73.    GENDER:  Male.    RACE:  .    HEIGHT:  177 cm.    WEIGHT:  180 pounds.    INDICATIONS:  Emphysema.    INTERPRETATION:  Spirometry is suggestive of moderate obstructive defect with   partial bronchodilator response.  The post-bronchodilator ratio of FEV1 to   forced vital capacity is decreased to 53%.  Pre-bronchodilator FEV1 is 1.84   liters, 61% of predicted and post-bronchodilator, it improved to 1.99 liters,   66% of predicted with 8% post-bronchodilator change.  Pre-bronchodilator   forced vital capacity is decreased to 3.51 liters, 87% predicted and   post-bronchodilator, it is 3.78 liters, 94% of predicted with 7% change.  The   mid expiratory flow rates FEF 25-75%, pre-bronchodilator is decreased to 0.64   liters per second, 29% of predicted and post-bronchodilator, it is 0.88 liters   per second, 40% of predicted with 36% post-bronchodilator change.  The lung   volumes show reduction in expiratory reserve volume to 0.30 liters, 38% of   predicted.    IMPRESSION:  Moderate obstructive defect with partial bronchodilator change   suggestive of COPD; however, for better evaluation.  Lung volumes and   diffusing capacity is also recommended.       ____________________________________     Muhammad K. Gondal, MD MKG / LADONNA    DD:  07/31/2018 07:04:37  DT:  07/31/2018 07:24:32    D#:  [id number]  Job#:  [id number]

## 2018-08-01 RX ORDER — ALBUTEROL SULFATE 90 UG/1
2 AEROSOL, METERED RESPIRATORY (INHALATION) EVERY 6 HOURS PRN
Qty: 8.5 G | Refills: 11 | Status: SHIPPED | OUTPATIENT
Start: 2018-08-01 | End: 2019-08-29 | Stop reason: SDUPTHER

## 2018-08-06 PROBLEM — J44.9 COPD MIXED TYPE (HCC): Status: ACTIVE | Noted: 2018-08-06

## 2018-08-15 ENCOUNTER — APPOINTMENT (RX ONLY)
Dept: URBAN - METROPOLITAN AREA CLINIC 4 | Facility: CLINIC | Age: 73
Setting detail: DERMATOLOGY
End: 2018-08-15

## 2018-08-15 DIAGNOSIS — L81.4 OTHER MELANIN HYPERPIGMENTATION: ICD-10-CM

## 2018-08-15 DIAGNOSIS — L21.8 OTHER SEBORRHEIC DERMATITIS: ICD-10-CM

## 2018-08-15 DIAGNOSIS — D18.0 HEMANGIOMA: ICD-10-CM

## 2018-08-15 DIAGNOSIS — L82.1 OTHER SEBORRHEIC KERATOSIS: ICD-10-CM

## 2018-08-15 DIAGNOSIS — Z71.89 OTHER SPECIFIED COUNSELING: ICD-10-CM

## 2018-08-15 DIAGNOSIS — D22 MELANOCYTIC NEVI: ICD-10-CM

## 2018-08-15 PROBLEM — D18.01 HEMANGIOMA OF SKIN AND SUBCUTANEOUS TISSUE: Status: ACTIVE | Noted: 2018-08-15

## 2018-08-15 PROBLEM — D22.62 MELANOCYTIC NEVI OF LEFT UPPER LIMB, INCLUDING SHOULDER: Status: ACTIVE | Noted: 2018-08-15

## 2018-08-15 PROBLEM — D22.61 MELANOCYTIC NEVI OF RIGHT UPPER LIMB, INCLUDING SHOULDER: Status: ACTIVE | Noted: 2018-08-15

## 2018-08-15 PROBLEM — D22.5 MELANOCYTIC NEVI OF TRUNK: Status: ACTIVE | Noted: 2018-08-15

## 2018-08-15 PROCEDURE — ? SUNSCREEN RECOMMENDATIONS

## 2018-08-15 PROCEDURE — ? COUNSELING

## 2018-08-15 PROCEDURE — ? PRESCRIPTION

## 2018-08-15 PROCEDURE — 99203 OFFICE O/P NEW LOW 30 MIN: CPT

## 2018-08-15 RX ORDER — FLUOCINONIDE 0.5 MG/ML
SOLUTION TOPICAL
Qty: 1 | Refills: 0 | Status: ERX | COMMUNITY
Start: 2018-08-15

## 2018-08-15 RX ORDER — KETOCONAZOLE 20.5 MG/ML
SHAMPOO, SUSPENSION TOPICAL BIW
Qty: 1 | Refills: 3 | Status: ERX | COMMUNITY
Start: 2018-08-15

## 2018-08-15 RX ADMIN — KETOCONAZOLE 1: 20.5 SHAMPOO, SUSPENSION TOPICAL at 00:00

## 2018-08-15 RX ADMIN — FLUOCINONIDE: 0.5 SOLUTION TOPICAL at 00:00

## 2018-08-15 ASSESSMENT — LOCATION SIMPLE DESCRIPTION DERM
LOCATION SIMPLE: UPPER BACK
LOCATION SIMPLE: RIGHT FOREHEAD
LOCATION SIMPLE: RIGHT FOREARM
LOCATION SIMPLE: LEFT HAND
LOCATION SIMPLE: RIGHT HAND
LOCATION SIMPLE: RIGHT UPPER BACK
LOCATION SIMPLE: POSTERIOR SCALP
LOCATION SIMPLE: LEFT FOREARM

## 2018-08-15 ASSESSMENT — LOCATION DETAILED DESCRIPTION DERM
LOCATION DETAILED: RIGHT VENTRAL DISTAL FOREARM
LOCATION DETAILED: RIGHT RADIAL DORSAL HAND
LOCATION DETAILED: RIGHT INFERIOR MEDIAL FOREHEAD
LOCATION DETAILED: RIGHT PROXIMAL DORSAL FOREARM
LOCATION DETAILED: POSTERIOR MID-PARIETAL SCALP
LOCATION DETAILED: INFERIOR THORACIC SPINE
LOCATION DETAILED: SUPERIOR THORACIC SPINE
LOCATION DETAILED: LEFT PROXIMAL DORSAL FOREARM
LOCATION DETAILED: RIGHT MEDIAL UPPER BACK
LOCATION DETAILED: LEFT VENTRAL PROXIMAL FOREARM
LOCATION DETAILED: RIGHT INFERIOR UPPER BACK
LOCATION DETAILED: LEFT RADIAL DORSAL HAND

## 2018-08-15 ASSESSMENT — LOCATION ZONE DERM
LOCATION ZONE: ARM
LOCATION ZONE: HAND
LOCATION ZONE: TRUNK
LOCATION ZONE: FACE
LOCATION ZONE: SCALP

## 2018-08-15 NOTE — HPI: RASH (SEBORRHEIC DERMATITIS)
How Severe Is It?: moderate
Is This A New Presentation, Or A Follow-Up?: Follow Up Seborrheic Dermatitis
Additional History: Patient needs refills of both medications. States they still show improvement.

## 2018-10-03 ENCOUNTER — NON-PROVIDER VISIT (OUTPATIENT)
Dept: MEDICAL GROUP | Facility: MEDICAL CENTER | Age: 73
End: 2018-10-03
Payer: MEDICARE

## 2018-10-03 ENCOUNTER — TELEPHONE (OUTPATIENT)
Dept: MEDICAL GROUP | Facility: MEDICAL CENTER | Age: 73
End: 2018-10-03

## 2018-10-03 DIAGNOSIS — Z23 NEED FOR VACCINATION: ICD-10-CM

## 2018-10-03 DIAGNOSIS — Z23 INFLUENZA VACCINE NEEDED: ICD-10-CM

## 2018-10-03 PROCEDURE — G0008 ADMIN INFLUENZA VIRUS VAC: HCPCS | Performed by: NURSE PRACTITIONER

## 2018-10-03 PROCEDURE — 90662 IIV NO PRSV INCREASED AG IM: CPT | Performed by: NURSE PRACTITIONER

## 2018-10-03 NOTE — TELEPHONE ENCOUNTER
Patient is on the MA Schedule today for FLU vaccine/injection.    SPECIFIC Action To Be Taken: Orders pending, please sign.

## 2018-10-03 NOTE — NON-PROVIDER
"Bayron Parikh is a 73 y.o. male here for a non-provider visit for:   FLU    Reason for immunization: Annual Flu Vaccine  Immunization records indicate need for vaccine: Yes, confirmed with [facility]  Minimum interval has been met for this vaccine: Yes  ABN completed: Yes    Order and dose verified by:Agl  VIS  was given to patient: Yes  All IAC Questionnaire questions were answered \"No.\"    Patient tolerated injection and no adverse effects were observed or reported: Yes    Pt scheduled for next dose in series: NO    "

## 2018-10-08 ENCOUNTER — OFFICE VISIT (OUTPATIENT)
Dept: MEDICAL GROUP | Facility: MEDICAL CENTER | Age: 73
End: 2018-10-08
Payer: MEDICARE

## 2018-10-08 VITALS
BODY MASS INDEX: 25.2 KG/M2 | WEIGHT: 176 LBS | RESPIRATION RATE: 16 BRPM | SYSTOLIC BLOOD PRESSURE: 142 MMHG | HEART RATE: 76 BPM | OXYGEN SATURATION: 92 % | TEMPERATURE: 98.4 F | HEIGHT: 70 IN | DIASTOLIC BLOOD PRESSURE: 70 MMHG

## 2018-10-08 DIAGNOSIS — K12.2 ORAL INFECTION: ICD-10-CM

## 2018-10-08 DIAGNOSIS — M54.42 CHRONIC BILATERAL LOW BACK PAIN WITH LEFT-SIDED SCIATICA: ICD-10-CM

## 2018-10-08 DIAGNOSIS — G89.29 CHRONIC BILATERAL LOW BACK PAIN WITH LEFT-SIDED SCIATICA: ICD-10-CM

## 2018-10-08 DIAGNOSIS — J44.9 COPD MIXED TYPE (HCC): ICD-10-CM

## 2018-10-08 PROBLEM — J18.9 PNEUMONIA: Status: RESOLVED | Noted: 2018-05-23 | Resolved: 2018-10-08

## 2018-10-08 PROCEDURE — 99214 OFFICE O/P EST MOD 30 MIN: CPT | Performed by: NURSE PRACTITIONER

## 2018-10-08 RX ORDER — TIOTROPIUM BROMIDE 18 UG/1
18 CAPSULE ORAL; RESPIRATORY (INHALATION) DAILY
Qty: 30 CAP | Refills: 3 | Status: SHIPPED | OUTPATIENT
Start: 2018-10-08 | End: 2019-04-08 | Stop reason: SDUPTHER

## 2018-10-08 RX ORDER — CLINDAMYCIN HYDROCHLORIDE 300 MG/1
300 CAPSULE ORAL 3 TIMES DAILY
Qty: 30 CAP | Refills: 0 | Status: SHIPPED | OUTPATIENT
Start: 2018-10-08 | End: 2018-10-18

## 2018-10-08 ASSESSMENT — ENCOUNTER SYMPTOMS: SHORTNESS OF BREATH: 1

## 2018-10-08 NOTE — PROGRESS NOTES
Subjective:      Bayron Parikh is a 73 y.o. male who presents with Other (tender jaw)        CC: Patient here today for dental concerns as well as for his COPD.    HPI Bayron Parikh      1. COPD mixed type (HCC)  Patient had a pulmonary function test done in July that showed moderate obstructive defect with some improvement with albuterol.  He states he has found the albuterol very helpful and is using it twice a day.  He is not on a preventative inhaler.  Patient's main concern today is    2. Oral infection  He thinks he might have an oral infection.  He wears a lower plate and feels it might be rubbing up against his gum area.  He developed some swelling and tenderness of the left lower jaw which improved slightly but still bothersome.  He states he has no money to see a dentist.  He denies fever or chills.  He denies problems with swallowing.    3. Chronic bilateral low back pain with left-sided sciatica  Patient reports he continues to follow with pain management which is prescribing his opiates.  He has chronic back pain and feels the need to use these medicines on a regular basis.  He also is getting his Valium through pain management.    Current Outpatient Prescriptions   Medication Sig Dispense Refill   • tiotropium (SPIRIVA) 18 MCG Cap Inhale 1 Cap by mouth every day. 30 Cap 3   • clindamycin (CLEOCIN) 300 MG Cap Take 1 Cap by mouth 3 times a day for 10 days. 30 Cap 0   • albuterol 108 (90 Base) MCG/ACT Aero Soln inhalation aerosol Inhale 2 Puffs by mouth every 6 hours as needed. 8.5 g 11   • gabapentin (NEURONTIN) 300 MG Cap Take 300 mg by mouth 3 times a day.     • clonidine (CATAPRES) 0.1 MG Tab Take 0.1 mg by mouth 2 Times a Day.  2   • diazepam (VALIUM) 10 MG tablet Take 1 Tab by mouth every 6 hours as needed for Anxiety. 30 Tab 0   • morphine ER (MS CONTIN) 15 MG Tab CR tablet Take 1 Tab by mouth 3 times a day.     • oxycodone-acetaminophen (PERCOCET-10)  MG Tab Take 1-2 Tabs by mouth  "every 6 hours as needed for Severe Pain.     • lidocaine (XYLOCAINE) 4 % Solution Apply 1 Application to affected area(s) 2 times a day as needed (herpetic neuralgia). 1 Bottle 0     No current facility-administered medications for this visit.      Social History   Substance Use Topics   • Smoking status: Former Smoker     Packs/day: 1.00     Years: 39.00     Types: Cigarettes     Quit date: 11/1/2000   • Smokeless tobacco: Never Used      Comment: started smoking at age 16   • Alcohol use No     Family History   Problem Relation Age of Onset   • Cancer Father         Esophogial/smoker   • No Known Problems Mother    • No Known Problems Brother    • No Known Problems Son    • No Known Problems Daughter    • No Known Problems Daughter    • No Known Problems Maternal Grandmother    • No Known Problems Maternal Grandfather    • No Known Problems Paternal Grandmother    • No Known Problems Paternal Grandfather      Past Medical History:   Diagnosis Date   • Asthma    • Shingles 03/01/2018   • Shingles 05/01/2018       Review of Systems   Respiratory: Positive for shortness of breath.    All other systems reviewed and are negative.         Objective:     /70 (BP Location: Right arm, Patient Position: Sitting, BP Cuff Size: Adult)   Pulse 76   Temp 36.9 °C (98.4 °F) (Temporal)   Resp 16   Ht 1.778 m (5' 10\")   Wt 79.8 kg (176 lb)   SpO2 92%   BMI 25.25 kg/m²      Physical Exam   Constitutional: He is oriented to person, place, and time. He appears well-developed and well-nourished. No distress.   HENT:   Head: Normocephalic and atraumatic.   Right Ear: External ear normal.   Left Ear: External ear normal.   Nose: Nose normal.   Mouth/Throat: Oropharynx is clear and moist.   No obvious swelling noted along the left side of the jaw where patient reports that have been swollen earlier.  No oral abscess noted although there is some irritation along the left portion of his lower mouth where his plate rubs up " against the skin.   Eyes: Conjunctivae are normal. Right eye exhibits no discharge. Left eye exhibits no discharge.   Neck: Normal range of motion. Neck supple. No tracheal deviation present. No thyromegaly present.   Cardiovascular: Normal rate, regular rhythm and normal heart sounds.    No murmur heard.  Pulmonary/Chest: Effort normal and breath sounds normal. No respiratory distress. He has no wheezes. He has no rales.   Lymphadenopathy:     He has no cervical adenopathy.   Neurological: He is alert and oriented to person, place, and time. Coordination normal.   Skin: Skin is warm and dry. No rash noted. He is not diaphoretic. No erythema.   Psychiatric: He has a normal mood and affect. His behavior is normal. Judgment and thought content normal.   Nursing note and vitals reviewed.         IMPRESSION:  Moderate obstructive defect with partial bronchodilator change   suggestive of COPD; however, for better evaluation.  Lung volumes and   diffusing capacity is also recommended.        ____________________________________     Muhammad K. Gondal, MD MKG / LADONNA     DD:  07/31/2018 07:04:37     Assessment/Plan:     1. COPD mixed type (HCC)  Patient advised that since he is using albuterol more than twice a week, he is not well controlled and should be on a preventative inhaler as well.  I will start him on Spiriva with instructions in usage.  I advised him that I would like to see him not need to use his albuterol more than twice a week or wake up short of breath at night.  - tiotropium (SPIRIVA) 18 MCG Cap; Inhale 1 Cap by mouth every day.  Dispense: 30 Cap; Refill: 3    2. Oral infection  Patient strongly encouraged to see his dentist and to start saving up money for this.  I will start him on clindamycin which she will take for 7-10 days unless he develops strong diarrhea.  - clindamycin (CLEOCIN) 300 MG Cap; Take 1 Cap by mouth 3 times a day for 10 days.  Dispense: 30 Cap; Refill: 0    3. Chronic bilateral low  back pain with left-sided sciatica  Patient will continue to follow with pain management which is prescribing his controlled substances.

## 2018-10-28 ENCOUNTER — HOSPITAL ENCOUNTER (OUTPATIENT)
Dept: RADIOLOGY | Facility: MEDICAL CENTER | Age: 73
End: 2018-10-28
Attending: ANESTHESIOLOGY
Payer: MEDICARE

## 2018-10-28 DIAGNOSIS — M54.16 LUMBAR RADICULOPATHY: ICD-10-CM

## 2018-10-28 PROCEDURE — 72148 MRI LUMBAR SPINE W/O DYE: CPT

## 2019-01-10 ENCOUNTER — HOSPITAL ENCOUNTER (OUTPATIENT)
Dept: RADIOLOGY | Facility: MEDICAL CENTER | Age: 74
End: 2019-01-10
Attending: NEUROLOGICAL SURGERY
Payer: MEDICARE

## 2019-01-10 DIAGNOSIS — M47.26 OTHER SPONDYLOSIS WITH RADICULOPATHY, LUMBAR REGION: ICD-10-CM

## 2019-01-10 PROCEDURE — 72110 X-RAY EXAM L-2 SPINE 4/>VWS: CPT

## 2019-01-25 NOTE — ED NOTES
Chief Complaint   Patient presents with   • Chest Pain   • Cough   • Shortness of Breath     Pt wheeled to ekg room, c/o chest pain x1 month. Pt said he thought it was his shingles. Noted cough, pt hypoxic 84%, [id number] nc.   Left sided chest pain radiating to arm.   Sepsis score 3, informed charge rn   Anemia, unspecified type

## 2019-02-05 ENCOUNTER — HOSPITAL ENCOUNTER (OUTPATIENT)
Facility: MEDICAL CENTER | Age: 74
End: 2019-02-05
Attending: NEUROLOGICAL SURGERY | Admitting: NEUROLOGICAL SURGERY
Payer: MEDICARE

## 2019-02-13 ENCOUNTER — HOSPITAL ENCOUNTER (OUTPATIENT)
Dept: RADIOLOGY | Facility: MEDICAL CENTER | Age: 74
End: 2019-02-13
Attending: NEUROLOGICAL SURGERY | Admitting: NEUROLOGICAL SURGERY
Payer: MEDICARE

## 2019-02-13 VITALS — HEIGHT: 69 IN | WEIGHT: 184.75 LBS | BODY MASS INDEX: 27.36 KG/M2

## 2019-02-13 DIAGNOSIS — Z01.812 PRE-OPERATIVE LABORATORY EXAMINATION: ICD-10-CM

## 2019-02-13 DIAGNOSIS — Z01.810 PRE-OPERATIVE CARDIOVASCULAR EXAMINATION: ICD-10-CM

## 2019-02-13 DIAGNOSIS — Z01.811 PRE-OPERATIVE RESPIRATORY EXAMINATION: ICD-10-CM

## 2019-02-13 LAB
ANION GAP SERPL CALC-SCNC: 7 MMOL/L (ref 0–11.9)
APTT PPP: 30.4 SEC (ref 24.7–36)
BASOPHILS # BLD AUTO: 1.2 % (ref 0–1.8)
BASOPHILS # BLD: 0.11 K/UL (ref 0–0.12)
BUN SERPL-MCNC: 19 MG/DL (ref 8–22)
CALCIUM SERPL-MCNC: 9.4 MG/DL (ref 8.5–10.5)
CHLORIDE SERPL-SCNC: 106 MMOL/L (ref 96–112)
CO2 SERPL-SCNC: 25 MMOL/L (ref 20–33)
CREAT SERPL-MCNC: 0.98 MG/DL (ref 0.5–1.4)
EKG IMPRESSION: NORMAL
EOSINOPHIL # BLD AUTO: 0.21 K/UL (ref 0–0.51)
EOSINOPHIL NFR BLD: 2.4 % (ref 0–6.9)
ERYTHROCYTE [DISTWIDTH] IN BLOOD BY AUTOMATED COUNT: 48.3 FL (ref 35.9–50)
GLUCOSE SERPL-MCNC: 103 MG/DL (ref 65–99)
HCT VFR BLD AUTO: 46.8 % (ref 42–52)
HGB BLD-MCNC: 15.5 G/DL (ref 14–18)
IMM GRANULOCYTES # BLD AUTO: 0.03 K/UL (ref 0–0.11)
IMM GRANULOCYTES NFR BLD AUTO: 0.3 % (ref 0–0.9)
INR PPP: 1 (ref 0.87–1.13)
LYMPHOCYTES # BLD AUTO: 2.41 K/UL (ref 1–4.8)
LYMPHOCYTES NFR BLD: 27.2 % (ref 22–41)
MCH RBC QN AUTO: 31.8 PG (ref 27–33)
MCHC RBC AUTO-ENTMCNC: 33.1 G/DL (ref 33.7–35.3)
MCV RBC AUTO: 96.1 FL (ref 81.4–97.8)
MONOCYTES # BLD AUTO: 0.85 K/UL (ref 0–0.85)
MONOCYTES NFR BLD AUTO: 9.6 % (ref 0–13.4)
NEUTROPHILS # BLD AUTO: 5.26 K/UL (ref 1.82–7.42)
NEUTROPHILS NFR BLD: 59.3 % (ref 44–72)
NRBC # BLD AUTO: 0 K/UL
NRBC BLD-RTO: 0 /100 WBC
PLATELET # BLD AUTO: 253 K/UL (ref 164–446)
PMV BLD AUTO: 10.8 FL (ref 9–12.9)
POTASSIUM SERPL-SCNC: 4.7 MMOL/L (ref 3.6–5.5)
PROTHROMBIN TIME: 13.3 SEC (ref 12–14.6)
RBC # BLD AUTO: 4.87 M/UL (ref 4.7–6.1)
SODIUM SERPL-SCNC: 138 MMOL/L (ref 135–145)
WBC # BLD AUTO: 8.9 K/UL (ref 4.8–10.8)

## 2019-02-13 PROCEDURE — 80048 BASIC METABOLIC PNL TOTAL CA: CPT

## 2019-02-13 PROCEDURE — 93010 ELECTROCARDIOGRAM REPORT: CPT | Performed by: INTERNAL MEDICINE

## 2019-02-13 PROCEDURE — 36415 COLL VENOUS BLD VENIPUNCTURE: CPT

## 2019-02-13 PROCEDURE — 85025 COMPLETE CBC W/AUTO DIFF WBC: CPT

## 2019-02-13 PROCEDURE — 71046 X-RAY EXAM CHEST 2 VIEWS: CPT

## 2019-02-13 PROCEDURE — 93005 ELECTROCARDIOGRAM TRACING: CPT

## 2019-02-13 PROCEDURE — 85610 PROTHROMBIN TIME: CPT

## 2019-02-13 PROCEDURE — 85730 THROMBOPLASTIN TIME PARTIAL: CPT

## 2019-03-14 ENCOUNTER — APPOINTMENT (OUTPATIENT)
Dept: RADIOLOGY | Facility: MEDICAL CENTER | Age: 74
End: 2019-03-14
Attending: NEUROLOGICAL SURGERY
Payer: MEDICARE

## 2019-03-14 ENCOUNTER — ANESTHESIA EVENT (OUTPATIENT)
Dept: SURGERY | Facility: MEDICAL CENTER | Age: 74
End: 2019-03-14
Payer: MEDICARE

## 2019-03-14 ENCOUNTER — HOSPITAL ENCOUNTER (OUTPATIENT)
Facility: MEDICAL CENTER | Age: 74
End: 2019-03-16
Attending: NEUROLOGICAL SURGERY | Admitting: NEUROLOGICAL SURGERY
Payer: MEDICARE

## 2019-03-14 ENCOUNTER — ANESTHESIA (OUTPATIENT)
Dept: SURGERY | Facility: MEDICAL CENTER | Age: 74
End: 2019-03-14
Payer: MEDICARE

## 2019-03-14 DIAGNOSIS — G89.18 POST-OP PAIN: ICD-10-CM

## 2019-03-14 DIAGNOSIS — M54.42 CHRONIC BILATERAL LOW BACK PAIN WITH LEFT-SIDED SCIATICA: ICD-10-CM

## 2019-03-14 DIAGNOSIS — G89.29 CHRONIC BILATERAL LOW BACK PAIN WITH LEFT-SIDED SCIATICA: ICD-10-CM

## 2019-03-14 LAB
ABO GROUP BLD: NORMAL
BLD GP AB SCN SERPL QL: NORMAL
RH BLD: NORMAL

## 2019-03-14 PROCEDURE — 86850 RBC ANTIBODY SCREEN: CPT

## 2019-03-14 PROCEDURE — 96375 TX/PRO/DX INJ NEW DRUG ADDON: CPT

## 2019-03-14 PROCEDURE — 96365 THER/PROPH/DIAG IV INF INIT: CPT | Mod: XU

## 2019-03-14 PROCEDURE — 160041 HCHG SURGERY MINUTES - EA ADDL 1 MIN LEVEL 4: Performed by: NEUROLOGICAL SURGERY

## 2019-03-14 PROCEDURE — 86901 BLOOD TYPING SEROLOGIC RH(D): CPT

## 2019-03-14 PROCEDURE — G0378 HOSPITAL OBSERVATION PER HR: HCPCS

## 2019-03-14 PROCEDURE — 86900 BLOOD TYPING SEROLOGIC ABO: CPT

## 2019-03-14 PROCEDURE — 700111 HCHG RX REV CODE 636 W/ 250 OVERRIDE (IP): Performed by: ANESTHESIOLOGY

## 2019-03-14 PROCEDURE — 700101 HCHG RX REV CODE 250: Performed by: ANESTHESIOLOGY

## 2019-03-14 PROCEDURE — A9270 NON-COVERED ITEM OR SERVICE: HCPCS | Performed by: ANESTHESIOLOGY

## 2019-03-14 PROCEDURE — 500885 HCHG PACK, JACKSON TABLE: Performed by: NEUROLOGICAL SURGERY

## 2019-03-14 PROCEDURE — 700102 HCHG RX REV CODE 250 W/ 637 OVERRIDE(OP): Performed by: PHYSICIAN ASSISTANT

## 2019-03-14 PROCEDURE — 160035 HCHG PACU - 1ST 60 MINS PHASE I: Performed by: NEUROLOGICAL SURGERY

## 2019-03-14 PROCEDURE — 500331 HCHG COTTONOID, SURG PATTIE: Performed by: NEUROLOGICAL SURGERY

## 2019-03-14 PROCEDURE — 96366 THER/PROPH/DIAG IV INF ADDON: CPT

## 2019-03-14 PROCEDURE — 700105 HCHG RX REV CODE 258: Performed by: ANESTHESIOLOGY

## 2019-03-14 PROCEDURE — 96376 TX/PRO/DX INJ SAME DRUG ADON: CPT

## 2019-03-14 PROCEDURE — 160029 HCHG SURGERY MINUTES - 1ST 30 MINS LEVEL 4: Performed by: NEUROLOGICAL SURGERY

## 2019-03-14 PROCEDURE — 700111 HCHG RX REV CODE 636 W/ 250 OVERRIDE (IP): Performed by: PHYSICIAN ASSISTANT

## 2019-03-14 PROCEDURE — 502240 HCHG MISC OR SUPPLY RC 0272: Performed by: NEUROLOGICAL SURGERY

## 2019-03-14 PROCEDURE — A9270 NON-COVERED ITEM OR SERVICE: HCPCS | Performed by: PHYSICIAN ASSISTANT

## 2019-03-14 PROCEDURE — 160009 HCHG ANES TIME/MIN: Performed by: NEUROLOGICAL SURGERY

## 2019-03-14 PROCEDURE — 500367 HCHG DRAIN KIT, HEMOVAC: Performed by: NEUROLOGICAL SURGERY

## 2019-03-14 PROCEDURE — 700101 HCHG RX REV CODE 250

## 2019-03-14 PROCEDURE — 72020 X-RAY EXAM OF SPINE 1 VIEW: CPT

## 2019-03-14 PROCEDURE — 700102 HCHG RX REV CODE 250 W/ 637 OVERRIDE(OP): Performed by: ANESTHESIOLOGY

## 2019-03-14 PROCEDURE — 700112 HCHG RX REV CODE 229: Performed by: PHYSICIAN ASSISTANT

## 2019-03-14 PROCEDURE — 501838 HCHG SUTURE GENERAL: Performed by: NEUROLOGICAL SURGERY

## 2019-03-14 PROCEDURE — 700111 HCHG RX REV CODE 636 W/ 250 OVERRIDE (IP)

## 2019-03-14 PROCEDURE — 160036 HCHG PACU - EA ADDL 30 MINS PHASE I: Performed by: NEUROLOGICAL SURGERY

## 2019-03-14 PROCEDURE — 700101 HCHG RX REV CODE 250: Performed by: PHYSICIAN ASSISTANT

## 2019-03-14 PROCEDURE — 160048 HCHG OR STATISTICAL LEVEL 1-5: Performed by: NEUROLOGICAL SURGERY

## 2019-03-14 PROCEDURE — 160002 HCHG RECOVERY MINUTES (STAT): Performed by: NEUROLOGICAL SURGERY

## 2019-03-14 PROCEDURE — 110454 HCHG SHELL REV 250: Performed by: NEUROLOGICAL SURGERY

## 2019-03-14 RX ORDER — CALCIUM CARBONATE 500 MG/1
500 TABLET, CHEWABLE ORAL 2 TIMES DAILY
Status: DISCONTINUED | OUTPATIENT
Start: 2019-03-14 | End: 2019-03-16 | Stop reason: HOSPADM

## 2019-03-14 RX ORDER — OXYCODONE HYDROCHLORIDE 10 MG/1
10 TABLET ORAL
Status: DISCONTINUED | OUTPATIENT
Start: 2019-03-14 | End: 2019-03-15

## 2019-03-14 RX ORDER — ATORVASTATIN CALCIUM 40 MG/1
40 TABLET, FILM COATED ORAL NIGHTLY
COMMUNITY
End: 2019-08-29 | Stop reason: SDUPTHER

## 2019-03-14 RX ORDER — MELOXICAM 7.5 MG/1
7.5 TABLET ORAL
Status: DISCONTINUED | OUTPATIENT
Start: 2019-03-15 | End: 2019-03-16 | Stop reason: HOSPADM

## 2019-03-14 RX ORDER — OXYCODONE HCL 5 MG/5 ML
5 SOLUTION, ORAL ORAL
Status: COMPLETED | OUTPATIENT
Start: 2019-03-14 | End: 2019-03-14

## 2019-03-14 RX ORDER — DIPHENHYDRAMINE HYDROCHLORIDE 50 MG/ML
12.5 INJECTION INTRAMUSCULAR; INTRAVENOUS
Status: DISCONTINUED | OUTPATIENT
Start: 2019-03-14 | End: 2019-03-14 | Stop reason: HOSPADM

## 2019-03-14 RX ORDER — DIAZEPAM 5 MG/1
5 TABLET ORAL EVERY 4 HOURS PRN
Status: DISCONTINUED | OUTPATIENT
Start: 2019-03-14 | End: 2019-03-16 | Stop reason: HOSPADM

## 2019-03-14 RX ORDER — CLONIDINE HYDROCHLORIDE 0.1 MG/1
0.1 TABLET ORAL EVERY 4 HOURS PRN
Status: DISCONTINUED | OUTPATIENT
Start: 2019-03-14 | End: 2019-03-16 | Stop reason: HOSPADM

## 2019-03-14 RX ORDER — ATORVASTATIN CALCIUM 20 MG/1
40 TABLET, FILM COATED ORAL NIGHTLY
Status: DISCONTINUED | OUTPATIENT
Start: 2019-03-14 | End: 2019-03-16 | Stop reason: HOSPADM

## 2019-03-14 RX ORDER — ACETAMINOPHEN 500 MG
1000 TABLET ORAL EVERY 6 HOURS
Status: DISCONTINUED | OUTPATIENT
Start: 2019-03-14 | End: 2019-03-15

## 2019-03-14 RX ORDER — POLYETHYLENE GLYCOL 3350 17 G/17G
1 POWDER, FOR SOLUTION ORAL 2 TIMES DAILY PRN
Status: DISCONTINUED | OUTPATIENT
Start: 2019-03-14 | End: 2019-03-16 | Stop reason: HOSPADM

## 2019-03-14 RX ORDER — TIOTROPIUM BROMIDE 18 UG/1
1 CAPSULE ORAL; RESPIRATORY (INHALATION) DAILY
Status: DISCONTINUED | OUTPATIENT
Start: 2019-03-15 | End: 2019-03-16 | Stop reason: HOSPADM

## 2019-03-14 RX ORDER — KETOCONAZOLE 20 MG/ML
1 SHAMPOO TOPICAL
Status: ON HOLD | COMMUNITY
End: 2019-10-10

## 2019-03-14 RX ORDER — ONDANSETRON 2 MG/ML
4 INJECTION INTRAMUSCULAR; INTRAVENOUS EVERY 4 HOURS PRN
Status: DISCONTINUED | OUTPATIENT
Start: 2019-03-14 | End: 2019-03-16 | Stop reason: HOSPADM

## 2019-03-14 RX ORDER — SODIUM CHLORIDE AND POTASSIUM CHLORIDE 150; 900 MG/100ML; MG/100ML
INJECTION, SOLUTION INTRAVENOUS CONTINUOUS
Status: DISCONTINUED | OUTPATIENT
Start: 2019-03-14 | End: 2019-03-16 | Stop reason: HOSPADM

## 2019-03-14 RX ORDER — CEFAZOLIN SODIUM 2 G/100ML
2 INJECTION, SOLUTION INTRAVENOUS EVERY 8 HOURS
Status: COMPLETED | OUTPATIENT
Start: 2019-03-14 | End: 2019-03-14

## 2019-03-14 RX ORDER — ENALAPRILAT 1.25 MG/ML
1.25 INJECTION INTRAVENOUS EVERY 6 HOURS PRN
Status: DISCONTINUED | OUTPATIENT
Start: 2019-03-14 | End: 2019-03-16 | Stop reason: HOSPADM

## 2019-03-14 RX ORDER — HYDRALAZINE HYDROCHLORIDE 20 MG/ML
5 INJECTION INTRAMUSCULAR; INTRAVENOUS
Status: DISCONTINUED | OUTPATIENT
Start: 2019-03-14 | End: 2019-03-14 | Stop reason: HOSPADM

## 2019-03-14 RX ORDER — SODIUM CHLORIDE, SODIUM LACTATE, POTASSIUM CHLORIDE, CALCIUM CHLORIDE 600; 310; 30; 20 MG/100ML; MG/100ML; MG/100ML; MG/100ML
INJECTION, SOLUTION INTRAVENOUS ONCE
Status: COMPLETED | OUTPATIENT
Start: 2019-03-14 | End: 2019-03-14

## 2019-03-14 RX ORDER — ONDANSETRON 2 MG/ML
INJECTION INTRAMUSCULAR; INTRAVENOUS PRN
Status: DISCONTINUED | OUTPATIENT
Start: 2019-03-14 | End: 2019-03-14 | Stop reason: SURG

## 2019-03-14 RX ORDER — MEPERIDINE HYDROCHLORIDE 25 MG/ML
12.5 INJECTION INTRAMUSCULAR; INTRAVENOUS; SUBCUTANEOUS
Status: DISCONTINUED | OUTPATIENT
Start: 2019-03-14 | End: 2019-03-14 | Stop reason: HOSPADM

## 2019-03-14 RX ORDER — BACITRACIN 50000 [IU]/1
INJECTION, POWDER, FOR SOLUTION INTRAMUSCULAR
Status: DISCONTINUED | OUTPATIENT
Start: 2019-03-14 | End: 2019-03-14 | Stop reason: HOSPADM

## 2019-03-14 RX ORDER — ONDANSETRON 2 MG/ML
4 INJECTION INTRAMUSCULAR; INTRAVENOUS
Status: DISCONTINUED | OUTPATIENT
Start: 2019-03-14 | End: 2019-03-14 | Stop reason: HOSPADM

## 2019-03-14 RX ORDER — METHOCARBAMOL 750 MG/1
750 TABLET, FILM COATED ORAL EVERY 8 HOURS PRN
Status: DISCONTINUED | OUTPATIENT
Start: 2019-03-14 | End: 2019-03-16 | Stop reason: HOSPADM

## 2019-03-14 RX ORDER — OXYCODONE HCL 5 MG/5 ML
10 SOLUTION, ORAL ORAL
Status: COMPLETED | OUTPATIENT
Start: 2019-03-14 | End: 2019-03-14

## 2019-03-14 RX ORDER — CYCLOBENZAPRINE HCL 10 MG
10 TABLET ORAL EVERY 8 HOURS PRN
Status: DISCONTINUED | OUTPATIENT
Start: 2019-03-14 | End: 2019-03-16 | Stop reason: HOSPADM

## 2019-03-14 RX ORDER — GABAPENTIN 300 MG/1
300 CAPSULE ORAL 3 TIMES DAILY
Status: DISCONTINUED | OUTPATIENT
Start: 2019-03-14 | End: 2019-03-16 | Stop reason: HOSPADM

## 2019-03-14 RX ORDER — HYDROMORPHONE HYDROCHLORIDE 1 MG/ML
0.1 INJECTION, SOLUTION INTRAMUSCULAR; INTRAVENOUS; SUBCUTANEOUS
Status: DISCONTINUED | OUTPATIENT
Start: 2019-03-14 | End: 2019-03-14 | Stop reason: HOSPADM

## 2019-03-14 RX ORDER — ALBUTEROL SULFATE 90 UG/1
2 AEROSOL, METERED RESPIRATORY (INHALATION) EVERY 6 HOURS PRN
Status: DISCONTINUED | OUTPATIENT
Start: 2019-03-14 | End: 2019-03-16 | Stop reason: HOSPADM

## 2019-03-14 RX ORDER — MIDAZOLAM HYDROCHLORIDE 1 MG/ML
1 INJECTION INTRAMUSCULAR; INTRAVENOUS
Status: DISCONTINUED | OUTPATIENT
Start: 2019-03-14 | End: 2019-03-14 | Stop reason: HOSPADM

## 2019-03-14 RX ORDER — AMOXICILLIN 250 MG
1 CAPSULE ORAL
Status: DISCONTINUED | OUTPATIENT
Start: 2019-03-14 | End: 2019-03-16 | Stop reason: HOSPADM

## 2019-03-14 RX ORDER — CEFAZOLIN SODIUM 1 G/3ML
INJECTION, POWDER, FOR SOLUTION INTRAMUSCULAR; INTRAVENOUS PRN
Status: DISCONTINUED | OUTPATIENT
Start: 2019-03-14 | End: 2019-03-14 | Stop reason: SURG

## 2019-03-14 RX ORDER — BISACODYL 10 MG
10 SUPPOSITORY, RECTAL RECTAL
Status: DISCONTINUED | OUTPATIENT
Start: 2019-03-14 | End: 2019-03-16 | Stop reason: HOSPADM

## 2019-03-14 RX ORDER — SODIUM CHLORIDE, SODIUM LACTATE, POTASSIUM CHLORIDE, CALCIUM CHLORIDE 600; 310; 30; 20 MG/100ML; MG/100ML; MG/100ML; MG/100ML
INJECTION, SOLUTION INTRAVENOUS
Status: DISCONTINUED | OUTPATIENT
Start: 2019-03-14 | End: 2019-03-14 | Stop reason: SURG

## 2019-03-14 RX ORDER — DIPHENHYDRAMINE HYDROCHLORIDE 50 MG/ML
25 INJECTION INTRAMUSCULAR; INTRAVENOUS EVERY 6 HOURS PRN
Status: DISCONTINUED | OUTPATIENT
Start: 2019-03-14 | End: 2019-03-16 | Stop reason: HOSPADM

## 2019-03-14 RX ORDER — HALOPERIDOL 5 MG/ML
1 INJECTION INTRAMUSCULAR
Status: DISCONTINUED | OUTPATIENT
Start: 2019-03-14 | End: 2019-03-14 | Stop reason: HOSPADM

## 2019-03-14 RX ORDER — HYDROMORPHONE HYDROCHLORIDE 1 MG/ML
0.2 INJECTION, SOLUTION INTRAMUSCULAR; INTRAVENOUS; SUBCUTANEOUS
Status: DISCONTINUED | OUTPATIENT
Start: 2019-03-14 | End: 2019-03-14 | Stop reason: HOSPADM

## 2019-03-14 RX ORDER — ENEMA 19; 7 G/133ML; G/133ML
1 ENEMA RECTAL
Status: COMPLETED | OUTPATIENT
Start: 2019-03-14 | End: 2019-03-16

## 2019-03-14 RX ORDER — HYDROMORPHONE HYDROCHLORIDE 1 MG/ML
1 INJECTION, SOLUTION INTRAMUSCULAR; INTRAVENOUS; SUBCUTANEOUS
Status: DISCONTINUED | OUTPATIENT
Start: 2019-03-14 | End: 2019-03-15

## 2019-03-14 RX ORDER — DEXAMETHASONE SODIUM PHOSPHATE 4 MG/ML
INJECTION, SOLUTION INTRA-ARTICULAR; INTRALESIONAL; INTRAMUSCULAR; INTRAVENOUS; SOFT TISSUE PRN
Status: DISCONTINUED | OUTPATIENT
Start: 2019-03-14 | End: 2019-03-14 | Stop reason: SURG

## 2019-03-14 RX ORDER — AMOXICILLIN 250 MG
1 CAPSULE ORAL NIGHTLY
Status: DISCONTINUED | OUTPATIENT
Start: 2019-03-14 | End: 2019-03-16 | Stop reason: HOSPADM

## 2019-03-14 RX ORDER — HYDRALAZINE HYDROCHLORIDE 20 MG/ML
10 INJECTION INTRAMUSCULAR; INTRAVENOUS
Status: DISCONTINUED | OUTPATIENT
Start: 2019-03-14 | End: 2019-03-16 | Stop reason: HOSPADM

## 2019-03-14 RX ORDER — IPRATROPIUM BROMIDE AND ALBUTEROL SULFATE 2.5; .5 MG/3ML; MG/3ML
3 SOLUTION RESPIRATORY (INHALATION)
Status: DISCONTINUED | OUTPATIENT
Start: 2019-03-14 | End: 2019-03-14 | Stop reason: HOSPADM

## 2019-03-14 RX ORDER — DOCUSATE SODIUM 100 MG/1
100 CAPSULE, LIQUID FILLED ORAL 2 TIMES DAILY
Status: DISCONTINUED | OUTPATIENT
Start: 2019-03-14 | End: 2019-03-16 | Stop reason: HOSPADM

## 2019-03-14 RX ORDER — OXYCODONE HYDROCHLORIDE 10 MG/1
20 TABLET ORAL
Status: DISCONTINUED | OUTPATIENT
Start: 2019-03-14 | End: 2019-03-15

## 2019-03-14 RX ORDER — DIPHENHYDRAMINE HCL 25 MG
25 TABLET ORAL EVERY 6 HOURS PRN
Status: DISCONTINUED | OUTPATIENT
Start: 2019-03-14 | End: 2019-03-16 | Stop reason: HOSPADM

## 2019-03-14 RX ORDER — ONDANSETRON 4 MG/1
4 TABLET, ORALLY DISINTEGRATING ORAL EVERY 4 HOURS PRN
Status: DISCONTINUED | OUTPATIENT
Start: 2019-03-14 | End: 2019-03-16 | Stop reason: HOSPADM

## 2019-03-14 RX ORDER — HYDROMORPHONE HYDROCHLORIDE 1 MG/ML
0.4 INJECTION, SOLUTION INTRAMUSCULAR; INTRAVENOUS; SUBCUTANEOUS
Status: DISCONTINUED | OUTPATIENT
Start: 2019-03-14 | End: 2019-03-14 | Stop reason: HOSPADM

## 2019-03-14 RX ORDER — TIZANIDINE 4 MG/1
1 TABLET ORAL
Status: ON HOLD | COMMUNITY
End: 2019-03-15

## 2019-03-14 RX ORDER — SODIUM CHLORIDE, SODIUM LACTATE, POTASSIUM CHLORIDE, CALCIUM CHLORIDE 600; 310; 30; 20 MG/100ML; MG/100ML; MG/100ML; MG/100ML
INJECTION, SOLUTION INTRAVENOUS CONTINUOUS
Status: DISCONTINUED | OUTPATIENT
Start: 2019-03-14 | End: 2019-03-14 | Stop reason: HOSPADM

## 2019-03-14 RX ADMIN — ONDANSETRON 4 MG: 2 INJECTION INTRAMUSCULAR; INTRAVENOUS at 11:19

## 2019-03-14 RX ADMIN — CEFAZOLIN 2 G: 330 INJECTION, POWDER, FOR SOLUTION INTRAMUSCULAR; INTRAVENOUS at 09:49

## 2019-03-14 RX ADMIN — PROPOFOL 200 MG: 10 INJECTION, EMULSION INTRAVENOUS at 09:40

## 2019-03-14 RX ADMIN — OXYCODONE HYDROCHLORIDE 20 MG: 10 TABLET ORAL at 18:55

## 2019-03-14 RX ADMIN — SODIUM CHLORIDE, POTASSIUM CHLORIDE, SODIUM LACTATE AND CALCIUM CHLORIDE: 600; 310; 30; 20 INJECTION, SOLUTION INTRAVENOUS at 11:18

## 2019-03-14 RX ADMIN — FENTANYL CITRATE 25 MCG: 50 INJECTION, SOLUTION INTRAMUSCULAR; INTRAVENOUS at 12:25

## 2019-03-14 RX ADMIN — METHOCARBAMOL TABLETS 750 MG: 750 TABLET, COATED ORAL at 19:04

## 2019-03-14 RX ADMIN — ACETAMINOPHEN 1000 MG: 500 TABLET ORAL at 18:55

## 2019-03-14 RX ADMIN — ROCURONIUM BROMIDE 50 MG: 10 INJECTION, SOLUTION INTRAVENOUS at 09:40

## 2019-03-14 RX ADMIN — POTASSIUM CHLORIDE AND SODIUM CHLORIDE: 900; 150 INJECTION, SOLUTION INTRAVENOUS at 15:03

## 2019-03-14 RX ADMIN — SODIUM CHLORIDE, SODIUM LACTATE, POTASSIUM CHLORIDE, CALCIUM CHLORIDE: 600; 310; 30; 20 INJECTION, SOLUTION INTRAVENOUS at 09:33

## 2019-03-14 RX ADMIN — OXYCODONE HYDROCHLORIDE 20 MG: 10 TABLET ORAL at 21:55

## 2019-03-14 RX ADMIN — DEXAMETHASONE SODIUM PHOSPHATE 4 MG: 4 INJECTION, SOLUTION INTRAMUSCULAR; INTRAVENOUS at 09:49

## 2019-03-14 RX ADMIN — CEFAZOLIN SODIUM 2 G: 2 INJECTION, SOLUTION INTRAVENOUS at 16:17

## 2019-03-14 RX ADMIN — SUGAMMADEX 200 MG: 100 INJECTION, SOLUTION INTRAVENOUS at 11:26

## 2019-03-14 RX ADMIN — FENTANYL CITRATE 25 MCG: 50 INJECTION, SOLUTION INTRAMUSCULAR; INTRAVENOUS at 12:27

## 2019-03-14 RX ADMIN — ALBUTEROL SULFATE 2 PUFF: 90 AEROSOL, METERED RESPIRATORY (INHALATION) at 22:01

## 2019-03-14 RX ADMIN — OXYCODONE HYDROCHLORIDE 20 MG: 10 TABLET ORAL at 14:57

## 2019-03-14 RX ADMIN — ATORVASTATIN CALCIUM 40 MG: 20 TABLET, FILM COATED ORAL at 20:32

## 2019-03-14 RX ADMIN — HYDROMORPHONE HYDROCHLORIDE 0.4 MG: 1 INJECTION, SOLUTION INTRAMUSCULAR; INTRAVENOUS; SUBCUTANEOUS at 13:34

## 2019-03-14 RX ADMIN — HYDROMORPHONE HYDROCHLORIDE 0.2 MG: 1 INJECTION, SOLUTION INTRAMUSCULAR; INTRAVENOUS; SUBCUTANEOUS at 13:39

## 2019-03-14 RX ADMIN — ACETAMINOPHEN 1000 MG: 500 TABLET ORAL at 22:01

## 2019-03-14 RX ADMIN — LIDOCAINE HYDROCHLORIDE 40 MG: 20 INJECTION, SOLUTION INFILTRATION; PERINEURAL at 09:40

## 2019-03-14 RX ADMIN — HYDROMORPHONE HYDROCHLORIDE 1 MG: 1 INJECTION, SOLUTION INTRAMUSCULAR; INTRAVENOUS; SUBCUTANEOUS at 23:36

## 2019-03-14 RX ADMIN — OXYCODONE HYDROCHLORIDE 10 MG: 5 SOLUTION ORAL at 12:01

## 2019-03-14 RX ADMIN — GABAPENTIN 300 MG: 300 CAPSULE ORAL at 14:57

## 2019-03-14 RX ADMIN — SODIUM CHLORIDE, POTASSIUM CHLORIDE, SODIUM LACTATE AND CALCIUM CHLORIDE: 600; 310; 30; 20 INJECTION, SOLUTION INTRAVENOUS at 09:34

## 2019-03-14 RX ADMIN — ANTACID TABLETS 500 MG: 500 TABLET, CHEWABLE ORAL at 18:54

## 2019-03-14 RX ADMIN — HYDROMORPHONE HYDROCHLORIDE 1 MG: 1 INJECTION, SOLUTION INTRAMUSCULAR; INTRAVENOUS; SUBCUTANEOUS at 16:27

## 2019-03-14 RX ADMIN — GABAPENTIN 300 MG: 300 CAPSULE ORAL at 20:32

## 2019-03-14 RX ADMIN — CEFAZOLIN SODIUM 2 G: 2 INJECTION, SOLUTION INTRAVENOUS at 22:01

## 2019-03-14 RX ADMIN — DOCUSATE SODIUM 100 MG: 100 CAPSULE, LIQUID FILLED ORAL at 18:55

## 2019-03-14 RX ADMIN — FENTANYL CITRATE 50 MCG: 50 INJECTION, SOLUTION INTRAMUSCULAR; INTRAVENOUS at 12:20

## 2019-03-14 RX ADMIN — FENTANYL CITRATE 50 MCG: 50 INJECTION, SOLUTION INTRAMUSCULAR; INTRAVENOUS at 12:34

## 2019-03-14 RX ADMIN — HYDROMORPHONE HYDROCHLORIDE 1 MG: 1 INJECTION, SOLUTION INTRAMUSCULAR; INTRAVENOUS; SUBCUTANEOUS at 20:32

## 2019-03-14 RX ADMIN — SENNOSIDES AND DOCUSATE SODIUM 1 TABLET: 8.6; 5 TABLET ORAL at 21:00

## 2019-03-14 RX ADMIN — HYDROMORPHONE HYDROCHLORIDE 0.4 MG: 1 INJECTION, SOLUTION INTRAMUSCULAR; INTRAVENOUS; SUBCUTANEOUS at 13:28

## 2019-03-14 RX ADMIN — FENTANYL CITRATE 50 MCG: 50 INJECTION, SOLUTION INTRAMUSCULAR; INTRAVENOUS at 12:39

## 2019-03-14 ASSESSMENT — COGNITIVE AND FUNCTIONAL STATUS - GENERAL
CLIMB 3 TO 5 STEPS WITH RAILING: A LITTLE
SUGGESTED CMS G CODE MODIFIER MOBILITY: CI
DAILY ACTIVITIY SCORE: 24
MOBILITY SCORE: 23
SUGGESTED CMS G CODE MODIFIER DAILY ACTIVITY: CH

## 2019-03-14 ASSESSMENT — COPD QUESTIONNAIRES
DURING THE PAST 4 WEEKS HOW MUCH DID YOU FEEL SHORT OF BREATH: SOME OF THE TIME
HAVE YOU SMOKED AT LEAST 100 CIGARETTES IN YOUR ENTIRE LIFE: YES
DO YOU EVER COUGH UP ANY MUCUS OR PHLEGM?: YES, A FEW DAYS A WEEK OR MONTH
COPD SCREENING SCORE: 6

## 2019-03-14 ASSESSMENT — PATIENT HEALTH QUESTIONNAIRE - PHQ9
1. LITTLE INTEREST OR PLEASURE IN DOING THINGS: NOT AT ALL
2. FEELING DOWN, DEPRESSED, IRRITABLE, OR HOPELESS: NOT AT ALL
SUM OF ALL RESPONSES TO PHQ9 QUESTIONS 1 AND 2: 0

## 2019-03-14 ASSESSMENT — LIFESTYLE VARIABLES
EVER_SMOKED: YES
EVER_SMOKED: YES

## 2019-03-14 NOTE — ANESTHESIA TIME REPORT
Times      Start Time                 End Time                    Dr. Juanito Ruiz Name      3/14/2019 9:34 AM 3/14/2019 11:40 AM 96                                    Premium Reason  Non-Premium     Anesthesia Start and Stop Event Times     Date Time Event    3/14/2019 0934 Anesthesia Start     1140 Anesthesia Stop          Lumbar stenosis with claudication

## 2019-03-14 NOTE — ANESTHESIA PROCEDURE NOTES
Airway  Date/Time: 3/14/2019 9:42 AM  Performed by: FLORENCE JOY  Authorized by: FLORENCE JOY     Location:  OR  Urgency:  Elective  Indications for Airway Management:  Anesthesia  Spontaneous Ventilation: absent    Sedation Level:  Deep  Preoxygenated: Yes    Patient Position:  Sniffing  Mask Difficulty Assessment:  0 - not attempted  Final Airway Type:  Endotracheal airway  Final Endotracheal Airway:  ETT  Cuffed: Yes    Technique Used for Successful ETT Placement:  Direct laryngoscopy  Insertion Site:  Oral  Blade Type:  Eugene  Laryngoscope Blade/Videolaryngoscope Blade Size:  3  ETT Size (mm):  7.5  Measured from:  Lips  ETT to Lips (cm):  23  Placement Verified by: auscultation and capnometry    Cormack-Lehane Classification:  Grade I - full view of glottis  Number of Attempts at Approach:  1

## 2019-03-14 NOTE — ANESTHESIA POSTPROCEDURE EVALUATION
Patient: Bayron Parikh    Procedure Summary     Date:  03/14/19 [facility]    Anesthesia Start:  0934 Anesthesia Stop:  1140    Procedures:       LUMBAR LAMINECTOMY DISKECTOMY- L4-5, MEDIAL FACETECTOMY (Spine Lumbar)      FORAMINOTOMY (Bilateral Spine Lumbar) Diagnosis:  (LUMBAR SPONDYLOSIS)    Surgeon:  Edmond Fung M.D. Responsible Provider:  Brando Amaral M.D.    Anesthesia Type:  general ASA Status:  2          Final Anesthesia Type: general  Last vitals  BP   NIBP: 131/60 (03/14/19 1151)    Temp 97.3       Pulse   Pulse: 73 (03/14/19 1151), Heart Rate (Monitored): 73 (03/14/19 1151)   Resp   14 (03/14/19 1151)    SpO2   96 % (03/14/19 1151)      Anesthesia Post Evaluation    Patient location during evaluation: PACU  Patient participation: complete - patient participated  Level of consciousness: awake and alert    Airway patency: patent  Anesthetic complications: no  Cardiovascular status: hemodynamically stable  Respiratory status: acceptable  Hydration status: euvolemic    PONV: none           Pain Rating Scale (NPRS): 3 - Sometimes distracts me

## 2019-03-14 NOTE — OR NURSING
"Pt on room air.  No c/o nausea, tolerating PO fluids/juice and medication.  Pain 5/10, despite frequent repositioning, discomfort described as \"pressure and sore.\"  Pt 5+ strength upper and lower extremities.  Lower back dressing CDI.  VSS, afebrile, HARTLEY, A/O x4.    Glasses, upper and lower dentures in place.  One green duffel bag and one blue pt belongings bag on [facility].    "

## 2019-03-14 NOTE — ANESTHESIA PREPROCEDURE EVALUATION
Relevant Problems   (+) COPD mixed type (HCC)   Low back and leg pain    Physical Exam    Airway   Mallampati: II  TM distance: >3 FB  Neck ROM: full       Cardiovascular - normal exam  Rhythm: regular  Rate: normal  (-) murmur     Dental   (+) upper dentures           Pulmonary - normal exam  Breath sounds clear to auscultation     Abdominal    Neurological - normal exam               Anesthesia Plan    ASA 2       Plan - general       Airway plan will be ETT        Induction: intravenous    Postoperative Plan: Postoperative administration of opioids is intended.    Pertinent diagnostic labs and testing reviewed    Informed Consent:    Anesthetic plan and risks discussed with patient.    Use of blood products discussed with: patient whom consented to blood products.

## 2019-03-14 NOTE — ANESTHESIA QCDR
2019 [facility] Clinical Data Registry (for Quality Improvement)     Postoperative nausea/vomiting risk protocol (Adult = 18 yrs and Pediatric 3-17 yrs)- (430 and 463)  General inhalation anesthetic (NOT TIVA) with PONV risk factors: Yes  Provision of anti-emetic therapy with at least 2 different classes of agents: Yes   Patient DID NOT receive anti-emetic therapy and reason is documented in Medical Record:  N/A    Multimodal Pain Management- (AQI59)  Patient undergoing Elective Surgery (i.e. Outpatient, or ASC, or Prescheduled Surgery prior to Hospital Admission): Yes  Use of Multimodal Pain Management, two or more drugs and/or interventions, NOT including systemic opioids: Yes   Exception: Documented allergy to multiple classes of analgesics:  N/A    PACU assessment of acute postoperative pain prior to Anesthesia Care End- Applies to Patients Age = 18- (ABG7)  Initial PACU pain score is which of the following: < 7/10  Patient unable to report pain score: N/A    Post-anesthetic transfer of care checklist/protocol to PACU/ICU- (426 and 427)  Upon conclusion of case, patient transferred to which of the following locations: PACU/Non-ICU  Use of transfer checklist/protocol: Yes  Exclusion: Service Performed in Patient Hospital Room (and thus did not require transfer): N/A    PACU Reintubation- (AQI31)  General anesthesia requiring endotracheal intubation (ETT) along with subsequent extubation in OR or PACU: Yes  Required reintubation in the PACU: No   Extubation was a planned trial documented in the medical record prior to removal of the original airway device:  N/A    Unplanned admission to ICU related to anesthesia service up through end of PACU care- (MD51)  Unplanned admission to ICU (not initially anticipated at anesthesia start time): No

## 2019-03-14 NOTE — OP REPORT
Neurosurgery Operative Report    Patient: Bayron Parikh    MRN: [id number]    Procedure date: 03/14/19    Procedure(s):  LUMBAR LAMINECTOMY DISKECTOMY- L4-5, MEDIAL FACETECTOMY - Wound Class: Clean  FORAMINOTOMY - Wound Class: Clean    1.  L4-L5 laminectomy with bilateral medial facetectomies and bilateral foraminotomies with decompression of the L4, L5, and S1 nerve roots    Pre-Op Diagnosis: Lumbar spondylosis with stenosis and radiculopathy    Post-Op Diagnosis: Lumbar spondylosis with stenosis and radiculopathy    Surgeon: Edmond Fung M.D.    Assistant: Beatriz Rich PA-C    Type of Anesthesia: General    Anesthesiologist: Brando Amaral M.D.    Surgical Staff: Circulator: Ivania Dooley R.N.; Cheryl Jaffe R.N.  Scrub Person: Casey Carlisle R.N.; Jeanine Greenberg  Radiology Technologist: Julia Lopez    Clinical Preamble:  Bayron Parikh is a 73-year-old gentleman who has had a long-standing low back pain and intermittent leg symptoms.  He is followed by Dr. Saunders from pain management.  In June 2018 he lifted something heavy and developed radiculopathy radiating down his right leg in the L5 distribution.  MRI showed severe spondylosis with stenosis and a stable spondylolisthesis, and there was severe compression on the right side at L4-L5 due to facet hypertrophy with foraminal stenosis.  A decision was made to proceed with L4-L5 laminectomy with bilateral foraminotomies and medial facetectomies and an attempt to decompress the nerve roots and alleviate his symptoms.    The risks, benefits, and alternatives to surgery were discussed in detail with the patient preoperatively. The risks of the operation include, but are not limited to: risks associated with general anesthesia, bleeding, infection, incomplete resolution of the symptoms, need for future surgery, neurologic deficit, and/or death. Informed consent was obtained.    Description of Surgical Procedure:  The patient was brought to  the operating room and general anesthesia and endotracheal intubation were obtained. The patient was positioned prone on a Kye frame and all bony prominences were well-padded. A Knutson catheter was inserted. Pneumatic compression devices were utilized. The patient was given 2 g of Ancef within 60 minutes of making skin incision. A proposed incision was marked on the skin in the midline over the spinous processes from L3-S1.  This was confirmed with the preoperative x-ray the patient was then prepared and draped in the usual sterile fashion using ChloraPrep. The proposed incision was infiltrated with 1% lidocaine with 1:200,000 epinephrine prior to making the skin incision.    A skin incision was made with a scalpel and carried down through the subcutaneous tissues with monopolar cautery.  Monopolar cautery was then used to dissect the paraspinous muscles off the spinous processes and lamina of L4 and L5.  The inferior aspect of L3 was also exposed as was the superior aspect of S1.  The dissection was continued out laterally to expose the pars interarticularis at L4 and L5.  I was careful to preserve the facet capsules.  Gelpi retractors were inserted to maintain the exposure.  The levels were confirmed with the intraoperative x-ray.    Once the levels were confirmed a laminectomy was performed by creating a laminotomy with the Misonix at the base of the spinous process of L4 and L5 bilaterally.  A laminotomy was then made through the lateral aspect of the lamina of L4 and L5 bilaterally, and once this was done a Leksell rongeur was used to remove the remaining bone and also using a 3 and 4 mm Kerrison rongeur to remove the lateral bone block as well as the hypertrophied ligamentum flavum.  In this manner the lateral recesses were decompressed and bilateral foraminotomies and medial facetectomies were performed from the contralateral side.  The compression was particularly severe at the right L4-L5 foramen and the  nerve root was decompressed by doing a very generous foraminotomies with 3 and 4 mm Kerrison rongeurs.  At the end of the resection I was able to place a nerve hook out the foramina without any undue compression on the nerve roots.  The L4, L5, and S1 nerve roots were decompressed bilaterally.    Hemostasis from the lateral recesses controlled using surgical flow.  The wound was irrigated out thoroughly with bacitracin irrigation.  The wound was dry, so no drain was required.  The lumbodorsal fascia was reapproximated using interrupted 0 Vicryl sutures, and the subcutaneous tissues were closed using interrupted 2-0 Vicryl sutures.  Staples were used to close the skin.  A dressing was then applied.  The patient tolerated procedure well and there were no intraoperative complications.  Estimated blood loss was 25 mL.  All sponge and needle counts were reported as correct at the end of the procedure.    Estimated Blood Loss: 25 ml    Complications: None        Edmond Fung M.D.

## 2019-03-15 PROCEDURE — 700112 HCHG RX REV CODE 229: Performed by: PHYSICIAN ASSISTANT

## 2019-03-15 PROCEDURE — A9270 NON-COVERED ITEM OR SERVICE: HCPCS | Performed by: PHYSICIAN ASSISTANT

## 2019-03-15 PROCEDURE — 97161 PT EVAL LOW COMPLEX 20 MIN: CPT

## 2019-03-15 PROCEDURE — 700111 HCHG RX REV CODE 636 W/ 250 OVERRIDE (IP): Performed by: PHYSICIAN ASSISTANT

## 2019-03-15 PROCEDURE — G0378 HOSPITAL OBSERVATION PER HR: HCPCS

## 2019-03-15 PROCEDURE — 97165 OT EVAL LOW COMPLEX 30 MIN: CPT

## 2019-03-15 PROCEDURE — 96376 TX/PRO/DX INJ SAME DRUG ADON: CPT

## 2019-03-15 PROCEDURE — 700102 HCHG RX REV CODE 250 W/ 637 OVERRIDE(OP): Performed by: PHYSICIAN ASSISTANT

## 2019-03-15 RX ORDER — GABAPENTIN 300 MG/1
300 CAPSULE ORAL 3 TIMES DAILY
Qty: 90 CAP | Refills: 1 | Status: SHIPPED | OUTPATIENT
Start: 2019-03-15 | End: 2019-03-16

## 2019-03-15 RX ORDER — OXYCODONE HYDROCHLORIDE 10 MG/1
10-20 TABLET ORAL EVERY 4 HOURS PRN
Status: DISCONTINUED | OUTPATIENT
Start: 2019-03-15 | End: 2019-03-16 | Stop reason: HOSPADM

## 2019-03-15 RX ORDER — TIZANIDINE 4 MG/1
4 TABLET ORAL EVERY 6 HOURS PRN
Qty: 120 TAB | Refills: 1 | Status: SHIPPED | OUTPATIENT
Start: 2019-03-15 | End: 2019-03-16

## 2019-03-15 RX ORDER — OXYCODONE AND ACETAMINOPHEN 10; 325 MG/1; MG/1
1-2 TABLET ORAL EVERY 4 HOURS PRN
Status: DISCONTINUED | OUTPATIENT
Start: 2019-03-15 | End: 2019-03-15

## 2019-03-15 RX ORDER — HYDROMORPHONE HYDROCHLORIDE 1 MG/ML
1 INJECTION, SOLUTION INTRAMUSCULAR; INTRAVENOUS; SUBCUTANEOUS EVERY 4 HOURS PRN
Status: DISCONTINUED | OUTPATIENT
Start: 2019-03-15 | End: 2019-03-16 | Stop reason: HOSPADM

## 2019-03-15 RX ORDER — MELOXICAM 7.5 MG/1
7.5 TABLET ORAL
Qty: 30 TAB | Refills: 0 | Status: SHIPPED | OUTPATIENT
Start: 2019-03-15 | End: 2019-03-16

## 2019-03-15 RX ORDER — OXYCODONE AND ACETAMINOPHEN 10; 325 MG/1; MG/1
1-2 TABLET ORAL EVERY 4 HOURS PRN
Qty: 84 TAB | Refills: 0 | Status: SHIPPED | OUTPATIENT
Start: 2019-03-15 | End: 2019-03-22

## 2019-03-15 RX ORDER — ACETAMINOPHEN 500 MG
1000 TABLET ORAL EVERY 6 HOURS PRN
Status: DISCONTINUED | OUTPATIENT
Start: 2019-03-15 | End: 2019-03-16 | Stop reason: HOSPADM

## 2019-03-15 RX ADMIN — HYDROMORPHONE HYDROCHLORIDE 1 MG: 1 INJECTION, SOLUTION INTRAMUSCULAR; INTRAVENOUS; SUBCUTANEOUS at 03:15

## 2019-03-15 RX ADMIN — GABAPENTIN 300 MG: 300 CAPSULE ORAL at 05:11

## 2019-03-15 RX ADMIN — ACETAMINOPHEN 500 MG: 500 TABLET ORAL at 18:43

## 2019-03-15 RX ADMIN — SENNOSIDES AND DOCUSATE SODIUM 1 TABLET: 8.6; 5 TABLET ORAL at 19:49

## 2019-03-15 RX ADMIN — GABAPENTIN 300 MG: 300 CAPSULE ORAL at 19:49

## 2019-03-15 RX ADMIN — TIOTROPIUM BROMIDE 1 CAPSULE: 18 CAPSULE ORAL; RESPIRATORY (INHALATION) at 05:11

## 2019-03-15 RX ADMIN — OXYCODONE AND ACETAMINOPHEN 2 TABLET: 10; 325 TABLET ORAL at 15:29

## 2019-03-15 RX ADMIN — ALBUTEROL SULFATE 2 PUFF: 90 AEROSOL, METERED RESPIRATORY (INHALATION) at 11:16

## 2019-03-15 RX ADMIN — DOCUSATE SODIUM 100 MG: 100 CAPSULE, LIQUID FILLED ORAL at 05:11

## 2019-03-15 RX ADMIN — OXYCODONE AND ACETAMINOPHEN 2 TABLET: 10; 325 TABLET ORAL at 11:07

## 2019-03-15 RX ADMIN — METHOCARBAMOL TABLETS 750 MG: 750 TABLET, COATED ORAL at 13:15

## 2019-03-15 RX ADMIN — GABAPENTIN 300 MG: 300 CAPSULE ORAL at 13:15

## 2019-03-15 RX ADMIN — HYDROMORPHONE HYDROCHLORIDE 1 MG: 1 INJECTION, SOLUTION INTRAMUSCULAR; INTRAVENOUS; SUBCUTANEOUS at 06:19

## 2019-03-15 RX ADMIN — OXYCODONE HYDROCHLORIDE 20 MG: 10 TABLET ORAL at 05:09

## 2019-03-15 RX ADMIN — OXYCODONE HYDROCHLORIDE 20 MG: 10 TABLET ORAL at 21:54

## 2019-03-15 RX ADMIN — MELOXICAM 7.5 MG: 7.5 TABLET ORAL at 09:33

## 2019-03-15 RX ADMIN — OXYCODONE HYDROCHLORIDE 20 MG: 10 TABLET ORAL at 01:10

## 2019-03-15 RX ADMIN — MAGNESIUM HYDROXIDE 30 ML: 400 SUSPENSION ORAL at 10:17

## 2019-03-15 RX ADMIN — METHOCARBAMOL TABLETS 750 MG: 750 TABLET, COATED ORAL at 21:54

## 2019-03-15 RX ADMIN — METHOCARBAMOL TABLETS 750 MG: 750 TABLET, COATED ORAL at 05:15

## 2019-03-15 RX ADMIN — OXYCODONE HYDROCHLORIDE 20 MG: 10 TABLET ORAL at 07:56

## 2019-03-15 RX ADMIN — HYDROMORPHONE HYDROCHLORIDE 1 MG: 1 INJECTION, SOLUTION INTRAMUSCULAR; INTRAVENOUS; SUBCUTANEOUS at 19:50

## 2019-03-15 RX ADMIN — ANTACID TABLETS 500 MG: 500 TABLET, CHEWABLE ORAL at 05:11

## 2019-03-15 RX ADMIN — ANTACID TABLETS 500 MG: 500 TABLET, CHEWABLE ORAL at 18:43

## 2019-03-15 RX ADMIN — HYDROMORPHONE HYDROCHLORIDE 1 MG: 1 INJECTION, SOLUTION INTRAMUSCULAR; INTRAVENOUS; SUBCUTANEOUS at 10:02

## 2019-03-15 RX ADMIN — ACETAMINOPHEN 1000 MG: 500 TABLET ORAL at 05:11

## 2019-03-15 RX ADMIN — DOCUSATE SODIUM 100 MG: 100 CAPSULE, LIQUID FILLED ORAL at 18:43

## 2019-03-15 RX ADMIN — ATORVASTATIN CALCIUM 40 MG: 20 TABLET, FILM COATED ORAL at 19:49

## 2019-03-15 ASSESSMENT — COGNITIVE AND FUNCTIONAL STATUS - GENERAL
SUGGESTED CMS G CODE MODIFIER DAILY ACTIVITY: CI
STANDING UP FROM CHAIR USING ARMS: A LITTLE
DAILY ACTIVITIY SCORE: 23
MOVING FROM LYING ON BACK TO SITTING ON SIDE OF FLAT BED: A LITTLE
TURNING FROM BACK TO SIDE WHILE IN FLAT BAD: A LITTLE
SUGGESTED CMS G CODE MODIFIER MOBILITY: CK
MOBILITY SCORE: 19
CLIMB 3 TO 5 STEPS WITH RAILING: A LITTLE
HELP NEEDED FOR BATHING: A LITTLE
MOVING TO AND FROM BED TO CHAIR: A LITTLE

## 2019-03-15 ASSESSMENT — ACTIVITIES OF DAILY LIVING (ADL): TOILETING: INDEPENDENT

## 2019-03-15 ASSESSMENT — GAIT ASSESSMENTS
ASSISTIVE DEVICE: FRONT WHEEL WALKER
DISTANCE (FEET): 500
GAIT LEVEL OF ASSIST: SUPERVISED

## 2019-03-15 NOTE — THERAPY
"Occupational Therapy Evaluation completed.   Functional Status: Supv supine > < EOB, supv transfers with FWW, supv LB dressing, supv standing grooming, supv toileting   Plan of Care: Patient with no further skilled OT needs in the acute care setting at this time  Discharge Recommendations:  Equipment: No Equipment Needed. Post-acute therapy: Currently anticipate no further skilled therapy needs once patient is discharged from the inpatient setting.    See \"Rehab Therapy-Acute\" Patient Summary Report for complete documentation.    73 y.o. male s/p L4-L5 lami/diskectomy. Seen now for OT eval. Pt received EOB, spouse present. OT provided education/training on neutral spine, lifting restriction, safe body mechanics, compensatory techniques during functional activity. Pt is completing basic ADL and functional transfers with no more than supv. Recommend supv with standing shower. Pt has good support from spouse. No further acute OT needs at this time.     "

## 2019-03-15 NOTE — PROGRESS NOTES
RN MOBILITY NOTE     Surgery patient?: yes  Date of surgery: 3/14  Ambulated 50 ft on day of surgery? (N/A if today is not date of surgery): yes  Number of times ambulated 50 feet or greater today: 2  Patient has been up to chair, edge of bed or HOB 90 degrees for all meals?: yes  Goal met? (goal is ambulating at least 50 feet 2 times on day shift, one time on night shift): yes, POD#0 goal = 50ft x1, which was met  If patient did not meet mobility goal, why?: met

## 2019-03-15 NOTE — CARE PLAN
Problem: Safety  Goal: Will remain free from injury  Outcome: PROGRESSING AS EXPECTED  Call light and bedside table within reach. SBA when OOB.

## 2019-03-15 NOTE — PROGRESS NOTES
Received report and assumed pt care.  A&O x4.  Treaded socks on.  Call light and personal belongings within reach.  Bed locked and at lowest position.  HARTLEY.  VSS.  Complains of a lot of pain.  Medication given.  Refused bed alarm.

## 2019-03-15 NOTE — PROGRESS NOTES
Pt oriented to room, call light, and fall precautions. Wife at bedside. PRN pain meds administered. Call light and bedside table within reach. VS and assessment completed.

## 2019-03-15 NOTE — DISCHARGE SUMMARY
Discharge Summary    CHIEF COMPLAINT ON ADMISSION  No chief complaint on file.      Reason for Admission  LUMBAR SPONDYLOSIS     Admission Date  3/14/2019    CODE STATUS  Full Code    HPI & HOSPITAL COURSE  This is a 73 y.o. male here with right lower extremity radiculopathy& claudication. Surgery went as planned. Post-op course uneventful.        Therefore, he is discharged in good and stable condition to home with close outpatient follow-up.    The patient recovered much more quickly than anticipated on admission.    Discharge Date  Likely 3/15/19 if meeting criteria    FOLLOW UP ITEMS POST DISCHARGE  Keep scheduled apt    DISCHARGE DIAGNOSES  Active Problems:    * No active hospital problems. *  Resolved Problems:    * No resolved hospital problems. *      FOLLOW UP  No future appointments.  No follow-up provider specified.    MEDICATIONS ON DISCHARGE     Medication List      START taking these medications      Instructions   meloxicam 7.5 MG Tabs  Commonly known as:  MOBIC   Take 1 Tab by mouth every morning with breakfast.  Dose:  7.5 mg        CHANGE how you take these medications      Instructions   * oxyCODONE-acetaminophen  MG Tabs  What changed:  Another medication with the same name was added. Make sure you understand how and when to take each.  Commonly known as:  PERCOCET-10   Take 1-2 Tabs by mouth every 6 hours as needed for Severe Pain.  Dose:  1-2 Tab     * oxyCODONE-acetaminophen  MG Tabs  What changed:  You were already taking a medication with the same name, and this prescription was added. Make sure you understand how and when to take each.  Commonly known as:  PERCOCET-10   Take 1-2 Tabs by mouth every four hours as needed for up to 7 days.  Dose:  1-2 Tab     tizanidine 4 MG Tabs  What changed:  when to take this  Commonly known as:  ZANAFLEX   Take 1 Tab by mouth every 6 hours as needed.  Dose:  4 mg        * This list has 2 medication(s) that are the same as other medications  prescribed for you. Read the directions carefully, and ask your doctor or other care provider to review them with you.            CONTINUE taking these medications      Instructions   albuterol 108 (90 Base) MCG/ACT Aers inhalation aerosol   Inhale 2 Puffs by mouth every 6 hours as needed.  Dose:  2 Puff     cloNIDine 0.1 MG Tabs  Commonly known as:  CATAPRES   Take 0.1 mg by mouth 2 times a day as needed.  Dose:  0.1 mg     gabapentin 300 MG Caps  Commonly known as:  NEURONTIN   Take 1 Cap by mouth 3 times a day.  Dose:  300 mg     ketoconazole 2 % shampoo  Commonly known as:  NIZORAL   Apply 1 Each to affected area(s) 1 time daily as needed.  Dose:  1 Each     LIPITOR 40 MG Tabs  Generic drug:  atorvastatin   Take 40 mg by mouth every evening.  Dose:  40 mg     multivitamin Tabs   Take 1 Tab by mouth every day.  Dose:  1 Tab     tiotropium 18 MCG Caps  Commonly known as:  SPIRIVA   Inhale 1 Cap by mouth every day.  Dose:  18 mcg          Percocet 10mg 1-2 tabs q4hr x7d, #84. Pt is a Pt of Dr Saunders, this plan with discussed with Dr Saunders & the pt. The pt will refill his Dr Saunders Rx after this one runs out  meloxicam 7.5mg  Gabapentin 300mg  Tizanidine 4mg     Allergies  No Known Allergies    DIET  Orders Placed This Encounter   Procedures   • Diet Order Regular     Standing Status:   Standing     Number of Occurrences:   1     Order Specific Question:   Diet:     Answer:   Regular [1]       ACTIVITY  As tolerated.  Weight bearing as tolerated    CONSULTATIONS       PROCEDURES  L4-5 laminectomy& diskectomy    LABORATORY  Lab Results   Component Value Date    SODIUM 138 02/13/2019    POTASSIUM 4.7 02/13/2019    CHLORIDE 106 02/13/2019    CO2 25 02/13/2019    GLUCOSE 103 (H) 02/13/2019    BUN 19 02/13/2019    CREATININE 0.98 02/13/2019        Lab Results   Component Value Date    WBC 8.9 02/13/2019    HEMOGLOBIN 15.5 02/13/2019    HEMATOCRIT 46.8 02/13/2019    PLATELETCT 253 02/13/2019        Total time of the  discharge process exceeds 15 minutes.

## 2019-03-15 NOTE — PROGRESS NOTES
"Neurosurgery Progress Note    Subjective:  RLE pain improved, \"very sore today\"    Exam:  A&O, speech fluent & appropriate  Sensation grossly intact bilat LE  Strength:  Right IP 4+, quad/DF/PF 5  Left IP/quad/DF/PF 5  Dressing CDI, no drainage    BP  Min: 125/50  Max: 152/73  Pulse  Av.7  Min: 60  Max: 89  Resp  Avg: 15.7  Min: 12  Max: 23  Temp  Av.7 °C (98.1 °F)  Min: 36.2 °C (97.1 °F)  Max: 37.1 °C (98.7 °F)  SpO2  Av.2 %  Min: 0 %  Max: 99 %    No Data Recorded                      Intake/Output       19 - 03/15/19 0659 03/15/19 0700 - 19 0659      8666-1828 0809-8092 Total 4051-0844 7657-9984 Total       Intake    P.O.  640  -- 640  --  -- --    P.O. 640 -- 640 -- -- --    I.V.  2200  495 2695  --  -- --    Crystalloid Intake 1000 -- 1000 -- -- --    Volume (mL) (lactated ringers infusion) 1200 -- 1200 -- -- --    Volume (mL) (0.9 % NaCl with KCl 20 mEq infusion) -- 495 495 -- -- --    IV Piggyback  --  100 100  --  -- --    Volume (mL) (ceFAZolin in dextrose (ANCEF) IVPB premix 2 g) -- 100 100 -- -- --    Total Intake 2840 595 3435 -- -- --       Output    Urine  --  -- --  --  -- --    Number of Times Voided 3 x 1 x 4 x -- -- --    Blood  50  -- 50  --  -- --    Est. Blood Loss 50 -- 50 -- -- --    Total Output 50 -- 50 -- -- --       Net I/O     2790 595 3385 -- -- --            Intake/Output Summary (Last 24 hours) at 03/15/19 0851  Last data filed at 19   Gross per 24 hour   Intake             3435 ml   Output               50 ml   Net             3385 ml            • albuterol  2 Puff Q6HRS PRN   • atorvastatin  40 mg Nightly   • gabapentin  300 mg TID   • tiotropium  1 Cap DAILY   • Pharmacy Consult Request  1 Each PHARMACY TO DOSE   • MD ALERT...DO NOT ADMINISTER NSAIDS or ASPIRIN unless ORDERED By Neurosurgery  1 Each PRN   • docusate sodium  100 mg BID   • senna-docusate  1 Tab Nightly   • senna-docusate  1 Tab Q24HRS PRN   • polyethylene glycol/lytes  1 " Packet BID PRN   • magnesium hydroxide  30 mL QDAY PRN   • bisacodyl  10 mg Q24HRS PRN   • fleet  1 Each Once PRN   • Respiratory Care per Protocol   Continuous RT   • 0.9 % NaCl with KCl 20 mEq 1,000 mL   Continuous   • acetaminophen  1,000 mg Q6HRS   • meloxicam  7.5 mg QDAY with Breakfast   • oxyCODONE immediate release  10 mg Q3HRS PRN   • oxyCODONE immediate release  20 mg Q3HRS PRN   • HYDROmorphone  1 mg Q3HRS PRN   • diphenhydrAMINE  25 mg Q6HRS PRN    Or   • diphenhydrAMINE  25 mg Q6HRS PRN   • ondansetron  4 mg Q4HRS PRN   • ondansetron  4 mg Q4HRS PRN   • methocarbamol  750 mg Q8HRS PRN    Or   • cyclobenzaprine  10 mg Q8HRS PRN    Or   • diazePAM  5 mg Q4HRS PRN   • enalaprilat  1.25 mg Q6HRS PRN   • hydrALAZINE  10 mg Q HOUR PRN   • cloNIDine  0.1 mg Q4HRS PRN   • benzocaine-menthol  1 Lozenge Q2HRS PRN   • calcium carbonate  500 mg BID       Assessment and Plan:  Hospital day #2  POD #1 L4-5 laminectomy, discectomy   Prophylactic anticoagulation: no         Start date/time: tbd  Silver dressing to remain x7days  Mobilize with PT/OT  Continue pain control  Plan to DC tomorrow (3/16) if meeting criteria  DC summary, med rec complete. Rx in chart

## 2019-03-15 NOTE — CARE PLAN
Problem: Venous Thromboembolism (VTW)/Deep Vein Thrombosis (DVT) Prevention:  Goal: Patient will participate in Venous Thrombosis (VTE)/Deep Vein Thrombosis (DVT)Prevention Measures  Outcome: PROGRESSING AS EXPECTED  SCDs on when in bed and pt ambulating.

## 2019-03-15 NOTE — PROGRESS NOTES
Bedside report given by day RN. Assumed care at 1900. Pt A&Ox4, denies numbness and tingling. Reporting 5-6/10 low back pain, medicated per MAR.  POC discussed. Pt resting comfortably, treaded socks in place, bed locked and in lowest position, bed alarm on, call light in reach, hourly rounding in place.

## 2019-03-15 NOTE — THERAPY
"Physical Therapy Evaluation completed.   Bed Mobility:  Supine to Sit: Supervised  Transfers: Sit to Stand: Supervised  Gait: Level Of Assist: Supervised with Front-Wheel Walker       Plan of Care: Patient with no further skilled PT needs in the acute care setting at this time  Discharge Recommendations: Equipment: FWW (pt owns). Recommend outpatient for continued physical therapy services.    See \"Rehab Therapy-Acute\" Patient Summary Report for complete documentation.     Pt is a 74yo male s/p L4-5 laminectomy/diskectomy and L4-S1 decompression by Dr. Fung on 3/14. No brace ordered. Educated and provided handout regarding post-op spinal precautions and log roll, pt with good return demo. Discussed use of FWW at home and proper way to size, also discussed pain management techniques such as changing position, and utilizing ice. Pt required VCs for log roll in/out of bed, cues to keep knees bent. Ambulated 500ft with FWW and SPV. Negotiated 4 stairs, SPV, no LOB. Pt and spouse with no concerns regarding return home. Recommend pt continues to ambulate while in acute, and recommend outpatient PT when medically cleared. No further acute PT needs.   "

## 2019-03-15 NOTE — DISCHARGE PLANNING
Anticipated Discharge Disposition:   home    Action:   Met with pt and wife.   She works and may not be able to drive pt to appointments but he said he will use [employer]. He does not use any DME and home O2 so pt will need to be weaned off O2. They have no safety concerns.     Barriers to Discharge:   Surgical clearance    Plan:   RN kindly notify CM if there are any discharge concerns.

## 2019-03-15 NOTE — DISCHARGE PLANNING
Care Transition Team Assessment    Information Source  Orientation : Oriented x 4  Information Given By: Patient  Informant's Name: Bayron Parikh  Who is responsible for making decisions for patient? : Patient         Elopement Risk  Legal Hold: No  Ambulatory or Self Mobile in Wheelchair: Yes  Disoriented: No  Psychiatric Symptoms: None  History of Wandering: No  Elopement this Admit: No  Vocalizing Wanting to Leave: No  Displays Behaviors, Body Language Wanting to Leave: No-Not at Risk for Elopement  Elopement Risk: Not at Risk for Elopement    Interdisciplinary Discharge Planning  Does Admitting Nurse Feel This Could be a Complex Discharge?: No  Primary Care Physician: Leon Ayers   Lives with - Patient's Self Care Capacity: Spouse  Support Systems: Spouse / Significant Other  Housing / Facility: [address] ([address])  Do You Take your Prescribed Medications Regularly: Yes  Able to Return to Previous ADL's: Yes  Mobility Issues: No  Prior Services: None  Patient Expects to be Discharged to:: Home  Assistance Needed: Yes  Durable Medical Equipment: Not Applicable    Discharge Preparedness  What is your plan after discharge?: Home with help  What are your discharge supports?: Spouse  Prior Functional Level: Ambulatory, Independent with Activities of Daily Living, Independent with Medication Management  Difficulity with ADLs: None  Difficulity with IADLs: Driving    Functional Assesment  Prior Functional Level: Ambulatory, Independent with Activities of Daily Living, Independent with Medication Management    Finances  Financial Barriers to Discharge: No  Prescription Coverage: Yes    Vision / Hearing Impairment  Vision Impairment : Yes  Right Eye Vision: Wears Glasses  Left Eye Vision: Wears Glasses  Hearing Impairment : No    Values / Beliefs / Concerns  Spiritual Requests During Hospitalization: No         Domestic Abuse  Have you ever been the victim of abuse or violence?: No  Physical Abuse or Sexual Abuse:  No  Verbal Abuse or Emotional Abuse: No  Possible Abuse Reported to:: Not Applicable    Psychological Assessment  History of Substance Abuse: None    Discharge Risks or Barriers  Discharge risks or barriers?: No    Anticipated Discharge Information  Anticipated discharge disposition: Home

## 2019-03-16 VITALS
WEIGHT: 182.1 LBS | DIASTOLIC BLOOD PRESSURE: 81 MMHG | TEMPERATURE: 100.6 F | BODY MASS INDEX: 26.07 KG/M2 | RESPIRATION RATE: 16 BRPM | OXYGEN SATURATION: 92 % | SYSTOLIC BLOOD PRESSURE: 144 MMHG | HEART RATE: 92 BPM | HEIGHT: 70 IN

## 2019-03-16 PROCEDURE — 700102 HCHG RX REV CODE 250 W/ 637 OVERRIDE(OP): Performed by: PHYSICIAN ASSISTANT

## 2019-03-16 PROCEDURE — A9270 NON-COVERED ITEM OR SERVICE: HCPCS | Performed by: PHYSICIAN ASSISTANT

## 2019-03-16 PROCEDURE — 700101 HCHG RX REV CODE 250: Performed by: PHYSICIAN ASSISTANT

## 2019-03-16 PROCEDURE — 700112 HCHG RX REV CODE 229: Performed by: PHYSICIAN ASSISTANT

## 2019-03-16 PROCEDURE — 94664 DEMO&/EVAL PT USE INHALER: CPT

## 2019-03-16 PROCEDURE — G0378 HOSPITAL OBSERVATION PER HR: HCPCS

## 2019-03-16 RX ORDER — TIZANIDINE 4 MG/1
4 TABLET ORAL EVERY 6 HOURS PRN
Qty: 30 TAB | Refills: 0 | Status: ON HOLD | OUTPATIENT
Start: 2019-03-16 | End: 2019-10-10 | Stop reason: SDUPTHER

## 2019-03-16 RX ORDER — MELOXICAM 7.5 MG/1
7.5 TABLET ORAL
Qty: 30 TAB | Refills: 0 | Status: SHIPPED | OUTPATIENT
Start: 2019-03-16 | End: 2019-09-25

## 2019-03-16 RX ORDER — GABAPENTIN 300 MG/1
300 CAPSULE ORAL 3 TIMES DAILY
Qty: 90 CAP | Refills: 1 | Status: ON HOLD | OUTPATIENT
Start: 2019-03-16 | End: 2019-10-09

## 2019-03-16 RX ORDER — OXYCODONE AND ACETAMINOPHEN 10; 325 MG/1; MG/1
1-2 TABLET ORAL EVERY 8 HOURS PRN
Qty: 42 TAB | Refills: 0 | Status: SHIPPED | OUTPATIENT
Start: 2019-03-16 | End: 2019-03-23

## 2019-03-16 RX ADMIN — MAGNESIUM HYDROXIDE 30 ML: 400 SUSPENSION ORAL at 08:41

## 2019-03-16 RX ADMIN — SODIUM PHOSPHATE 133 ML: 7; 19 ENEMA RECTAL at 14:23

## 2019-03-16 RX ADMIN — GABAPENTIN 300 MG: 300 CAPSULE ORAL at 04:39

## 2019-03-16 RX ADMIN — ALBUTEROL SULFATE 2 PUFF: 90 AEROSOL, METERED RESPIRATORY (INHALATION) at 08:43

## 2019-03-16 RX ADMIN — OXYCODONE HYDROCHLORIDE 20 MG: 10 TABLET ORAL at 04:39

## 2019-03-16 RX ADMIN — MELOXICAM 7.5 MG: 7.5 TABLET ORAL at 08:46

## 2019-03-16 RX ADMIN — GABAPENTIN 300 MG: 300 CAPSULE ORAL at 14:14

## 2019-03-16 RX ADMIN — BISACODYL 10 MG: 10 SUPPOSITORY RECTAL at 11:38

## 2019-03-16 RX ADMIN — TIOTROPIUM BROMIDE 1 CAPSULE: 18 CAPSULE ORAL; RESPIRATORY (INHALATION) at 04:40

## 2019-03-16 RX ADMIN — DOCUSATE SODIUM 100 MG: 100 CAPSULE, LIQUID FILLED ORAL at 04:39

## 2019-03-16 RX ADMIN — ANTACID TABLETS 500 MG: 500 TABLET, CHEWABLE ORAL at 04:40

## 2019-03-16 RX ADMIN — ACETAMINOPHEN 1000 MG: 500 TABLET ORAL at 08:46

## 2019-03-16 RX ADMIN — OXYCODONE HYDROCHLORIDE 20 MG: 10 TABLET ORAL at 10:20

## 2019-03-16 RX ADMIN — OXYCODONE HYDROCHLORIDE 20 MG: 10 TABLET ORAL at 14:14

## 2019-03-16 NOTE — DISCHARGE INSTRUCTIONS
Discharge Instructions    Discharged to home by car with relative. Discharged via wheelchair, hospital escort: Yes.  Special equipment needed: Not Applicable    Be sure to schedule a follow-up appointment with your primary care doctor or any specialists as instructed.     Discharge Plan:     Silver dressing to remain on x7days, may shower with dressing on   Once dressing removed, leave open to air, ok to shower & pat dry   Call our office with questions, concerns, fever, wound drainage, new or worsening symptoms    Diet Plan: Discussed  Activity Level: Discussed  Confirmed Follow up Appointment: Patient to Call and Schedule Appointment  Confirmed Symptoms Management: Discussed  Medication Reconciliation Updated: Yes  Influenza Vaccine Indication: Not indicated: Previously immunized this influenza season and > 8 years of age    I understand that a diet low in cholesterol, fat, and sodium is recommended for good health. Unless I have been given specific instructions below for another diet, I accept this instruction as my diet prescription.   Other diet: Regular    Special Instructions: None    · Is patient discharged on Warfarin / Coumadin?   No     Depression / Suicide Risk    As you are discharged from this [facility] facility, it is important to learn how to keep safe from harming yourself.    Recognize the warning signs:  · Abrupt changes in personality, positive or negative- including increase in energy   · Giving away possessions  · Change in eating patterns- significant weight changes-  positive or negative  · Change in sleeping patterns- unable to sleep or sleeping all the time   · Unwillingness or inability to communicate  · Depression  · Unusual sadness, discouragement and loneliness  · Talk of wanting to die  · Neglect of personal appearance   · Rebelliousness- reckless behavior  · Withdrawal from people/activities they love  · Confusion- inability to concentrate     If you or a loved one observes any of  these behaviors or has concerns about self-harm, here's what you can do:  · Talk about it- your feelings and reasons for harming yourself  · Remove any means that you might use to hurt yourself (examples: pills, rope, extension cords, firearm)  · Get professional help from the community (Mental Health, Substance Abuse, psychological counseling)  · Do not be alone:Call your Safe Contact- someone whom you trust who will be there for you.  · Call your local CRISIS HOTLINE 481-4059 or 608-559-6737  · Call your local Children's Mobile Crisis Response Team [place] (385) 950-0939 or www.[employer]  · Call the toll free National Suicide Prevention Hotlines   · National Suicide Prevention Lifeline 156-764-RMLZ (2729)  · National Hope Line Network 800-SUICIDE (241-0476)

## 2019-03-16 NOTE — PROGRESS NOTES
Neurosurgery Progress Note    Subjective:  Pt awake, alert  No leg pain, c/o back pain   Voiding  Hx of copd    Exam:  A&O, speech fluent & appropriate  LE str 5/5, sens equal  Inc c/d/i - silver dressing was coming off/ curled up - I removed it    BP  Min: 111/65  Max: 128/67  Pulse  Av.6  Min: 80  Max: 108  Resp  Av  Min: 16  Max: 20  Temp  Av.7 °C (99.9 °F)  Min: 36.7 °C (98.1 °F)  Max: 38.2 °C (100.7 °F)  SpO2  Av.1 %  Min: 89 %  Max: 96 %    No Data Recorded                      Intake/Output       03/15/19 0700 - 19 0659 19 0700 - 19 0659      [id number] 1746-8574 Total 2196-4976 8875-9842 Total       Intake    P.O.  480  480 960  --  -- --    P.O. 480 480 960 -- -- --    Total Intake 480 480 960 -- -- --       Output    Urine  --  -- --  --  -- --    Number of Times Voided -- 1 x 1 x -- -- --    Stool  --  -- --  --  -- --    Number of Times Stooled -- -- -- 0 x -- 0 x    Total Output -- -- -- -- -- --       Net I/O     480 480 960 -- -- --            Intake/Output Summary (Last 24 hours) at 19 0935  Last data filed at 19 0600   Gross per 24 hour   Intake              720 ml   Output                0 ml   Net              720 ml            • acetaminophen  1,000 mg Q6HRS PRN   • HYDROmorphone  1 mg Q4HRS PRN   • oxyCODONE immediate-release  10-20 mg Q4HRS PRN   • albuterol  2 Puff Q6HRS PRN   • atorvastatin  40 mg Nightly   • gabapentin  300 mg TID   • tiotropium  1 Cap DAILY   • Pharmacy Consult Request  1 Each PHARMACY TO DOSE   • MD ALERT...DO NOT ADMINISTER NSAIDS or ASPIRIN unless ORDERED By Neurosurgery  1 Each PRN   • docusate sodium  100 mg BID   • senna-docusate  1 Tab Nightly   • senna-docusate  1 Tab Q24HRS PRN   • polyethylene glycol/lytes  1 Packet BID PRN   • magnesium hydroxide  30 mL QDAY PRN   • bisacodyl  10 mg Q24HRS PRN   • fleet  1 Each Once PRN   • Respiratory Care per Protocol   Continuous RT   • 0.9 % NaCl with KCl 20 mEq 1,000 mL    Continuous   • meloxicam  7.5 mg QDAY with Breakfast   • diphenhydrAMINE  25 mg Q6HRS PRN    Or   • diphenhydrAMINE  25 mg Q6HRS PRN   • ondansetron  4 mg Q4HRS PRN   • ondansetron  4 mg Q4HRS PRN   • methocarbamol  750 mg Q8HRS PRN    Or   • cyclobenzaprine  10 mg Q8HRS PRN    Or   • diazePAM  5 mg Q4HRS PRN   • enalaprilat  1.25 mg Q6HRS PRN   • hydrALAZINE  10 mg Q HOUR PRN   • cloNIDine  0.1 mg Q4HRS PRN   • benzocaine-menthol  1 Lozenge Q2HRS PRN   • calcium carbonate  500 mg BID       Assessment and Plan:  Hospital day #3  POD #2 L4-5 laminectomy, discectomy   Prophylactic anticoagulation: no         Start date/time: tbd  PT cleared pt for dc  Continue pain control  Bowel protocol -supp this am  Has fww at home   Wean O2  - keep sats 88-92% RA  D/c home today later today

## 2019-03-16 NOTE — CARE PLAN
Problem: Infection  Goal: Will remain free from infection  Hand hygiene performed before and after pt care.  Gloves worn at all times while caring for pt.

## 2019-03-16 NOTE — PROGRESS NOTES
CODY Rich notified via telephone that patient has neared his APAP 24h limit preventing him from taking more Percocet so he is unable to take oral pain meds per MAR at this time. New order received:    Change percocet 10mg PO q4h prn pain to oxycodone 10mg PO q4h prn pain

## 2019-03-16 NOTE — RESPIRATORY CARE
COPD EDUCATION by COPD CLINICAL EDUCATOR  3/16/2019  at  1:01 PM by Rosalba Whitmore     Patient interviewed by COPD education team.  Patient unable to participate in full program.  Short intervention has been conducted.  A comprehensive packet including information about COPD, treatments, and smoking cessation given.

## 2019-03-16 NOTE — PROGRESS NOTES
Received report and assumed pt care.  A&O x4.  Treaded socks on.  Call light and personal belongings within reach.  Bed locked and at lowest position.  HARTLEY.  VSS.  Pt refusing bed alarm and does not call before getting out of bed.  Steady gait.

## 2019-04-08 DIAGNOSIS — J44.9 COPD MIXED TYPE (HCC): ICD-10-CM

## 2019-04-08 RX ORDER — TIOTROPIUM BROMIDE 18 UG/1
18 CAPSULE ORAL; RESPIRATORY (INHALATION) DAILY
Qty: 30 CAP | Refills: 5 | Status: SHIPPED | OUTPATIENT
Start: 2019-04-08 | End: 2019-08-29 | Stop reason: SDUPTHER

## 2019-04-19 RX ORDER — FLUOCINONIDE 0.5 MG/ML
0.05% SOLUTION TOPICAL BID
Qty: 1 | Refills: 0 | Status: ERX

## 2019-05-29 ENCOUNTER — HOSPITAL ENCOUNTER (OUTPATIENT)
Dept: LAB | Facility: MEDICAL CENTER | Age: 74
End: 2019-05-29
Attending: NEUROLOGICAL SURGERY
Payer: MEDICARE

## 2019-05-29 LAB
BUN SERPL-MCNC: 16 MG/DL (ref 8–22)
CREAT SERPL-MCNC: 0.87 MG/DL (ref 0.5–1.4)

## 2019-05-29 PROCEDURE — 36415 COLL VENOUS BLD VENIPUNCTURE: CPT

## 2019-05-29 PROCEDURE — 82565 ASSAY OF CREATININE: CPT

## 2019-05-29 PROCEDURE — 84520 ASSAY OF UREA NITROGEN: CPT

## 2019-05-30 ENCOUNTER — HOSPITAL ENCOUNTER (OUTPATIENT)
Dept: RADIOLOGY | Facility: MEDICAL CENTER | Age: 74
End: 2019-05-30
Attending: NEUROLOGICAL SURGERY
Payer: MEDICARE

## 2019-05-30 DIAGNOSIS — M54.16 LUMBAR RADICULOPATHY: ICD-10-CM

## 2019-05-30 PROCEDURE — 72158 MRI LUMBAR SPINE W/O & W/DYE: CPT

## 2019-05-30 PROCEDURE — 700117 HCHG RX CONTRAST REV CODE 255: Performed by: NEUROLOGICAL SURGERY

## 2019-05-30 PROCEDURE — A9585 GADOBUTROL INJECTION: HCPCS | Performed by: NEUROLOGICAL SURGERY

## 2019-05-30 RX ORDER — GADOBUTROL 604.72 MG/ML
7.5 INJECTION INTRAVENOUS ONCE
Status: COMPLETED | OUTPATIENT
Start: 2019-05-30 | End: 2019-05-30

## 2019-05-30 RX ADMIN — GADOBUTROL 7.5 ML: 604.72 INJECTION INTRAVENOUS at 19:16

## 2019-08-29 ENCOUNTER — OFFICE VISIT (OUTPATIENT)
Dept: MEDICAL GROUP | Facility: MEDICAL CENTER | Age: 74
End: 2019-08-29
Payer: MEDICARE

## 2019-08-29 VITALS
TEMPERATURE: 97.4 F | HEART RATE: 86 BPM | WEIGHT: 182 LBS | BODY MASS INDEX: 26.96 KG/M2 | RESPIRATION RATE: 16 BRPM | SYSTOLIC BLOOD PRESSURE: 114 MMHG | OXYGEN SATURATION: 95 % | DIASTOLIC BLOOD PRESSURE: 70 MMHG | HEIGHT: 69 IN

## 2019-08-29 DIAGNOSIS — J44.9 COPD MIXED TYPE (HCC): ICD-10-CM

## 2019-08-29 DIAGNOSIS — Z00.00 MEDICARE ANNUAL WELLNESS VISIT, SUBSEQUENT: ICD-10-CM

## 2019-08-29 DIAGNOSIS — L30.9 ECZEMA, UNSPECIFIED TYPE: ICD-10-CM

## 2019-08-29 DIAGNOSIS — B02.29 POST HERPETIC NEURALGIA: ICD-10-CM

## 2019-08-29 DIAGNOSIS — F51.01 PRIMARY INSOMNIA: ICD-10-CM

## 2019-08-29 DIAGNOSIS — M54.42 CHRONIC BILATERAL LOW BACK PAIN WITH LEFT-SIDED SCIATICA: ICD-10-CM

## 2019-08-29 DIAGNOSIS — E78.5 DYSLIPIDEMIA: ICD-10-CM

## 2019-08-29 DIAGNOSIS — G89.29 CHRONIC BILATERAL LOW BACK PAIN WITH LEFT-SIDED SCIATICA: ICD-10-CM

## 2019-08-29 DIAGNOSIS — R05.9 COUGH: ICD-10-CM

## 2019-08-29 DIAGNOSIS — N52.9 ERECTILE DYSFUNCTION, UNSPECIFIED ERECTILE DYSFUNCTION TYPE: ICD-10-CM

## 2019-08-29 PROCEDURE — G0439 PPPS, SUBSEQ VISIT: HCPCS | Performed by: NURSE PRACTITIONER

## 2019-08-29 PROCEDURE — 99213 OFFICE O/P EST LOW 20 MIN: CPT | Mod: 25 | Performed by: NURSE PRACTITIONER

## 2019-08-29 RX ORDER — ATORVASTATIN CALCIUM 40 MG/1
40 TABLET, FILM COATED ORAL DAILY
Qty: 90 TAB | Refills: 3 | Status: SHIPPED | OUTPATIENT
Start: 2019-08-29 | End: 2020-09-10 | Stop reason: SDUPTHER

## 2019-08-29 RX ORDER — TIOTROPIUM BROMIDE 18 UG/1
18 CAPSULE ORAL; RESPIRATORY (INHALATION) DAILY
Qty: 90 CAP | Refills: 3 | Status: SHIPPED | OUTPATIENT
Start: 2019-08-29 | End: 2020-10-05 | Stop reason: SDUPTHER

## 2019-08-29 RX ORDER — ALBUTEROL SULFATE 90 UG/1
2 AEROSOL, METERED RESPIRATORY (INHALATION) EVERY 6 HOURS PRN
Qty: 8.5 G | Refills: 11 | Status: SHIPPED | OUTPATIENT
Start: 2019-08-29 | End: 2019-11-26 | Stop reason: SDUPTHER

## 2019-08-29 RX ORDER — SILDENAFIL 100 MG/1
100 TABLET, FILM COATED ORAL
Qty: 10 TAB | Refills: 11 | Status: ON HOLD | OUTPATIENT
Start: 2019-08-29 | End: 2019-10-09

## 2019-08-29 ASSESSMENT — ACTIVITIES OF DAILY LIVING (ADL): BATHING_REQUIRES_ASSISTANCE: 0

## 2019-08-29 ASSESSMENT — ENCOUNTER SYMPTOMS: GENERAL WELL-BEING: GOOD

## 2019-08-29 ASSESSMENT — PATIENT HEALTH QUESTIONNAIRE - PHQ9: CLINICAL INTERPRETATION OF PHQ2 SCORE: 0

## 2019-08-29 NOTE — PROGRESS NOTES
CC: Patient here for annual Medicare wellness visit as well as to discuss a cough, erectile dysfunction and insomnia.    HPI:   Bayron presents today with the following.    1. Medicare annual wellness visit, subsequent  Screening performed below.    2. Chronic bilateral low back pain with left-sided sciatica  Patient continues to follow with pain management which is prescribing oxycodone for his chronic pain.  He is also on Neurontin and occasional meloxicam.  He states these have been helpful for his low back pain with his left-sided sciatica.    3. Cough  Patient states he has had a cough for a few days as well as some wheezing.  He is on Spiriva and states normally he does not need to use his albuterol.  He denies any fever or chills.  He would like to get a chest x-ray to check on his lung status.  Vital signs in the office are normal with pulse ox at 95%     4. Primary insomnia  Patient has insomnia with much of this related to his pain for which he is going to pain management.    5. COPD mixed type (HCC)  Patient currently on Spiriva as is only preventative inhaler.  He did have a pulmonary function test done a little over a year ago which showed moderate obstructive defect suggestive of COPD and that was when he was started on Spiriva.    6. Dyslipidemia  Patient on Lipitor 40 mg last cholesterol was good    7. Erectile dysfunction, unspecified erectile dysfunction type  Patient would like to get a prescription for Viagra.  He states he has had problems with erectile dysfunction probably age and disease related.  He has used Viagra in the past would like a prescription from me.    8. Eczema, unspecified type  Patient follows with dermatology which he states is providing him with some sort of cream or shampoo for his eczema.    9. Post herpetic neuralgia  Patient continues on gabapentin for postherpetic neuralgia which he states is still present.      Depression Screening    Little interest or pleasure in  doing things?  0 - not at all  Feeling down, depressed , or hopeless? 0 - not at all  Patient Health Questionnaire Score: 0     If depressive symptoms identified deferred to follow up visit unless specifically addressed in assessment and plan.    Interpretation of PHQ-9 Total Score   Score Severity   1-4 No Depression   5-9 Mild Depression   10-14 Moderate Depression   15-19 Moderately Severe Depression   20-27 Severe Depression    Screening for Cognitive Impairment    Three Minute Recall (village, kitchen, baby) 3/3    Mike clock face with all 12 numbers and set the hands to show 10 past 10.  Yes    Cognitive concerns identified deferred for follow up unless specifically addressed in assessment and plan.    Fall Risk Assessment    Has the patient had two or more falls in the last year or any fall with injury in the last year?  No    Safety Assessment    Throw rugs on floor.  Yes  Handrails on all stairs.  Yes  Good lighting in all hallways.  Yes  Difficulty hearing.  No  Patient counseled about all safety risks that were identified.    Functional Assessment ADLs    Are there any barriers preventing you from cooking for yourself or meeting nutritional needs?  No.    Are there any barriers preventing you from driving safely or obtaining transportation?  No.    Are there any barriers preventing you from using a telephone or calling for help?  No.    Are there any barriers preventing you from shopping?  No.    Are there any barriers preventing you from taking care of your own finances?  No.    Are there any barriers preventing you from managing your medications?  No.    Are there any barriers preventing you from showering, bathing or dressing yourself?  No.    Are you currently engaging in any exercise or physical activity?  Yes.     What is your perception of your health?  Good.      Health Maintenance Summary                Annual Wellness Visit Overdue 1/11/2019      Done 1/10/2018 Visit Dx: Medicare annual wellness  visit, subsequent     Patient has more history with this topic...    PFT SCREENING-FEV1 AND FEV/FVC RATIO / SPIROMETRY SHOULD BE PERFORMED ANNUALLY Overdue 7/30/2019      Done 7/30/2018 PULMONARY FUNCTION TESTS    IMM INFLUENZA Next Due 9/1/2019      Done 10/3/2018 Imm Admin: Influenza Vaccine Adult HD     Patient has more history with this topic...    IMM DTaP/Tdap/Td Vaccine Next Due 8/19/2023      Done 8/19/2013 Imm Admin: Tdap Vaccine     Patient has more history with this topic...          Patient Care Team:  DALLAS Elam as PCP - General (Family Medicine)  Jaret Saunders M.D. (Anesthesia)          Patient Active Problem List    Diagnosis Date Noted   • Post herpetic neuralgia 05/23/2018     Priority: Medium   • COPD mixed type (HCC) 08/06/2018   • Erectile dysfunction 11/27/2017   • Chronic bilateral low back pain with left-sided sciatica 12/08/2016   • Eczema 12/08/2016   • Primary insomnia 12/08/2016       Current Outpatient Medications   Medication Sig Dispense Refill   • tiotropium (SPIRIVA) 18 MCG Cap Inhale 1 Cap by mouth every day. 90 Cap 3   • atorvastatin (LIPITOR) 40 MG Tab Take 1 Tab by mouth every day. 90 Tab 3   • albuterol 108 (90 Base) MCG/ACT Aero Soln inhalation aerosol Inhale 2 Puffs by mouth every 6 hours as needed. 8.5 g 11   • sildenafil citrate (VIAGRA) 100 MG tablet Take 1 Tab by mouth 1 time daily as needed for Erectile Dysfunction. 10 Tab 11   • meloxicam (MOBIC) 7.5 MG Tab Take 1 Tab by mouth every morning with breakfast. 30 Tab 0   • tizanidine (ZANAFLEX) 4 MG Tab Take 1 Tab by mouth every 6 hours as needed. 30 Tab 0   • gabapentin (NEURONTIN) 300 MG Cap Take 1 Cap by mouth 3 times a day. 90 Cap 1   • ketoconazole (NIZORAL) 2 % shampoo Apply 1 Each to affected area(s) 1 time daily as needed.     • multivitamin (THERAGRAN) Tab Take 1 Tab by mouth every day.     • clonidine (CATAPRES) 0.1 MG Tab Take 0.1 mg by mouth 2 times a day as needed.  2     No current  "facility-administered medications for this visit.          Allergies as of 08/29/2019   • (No Known Allergies)        ROS: As per HPI.    /70 (BP Location: Left arm, Patient Position: Sitting, BP Cuff Size: Adult)   Pulse 86   Temp 36.3 °C (97.4 °F) (Temporal)   Resp 16   Ht 1.753 m (5' 9\")   Wt 82.6 kg (182 lb)   SpO2 95%   BMI 26.88 kg/m²     Physical Exam:  Gen:         Alert and oriented, No apparent distress.  Scaly areas of scalp  Neck:        No Lymphadenopathy or Bruits.  Lungs:     Expiratory wheeze noted  CV:          Regular rate and rhythm. No murmurs, rubs or gallops.  Abd:         Soft non tender, non distended. Normal active bowel sounds.  No  Hepatosplenomegaly, No pulsatile masses.                   Ext:          No clubbing, cyanosis, edema.      Assessment and Plan.   74 y.o. male with the following issues.    1. Medicare annual wellness visit, subsequent  Annual wellness topics discussed, review of chronic medical problems completed    - Subsequent Annual Wellness Visit - Includes PPPS ()    2. Chronic bilateral low back pain with left-sided sciatica  Patient will continue on opiates from pain management as well as his gabapentin and occasional tizanidine.  I recommended against the use of meloxicam on a regular basis because of the potential for gastric ulcer and kidney disease and his age.    3. Cough  I advised a chest x-ray check on the status of his lungs as well as a pulmonary function test and will contact him regarding results.  - DX-CHEST-2 VIEWS; Future  - PULMONARY FUNCTION TESTS -Test requested: Complete Pulmonary Function Test; Future    4. Primary insomnia  Patient on medication for pain which helps with sleep.    5. COPD mixed type (HCC)  I will recheck his COPD status since it is been 1 year since his last pulmonary function test.  He feels the Spiriva is working well so I will refill that today but he may need additional medicine in the future.  - Comp Metabolic " Panel; Future  - CBC WITHOUT DIFFERENTIAL; Future  - tiotropium (SPIRIVA) 18 MCG Cap; Inhale 1 Cap by mouth every day.  Dispense: 90 Cap; Refill: 3  - PULMONARY FUNCTION TESTS -Test requested: Complete Pulmonary Function Test; Future    6. Dyslipidemia  Cholesterol well controlled on statin.  - Comp Metabolic Panel; Future  - Lipid Profile; Future  - atorvastatin (LIPITOR) 40 MG Tab; Take 1 Tab by mouth every day.  Dispense: 90 Tab; Refill: 3    7. Erectile dysfunction, unspecified erectile dysfunction type  Patient given warnings regarding Viagra and he is going to break these in half and he knows not to use a more than 1 a day.  - sildenafil citrate (VIAGRA) 100 MG tablet; Take 1 Tab by mouth 1 time daily as needed for Erectile Dysfunction.  Dispense: 10 Tab; Refill: 11    8. Eczema, unspecified type  Patient to continue with dermatology which is prescribing his medicines.    9. Post herpetic neuralgia  Patient will continue with gabapentin for the neuralgia pain.

## 2019-08-30 ENCOUNTER — APPOINTMENT (RX ONLY)
Dept: URBAN - METROPOLITAN AREA CLINIC 20 | Facility: CLINIC | Age: 74
Setting detail: DERMATOLOGY
End: 2019-08-30

## 2019-08-30 DIAGNOSIS — D18.0 HEMANGIOMA: ICD-10-CM

## 2019-08-30 DIAGNOSIS — L21.8 OTHER SEBORRHEIC DERMATITIS: ICD-10-CM

## 2019-08-30 DIAGNOSIS — Z71.89 OTHER SPECIFIED COUNSELING: ICD-10-CM

## 2019-08-30 DIAGNOSIS — L82.1 OTHER SEBORRHEIC KERATOSIS: ICD-10-CM

## 2019-08-30 DIAGNOSIS — D22 MELANOCYTIC NEVI: ICD-10-CM

## 2019-08-30 DIAGNOSIS — L57.0 ACTINIC KERATOSIS: ICD-10-CM

## 2019-08-30 DIAGNOSIS — L81.4 OTHER MELANIN HYPERPIGMENTATION: ICD-10-CM

## 2019-08-30 PROBLEM — D22.5 MELANOCYTIC NEVI OF TRUNK: Status: ACTIVE | Noted: 2019-08-30

## 2019-08-30 PROBLEM — D22.61 MELANOCYTIC NEVI OF RIGHT UPPER LIMB, INCLUDING SHOULDER: Status: ACTIVE | Noted: 2019-08-30

## 2019-08-30 PROBLEM — D18.01 HEMANGIOMA OF SKIN AND SUBCUTANEOUS TISSUE: Status: ACTIVE | Noted: 2019-08-30

## 2019-08-30 PROBLEM — D22.62 MELANOCYTIC NEVI OF LEFT UPPER LIMB, INCLUDING SHOULDER: Status: ACTIVE | Noted: 2019-08-30

## 2019-08-30 PROCEDURE — 17000 DESTRUCT PREMALG LESION: CPT

## 2019-08-30 PROCEDURE — 17003 DESTRUCT PREMALG LES 2-14: CPT

## 2019-08-30 PROCEDURE — ? SUNSCREEN RECOMMENDATIONS

## 2019-08-30 PROCEDURE — ? COUNSELING

## 2019-08-30 PROCEDURE — ? LIQUID NITROGEN

## 2019-08-30 PROCEDURE — 99214 OFFICE O/P EST MOD 30 MIN: CPT | Mod: 25

## 2019-08-30 PROCEDURE — ? PRESCRIPTION

## 2019-08-30 RX ORDER — FLUOCINONIDE 0.5 MG/ML
SOLUTION TOPICAL
Qty: 1 | Refills: 6 | Status: ERX

## 2019-08-30 RX ORDER — KETOCONAZOLE 20 MG/G
CREAM TOPICAL BID
Qty: 1 | Refills: 2 | Status: ERX

## 2019-08-30 RX ORDER — KETOCONAZOLE 20.5 MG/ML
SHAMPOO, SUSPENSION TOPICAL BIW
Qty: 1 | Refills: 6 | Status: ERX

## 2019-08-30 ASSESSMENT — LOCATION DETAILED DESCRIPTION DERM
LOCATION DETAILED: LEFT ANTERIOR LATERAL DISTAL UPPER ARM
LOCATION DETAILED: LEFT CENTRAL EYEBROW
LOCATION DETAILED: LEFT VENTRAL LATERAL PROXIMAL FOREARM
LOCATION DETAILED: RIGHT PROXIMAL DORSAL FOREARM
LOCATION DETAILED: RIGHT MEDIAL UPPER BACK
LOCATION DETAILED: RIGHT CENTRAL EYEBROW
LOCATION DETAILED: LEFT VENTRAL PROXIMAL FOREARM
LOCATION DETAILED: RIGHT INFERIOR MEDIAL FOREHEAD
LOCATION DETAILED: LEFT PROXIMAL DORSAL FOREARM
LOCATION DETAILED: RIGHT VENTRAL DISTAL FOREARM
LOCATION DETAILED: LEFT VENTRAL DISTAL FOREARM
LOCATION DETAILED: INFERIOR THORACIC SPINE
LOCATION DETAILED: LEFT SUPERIOR FOREHEAD
LOCATION DETAILED: RIGHT SUPERIOR FOREHEAD
LOCATION DETAILED: POSTERIOR MID-PARIETAL SCALP
LOCATION DETAILED: LEFT LATERAL EYEBROW
LOCATION DETAILED: LEFT MEDIAL FOREHEAD
LOCATION DETAILED: RIGHT RADIAL DORSAL HAND
LOCATION DETAILED: SUPERIOR THORACIC SPINE
LOCATION DETAILED: LEFT RADIAL DORSAL HAND
LOCATION DETAILED: RIGHT VENTRAL PROXIMAL FOREARM
LOCATION DETAILED: RIGHT INFERIOR UPPER BACK
LOCATION DETAILED: RIGHT FOREHEAD

## 2019-08-30 ASSESSMENT — LOCATION ZONE DERM
LOCATION ZONE: TRUNK
LOCATION ZONE: SCALP
LOCATION ZONE: HAND
LOCATION ZONE: FACE
LOCATION ZONE: ARM

## 2019-08-30 ASSESSMENT — LOCATION SIMPLE DESCRIPTION DERM
LOCATION SIMPLE: RIGHT UPPER BACK
LOCATION SIMPLE: RIGHT FOREARM
LOCATION SIMPLE: LEFT UPPER ARM
LOCATION SIMPLE: LEFT EYEBROW
LOCATION SIMPLE: LEFT HAND
LOCATION SIMPLE: RIGHT EYEBROW
LOCATION SIMPLE: UPPER BACK
LOCATION SIMPLE: RIGHT FOREHEAD
LOCATION SIMPLE: LEFT FOREARM
LOCATION SIMPLE: POSTERIOR SCALP
LOCATION SIMPLE: RIGHT HAND
LOCATION SIMPLE: LEFT FOREHEAD

## 2019-08-30 NOTE — PROCEDURE: LIQUID NITROGEN
Render Note In Bullet Format When Appropriate: No
Detail Level: Detailed
Post-Care Instructions: I reviewed with the patient in detail post-care instructions. Patient is to wear sunprotection, and avoid picking at any of the treated lesions. Pt may apply Vaseline to crusted or scabbing areas.
Duration Of Freeze Thaw-Cycle (Seconds): 10
Render Post-Care Instructions In Note?: yes
Consent: The patient's consent was obtained including but not limited to risks of crusting, scabbing, blistering, scarring, darker or lighter pigmentary change, recurrence, incomplete removal and infection. RTC in 2 months if lesion(s) persistent.
Number Of Freeze-Thaw Cycles: 2 freeze-thaw cycles

## 2019-09-25 ENCOUNTER — HOSPITAL ENCOUNTER (OUTPATIENT)
Dept: RADIOLOGY | Facility: MEDICAL CENTER | Age: 74
DRG: 455 | End: 2019-09-25
Attending: NEUROLOGICAL SURGERY
Payer: MEDICARE

## 2019-09-25 DIAGNOSIS — Z01.811 PRE-OPERATIVE RESPIRATORY EXAMINATION: ICD-10-CM

## 2019-09-25 DIAGNOSIS — Z01.812 PRE-OPERATIVE LABORATORY EXAMINATION: ICD-10-CM

## 2019-09-25 DIAGNOSIS — Z01.810 PRE-OPERATIVE CARDIOVASCULAR EXAMINATION: ICD-10-CM

## 2019-09-25 LAB
ANION GAP SERPL CALC-SCNC: 6 MMOL/L (ref 0–11.9)
APTT PPP: 28.9 SEC (ref 24.7–36)
BASOPHILS # BLD AUTO: 1.4 % (ref 0–1.8)
BASOPHILS # BLD: 0.16 K/UL (ref 0–0.12)
BUN SERPL-MCNC: 19 MG/DL (ref 8–22)
CALCIUM SERPL-MCNC: 10.4 MG/DL (ref 8.5–10.5)
CHLORIDE SERPL-SCNC: 102 MMOL/L (ref 96–112)
CO2 SERPL-SCNC: 29 MMOL/L (ref 20–33)
CREAT SERPL-MCNC: 0.95 MG/DL (ref 0.5–1.4)
EKG IMPRESSION: NORMAL
EOSINOPHIL # BLD AUTO: 0.23 K/UL (ref 0–0.51)
EOSINOPHIL NFR BLD: 1.9 % (ref 0–6.9)
ERYTHROCYTE [DISTWIDTH] IN BLOOD BY AUTOMATED COUNT: 45.6 FL (ref 35.9–50)
GLUCOSE SERPL-MCNC: 92 MG/DL (ref 65–99)
HCT VFR BLD AUTO: 45.7 % (ref 42–52)
HGB BLD-MCNC: 14.5 G/DL (ref 14–18)
IMM GRANULOCYTES # BLD AUTO: 0.04 K/UL (ref 0–0.11)
IMM GRANULOCYTES NFR BLD AUTO: 0.3 % (ref 0–0.9)
INR PPP: 0.97 (ref 0.87–1.13)
LYMPHOCYTES # BLD AUTO: 3.09 K/UL (ref 1–4.8)
LYMPHOCYTES NFR BLD: 26.2 % (ref 22–41)
MCH RBC QN AUTO: 31 PG (ref 27–33)
MCHC RBC AUTO-ENTMCNC: 31.7 G/DL (ref 33.7–35.3)
MCV RBC AUTO: 97.6 FL (ref 81.4–97.8)
MONOCYTES # BLD AUTO: 1.27 K/UL (ref 0–0.85)
MONOCYTES NFR BLD AUTO: 10.8 % (ref 0–13.4)
NEUTROPHILS # BLD AUTO: 7.01 K/UL (ref 1.82–7.42)
NEUTROPHILS NFR BLD: 59.4 % (ref 44–72)
NRBC # BLD AUTO: 0 K/UL
NRBC BLD-RTO: 0 /100 WBC
PLATELET # BLD AUTO: 254 K/UL (ref 164–446)
PMV BLD AUTO: 11 FL (ref 9–12.9)
POTASSIUM SERPL-SCNC: 4.7 MMOL/L (ref 3.6–5.5)
PROTHROMBIN TIME: 13.1 SEC (ref 12–14.6)
RBC # BLD AUTO: 4.68 M/UL (ref 4.7–6.1)
SODIUM SERPL-SCNC: 137 MMOL/L (ref 135–145)
WBC # BLD AUTO: 11.8 K/UL (ref 4.8–10.8)

## 2019-09-25 PROCEDURE — 36415 COLL VENOUS BLD VENIPUNCTURE: CPT

## 2019-09-25 PROCEDURE — 85610 PROTHROMBIN TIME: CPT

## 2019-09-25 PROCEDURE — 80048 BASIC METABOLIC PNL TOTAL CA: CPT

## 2019-09-25 PROCEDURE — 93005 ELECTROCARDIOGRAM TRACING: CPT | Performed by: NEUROLOGICAL SURGERY

## 2019-09-25 PROCEDURE — 71046 X-RAY EXAM CHEST 2 VIEWS: CPT

## 2019-09-25 PROCEDURE — 85730 THROMBOPLASTIN TIME PARTIAL: CPT

## 2019-09-25 PROCEDURE — 93010 ELECTROCARDIOGRAM REPORT: CPT | Performed by: INTERNAL MEDICINE

## 2019-09-25 PROCEDURE — 85025 COMPLETE CBC W/AUTO DIFF WBC: CPT

## 2019-09-25 RX ORDER — OXYCODONE AND ACETAMINOPHEN 10; 325 MG/1; MG/1
1 TABLET ORAL EVERY 4 HOURS PRN
Status: ON HOLD | COMMUNITY
End: 2019-10-10

## 2019-09-25 RX ORDER — CLONIDINE HYDROCHLORIDE 0.1 MG/1
0.1 TABLET ORAL 2 TIMES DAILY
COMMUNITY
End: 2021-08-30

## 2019-10-01 ENCOUNTER — HOSPITAL ENCOUNTER (OUTPATIENT)
Dept: LAB | Facility: MEDICAL CENTER | Age: 74
End: 2019-10-01
Attending: NURSE PRACTITIONER
Payer: MEDICARE

## 2019-10-01 DIAGNOSIS — J44.9 COPD MIXED TYPE (HCC): ICD-10-CM

## 2019-10-01 DIAGNOSIS — E78.5 DYSLIPIDEMIA: ICD-10-CM

## 2019-10-01 LAB
ALBUMIN SERPL BCP-MCNC: 4.5 G/DL (ref 3.2–4.9)
ALBUMIN/GLOB SERPL: 1.6 G/DL
ALP SERPL-CCNC: 76 U/L (ref 30–99)
ALT SERPL-CCNC: 17 U/L (ref 2–50)
ANION GAP SERPL CALC-SCNC: 8 MMOL/L (ref 0–11.9)
AST SERPL-CCNC: 17 U/L (ref 12–45)
BILIRUB SERPL-MCNC: 0.5 MG/DL (ref 0.1–1.5)
BUN SERPL-MCNC: 19 MG/DL (ref 8–22)
CALCIUM SERPL-MCNC: 9.7 MG/DL (ref 8.5–10.5)
CHLORIDE SERPL-SCNC: 103 MMOL/L (ref 96–112)
CHOLEST SERPL-MCNC: 157 MG/DL (ref 100–199)
CO2 SERPL-SCNC: 27 MMOL/L (ref 20–33)
CREAT SERPL-MCNC: 0.97 MG/DL (ref 0.5–1.4)
ERYTHROCYTE [DISTWIDTH] IN BLOOD BY AUTOMATED COUNT: 45.9 FL (ref 35.9–50)
GLOBULIN SER CALC-MCNC: 2.8 G/DL (ref 1.9–3.5)
GLUCOSE SERPL-MCNC: 92 MG/DL (ref 65–99)
HCT VFR BLD AUTO: 44.1 % (ref 42–52)
HDLC SERPL-MCNC: 40 MG/DL
HGB BLD-MCNC: 14.5 G/DL (ref 14–18)
LDLC SERPL CALC-MCNC: 93 MG/DL
MCH RBC QN AUTO: 32.1 PG (ref 27–33)
MCHC RBC AUTO-ENTMCNC: 32.9 G/DL (ref 33.7–35.3)
MCV RBC AUTO: 97.6 FL (ref 81.4–97.8)
PLATELET # BLD AUTO: 251 K/UL (ref 164–446)
PMV BLD AUTO: 11.4 FL (ref 9–12.9)
POTASSIUM SERPL-SCNC: 4.4 MMOL/L (ref 3.6–5.5)
PROT SERPL-MCNC: 7.3 G/DL (ref 6–8.2)
RBC # BLD AUTO: 4.52 M/UL (ref 4.7–6.1)
SODIUM SERPL-SCNC: 138 MMOL/L (ref 135–145)
TRIGL SERPL-MCNC: 122 MG/DL (ref 0–149)
WBC # BLD AUTO: 9.3 K/UL (ref 4.8–10.8)

## 2019-10-01 PROCEDURE — 80053 COMPREHEN METABOLIC PANEL: CPT

## 2019-10-01 PROCEDURE — 80061 LIPID PANEL: CPT

## 2019-10-01 PROCEDURE — 85027 COMPLETE CBC AUTOMATED: CPT

## 2019-10-01 PROCEDURE — 36415 COLL VENOUS BLD VENIPUNCTURE: CPT

## 2019-10-09 ENCOUNTER — ANESTHESIA EVENT (OUTPATIENT)
Dept: SURGERY | Facility: MEDICAL CENTER | Age: 74
DRG: 455 | End: 2019-10-09
Payer: MEDICARE

## 2019-10-09 ENCOUNTER — APPOINTMENT (OUTPATIENT)
Dept: RADIOLOGY | Facility: MEDICAL CENTER | Age: 74
DRG: 455 | End: 2019-10-09
Attending: NEUROLOGICAL SURGERY
Payer: MEDICARE

## 2019-10-09 ENCOUNTER — ANESTHESIA (OUTPATIENT)
Dept: SURGERY | Facility: MEDICAL CENTER | Age: 74
DRG: 455 | End: 2019-10-09
Payer: MEDICARE

## 2019-10-09 ENCOUNTER — HOSPITAL ENCOUNTER (INPATIENT)
Facility: MEDICAL CENTER | Age: 74
LOS: 2 days | DRG: 455 | End: 2019-10-11
Attending: NEUROLOGICAL SURGERY | Admitting: NEUROLOGICAL SURGERY
Payer: MEDICARE

## 2019-10-09 DIAGNOSIS — G89.29 CHRONIC BILATERAL LOW BACK PAIN WITH LEFT-SIDED SCIATICA: ICD-10-CM

## 2019-10-09 DIAGNOSIS — M54.42 CHRONIC BILATERAL LOW BACK PAIN WITH LEFT-SIDED SCIATICA: ICD-10-CM

## 2019-10-09 PROCEDURE — 110371 HCHG SHELL REV 272: Performed by: NEUROLOGICAL SURGERY

## 2019-10-09 PROCEDURE — 160042 HCHG SURGERY MINUTES - EA ADDL 1 MIN LEVEL 5: Performed by: NEUROLOGICAL SURGERY

## 2019-10-09 PROCEDURE — 700111 HCHG RX REV CODE 636 W/ 250 OVERRIDE (IP)

## 2019-10-09 PROCEDURE — 700101 HCHG RX REV CODE 250: Performed by: ANESTHESIOLOGY

## 2019-10-09 PROCEDURE — 700105 HCHG RX REV CODE 258: Performed by: NEUROLOGICAL SURGERY

## 2019-10-09 PROCEDURE — 700102 HCHG RX REV CODE 250 W/ 637 OVERRIDE(OP): Performed by: NURSE PRACTITIONER

## 2019-10-09 PROCEDURE — 01NB0ZZ RELEASE LUMBAR NERVE, OPEN APPROACH: ICD-10-PCS | Performed by: NEUROLOGICAL SURGERY

## 2019-10-09 PROCEDURE — 07DR3ZZ EXTRACTION OF ILIAC BONE MARROW, PERCUTANEOUS APPROACH: ICD-10-PCS | Performed by: NEUROLOGICAL SURGERY

## 2019-10-09 PROCEDURE — 0SB20ZZ EXCISION OF LUMBAR VERTEBRAL DISC, OPEN APPROACH: ICD-10-PCS | Performed by: NEUROLOGICAL SURGERY

## 2019-10-09 PROCEDURE — 72100 X-RAY EXAM L-S SPINE 2/3 VWS: CPT

## 2019-10-09 PROCEDURE — 160035 HCHG PACU - 1ST 60 MINS PHASE I: Performed by: NEUROLOGICAL SURGERY

## 2019-10-09 PROCEDURE — 0SG00AJ FUSION OF LUMBAR VERTEBRAL JOINT WITH INTERBODY FUSION DEVICE, POSTERIOR APPROACH, ANTERIOR COLUMN, OPEN APPROACH: ICD-10-PCS | Performed by: NEUROLOGICAL SURGERY

## 2019-10-09 PROCEDURE — A9270 NON-COVERED ITEM OR SERVICE: HCPCS | Performed by: NURSE PRACTITIONER

## 2019-10-09 PROCEDURE — 700102 HCHG RX REV CODE 250 W/ 637 OVERRIDE(OP): Performed by: NEUROLOGICAL SURGERY

## 2019-10-09 PROCEDURE — 110454 HCHG SHELL REV 250: Performed by: NEUROLOGICAL SURGERY

## 2019-10-09 PROCEDURE — 160002 HCHG RECOVERY MINUTES (STAT): Performed by: NEUROLOGICAL SURGERY

## 2019-10-09 PROCEDURE — 160009 HCHG ANES TIME/MIN: Performed by: NEUROLOGICAL SURGERY

## 2019-10-09 PROCEDURE — A4314 CATH W/DRAINAGE 2-WAY LATEX: HCPCS | Performed by: NEUROLOGICAL SURGERY

## 2019-10-09 PROCEDURE — 700111 HCHG RX REV CODE 636 W/ 250 OVERRIDE (IP): Performed by: NURSE PRACTITIONER

## 2019-10-09 PROCEDURE — C1713 ANCHOR/SCREW BN/BN,TIS/BN: HCPCS | Performed by: NEUROLOGICAL SURGERY

## 2019-10-09 PROCEDURE — 160048 HCHG OR STATISTICAL LEVEL 1-5: Performed by: NEUROLOGICAL SURGERY

## 2019-10-09 PROCEDURE — 01NR0ZZ RELEASE SACRAL NERVE, OPEN APPROACH: ICD-10-PCS | Performed by: NEUROLOGICAL SURGERY

## 2019-10-09 PROCEDURE — 502240 HCHG MISC OR SUPPLY RC 0272: Performed by: NEUROLOGICAL SURGERY

## 2019-10-09 PROCEDURE — 500885 HCHG PACK, JACKSON TABLE: Performed by: NEUROLOGICAL SURGERY

## 2019-10-09 PROCEDURE — L8699 PROSTHETIC IMPLANT NOS: HCPCS | Performed by: NEUROLOGICAL SURGERY

## 2019-10-09 PROCEDURE — 0SG0071 FUSION OF LUMBAR VERTEBRAL JOINT WITH AUTOLOGOUS TISSUE SUBSTITUTE, POSTERIOR APPROACH, POSTERIOR COLUMN, OPEN APPROACH: ICD-10-PCS | Performed by: NEUROLOGICAL SURGERY

## 2019-10-09 PROCEDURE — 700102 HCHG RX REV CODE 250 W/ 637 OVERRIDE(OP): Performed by: ANESTHESIOLOGY

## 2019-10-09 PROCEDURE — 700112 HCHG RX REV CODE 229: Performed by: NURSE PRACTITIONER

## 2019-10-09 PROCEDURE — 700111 HCHG RX REV CODE 636 W/ 250 OVERRIDE (IP): Performed by: NEUROLOGICAL SURGERY

## 2019-10-09 PROCEDURE — 700111 HCHG RX REV CODE 636 W/ 250 OVERRIDE (IP): Performed by: ANESTHESIOLOGY

## 2019-10-09 PROCEDURE — 160031 HCHG SURGERY MINUTES - 1ST 30 MINS LEVEL 5: Performed by: NEUROLOGICAL SURGERY

## 2019-10-09 PROCEDURE — 160036 HCHG PACU - EA ADDL 30 MINS PHASE I: Performed by: NEUROLOGICAL SURGERY

## 2019-10-09 PROCEDURE — 700101 HCHG RX REV CODE 250: Performed by: NURSE PRACTITIONER

## 2019-10-09 PROCEDURE — 700101 HCHG RX REV CODE 250: Performed by: NEUROLOGICAL SURGERY

## 2019-10-09 PROCEDURE — A9270 NON-COVERED ITEM OR SERVICE: HCPCS | Performed by: ANESTHESIOLOGY

## 2019-10-09 PROCEDURE — 302128 INFUSION PUMP: Performed by: NEUROLOGICAL SURGERY

## 2019-10-09 PROCEDURE — 500367 HCHG DRAIN KIT, HEMOVAC: Performed by: NEUROLOGICAL SURGERY

## 2019-10-09 PROCEDURE — A9270 NON-COVERED ITEM OR SERVICE: HCPCS | Performed by: NEUROLOGICAL SURGERY

## 2019-10-09 PROCEDURE — 501838 HCHG SUTURE GENERAL: Performed by: NEUROLOGICAL SURGERY

## 2019-10-09 PROCEDURE — 770001 HCHG ROOM/CARE - MED/SURG/GYN PRIV*

## 2019-10-09 DEVICE — CROSSLINK TSRH 5.5 MULTI 1.75 - (3TX2=6) 1.75-2.15: Type: IMPLANTABLE DEVICE | Site: SPINE LUMBAR | Status: FUNCTIONAL

## 2019-10-09 DEVICE — SCREW MAS  SOLERA 6.5 X 50MM (1TCX16+3TCX8=40): Type: IMPLANTABLE DEVICE | Site: SPINE LUMBAR | Status: FUNCTIONAL

## 2019-10-09 DEVICE — MATRIX MASTERGRAFT 10CC: Type: IMPLANTABLE DEVICE | Site: SPINE LUMBAR | Status: FUNCTIONAL

## 2019-10-09 DEVICE — CAGE EXPANDABLE ELEVATE STANDARD 10X28: Type: IMPLANTABLE DEVICE | Site: SPINE LUMBAR | Status: FUNCTIONAL

## 2019-10-09 DEVICE — ROD PREBENT TITANIUM 5.5 X 35MM (2TCONX2=4): Type: IMPLANTABLE DEVICE | Site: SPINE LUMBAR | Status: FUNCTIONAL

## 2019-10-09 DEVICE — SCREW SOLERA SET SCREW (1TCX40+3TCX21+2TCX10=123): Type: IMPLANTABLE DEVICE | Site: SPINE LUMBAR | Status: FUNCTIONAL

## 2019-10-09 RX ORDER — CLONIDINE HYDROCHLORIDE 0.1 MG/1
0.1 TABLET ORAL 2 TIMES DAILY
Status: DISCONTINUED | OUTPATIENT
Start: 2019-10-09 | End: 2019-10-11 | Stop reason: HOSPADM

## 2019-10-09 RX ORDER — ATORVASTATIN CALCIUM 40 MG/1
40 TABLET, FILM COATED ORAL DAILY
Status: DISCONTINUED | OUTPATIENT
Start: 2019-10-10 | End: 2019-10-11 | Stop reason: HOSPADM

## 2019-10-09 RX ORDER — MULTIVIT WITH MINERALS/LUTEIN
1000 TABLET ORAL EVERY MORNING
Status: ON HOLD | COMMUNITY
End: 2019-10-10

## 2019-10-09 RX ORDER — METHOCARBAMOL 750 MG/1
750 TABLET, FILM COATED ORAL EVERY 8 HOURS PRN
Status: DISCONTINUED | OUTPATIENT
Start: 2019-10-09 | End: 2019-10-11 | Stop reason: HOSPADM

## 2019-10-09 RX ORDER — DEXAMETHASONE SODIUM PHOSPHATE 4 MG/ML
INJECTION, SOLUTION INTRA-ARTICULAR; INTRALESIONAL; INTRAMUSCULAR; INTRAVENOUS; SOFT TISSUE PRN
Status: DISCONTINUED | OUTPATIENT
Start: 2019-10-09 | End: 2019-10-09 | Stop reason: SURG

## 2019-10-09 RX ORDER — SODIUM CHLORIDE, SODIUM LACTATE, POTASSIUM CHLORIDE, CALCIUM CHLORIDE 600; 310; 30; 20 MG/100ML; MG/100ML; MG/100ML; MG/100ML
INJECTION, SOLUTION INTRAVENOUS CONTINUOUS
Status: ACTIVE | OUTPATIENT
Start: 2019-10-09 | End: 2019-10-10

## 2019-10-09 RX ORDER — HEPARIN SODIUM,PORCINE 1000/ML
VIAL (ML) INJECTION
Status: DISCONTINUED | OUTPATIENT
Start: 2019-10-09 | End: 2019-10-09 | Stop reason: HOSPADM

## 2019-10-09 RX ORDER — LABETALOL HYDROCHLORIDE 5 MG/ML
5 INJECTION, SOLUTION INTRAVENOUS
Status: DISCONTINUED | OUTPATIENT
Start: 2019-10-09 | End: 2019-10-09 | Stop reason: HOSPADM

## 2019-10-09 RX ORDER — LIDOCAINE HYDROCHLORIDE 10 MG/ML
INJECTION, SOLUTION EPIDURAL; INFILTRATION; INTRACAUDAL; PERINEURAL
Status: COMPLETED
Start: 2019-10-09 | End: 2019-10-09

## 2019-10-09 RX ORDER — LABETALOL HYDROCHLORIDE 5 MG/ML
10 INJECTION, SOLUTION INTRAVENOUS
Status: DISCONTINUED | OUTPATIENT
Start: 2019-10-09 | End: 2019-10-11 | Stop reason: HOSPADM

## 2019-10-09 RX ORDER — HALOPERIDOL 5 MG/ML
1 INJECTION INTRAMUSCULAR
Status: DISCONTINUED | OUTPATIENT
Start: 2019-10-09 | End: 2019-10-09 | Stop reason: HOSPADM

## 2019-10-09 RX ORDER — ALBUTEROL SULFATE 90 UG/1
2 AEROSOL, METERED RESPIRATORY (INHALATION) EVERY 4 HOURS PRN
Status: DISCONTINUED | OUTPATIENT
Start: 2019-10-09 | End: 2019-10-11 | Stop reason: HOSPADM

## 2019-10-09 RX ORDER — DIPHENHYDRAMINE HYDROCHLORIDE 50 MG/ML
25 INJECTION INTRAMUSCULAR; INTRAVENOUS EVERY 6 HOURS PRN
Status: DISCONTINUED | OUTPATIENT
Start: 2019-10-09 | End: 2019-10-11 | Stop reason: HOSPADM

## 2019-10-09 RX ORDER — ONDANSETRON 2 MG/ML
4 INJECTION INTRAMUSCULAR; INTRAVENOUS EVERY 4 HOURS PRN
Status: DISCONTINUED | OUTPATIENT
Start: 2019-10-09 | End: 2019-10-11 | Stop reason: HOSPADM

## 2019-10-09 RX ORDER — ONDANSETRON 2 MG/ML
4 INJECTION INTRAMUSCULAR; INTRAVENOUS
Status: DISCONTINUED | OUTPATIENT
Start: 2019-10-09 | End: 2019-10-09 | Stop reason: HOSPADM

## 2019-10-09 RX ORDER — HYDROMORPHONE HYDROCHLORIDE 1 MG/ML
0.1 INJECTION, SOLUTION INTRAMUSCULAR; INTRAVENOUS; SUBCUTANEOUS
Status: DISCONTINUED | OUTPATIENT
Start: 2019-10-09 | End: 2019-10-09 | Stop reason: HOSPADM

## 2019-10-09 RX ORDER — BISACODYL 10 MG
10 SUPPOSITORY, RECTAL RECTAL
Status: DISCONTINUED | OUTPATIENT
Start: 2019-10-09 | End: 2019-10-11 | Stop reason: HOSPADM

## 2019-10-09 RX ORDER — HYDROMORPHONE HYDROCHLORIDE 1 MG/ML
0.2 INJECTION, SOLUTION INTRAMUSCULAR; INTRAVENOUS; SUBCUTANEOUS
Status: DISCONTINUED | OUTPATIENT
Start: 2019-10-09 | End: 2019-10-09 | Stop reason: HOSPADM

## 2019-10-09 RX ORDER — OXYCODONE HCL 5 MG/5 ML
10 SOLUTION, ORAL ORAL
Status: COMPLETED | OUTPATIENT
Start: 2019-10-09 | End: 2019-10-09

## 2019-10-09 RX ORDER — LIDOCAINE HYDROCHLORIDE 20 MG/ML
INJECTION, SOLUTION EPIDURAL; INFILTRATION; INTRACAUDAL; PERINEURAL PRN
Status: DISCONTINUED | OUTPATIENT
Start: 2019-10-09 | End: 2019-10-09 | Stop reason: SURG

## 2019-10-09 RX ORDER — SODIUM CHLORIDE AND POTASSIUM CHLORIDE 150; 900 MG/100ML; MG/100ML
INJECTION, SOLUTION INTRAVENOUS CONTINUOUS
Status: DISCONTINUED | OUTPATIENT
Start: 2019-10-09 | End: 2019-10-10

## 2019-10-09 RX ORDER — ROCURONIUM BROMIDE 10 MG/ML
INJECTION, SOLUTION INTRAVENOUS PRN
Status: DISCONTINUED | OUTPATIENT
Start: 2019-10-09 | End: 2019-10-09 | Stop reason: SURG

## 2019-10-09 RX ORDER — DIAZEPAM 2 MG/1
5 TABLET ORAL EVERY 4 HOURS PRN
Status: DISCONTINUED | OUTPATIENT
Start: 2019-10-09 | End: 2019-10-11 | Stop reason: HOSPADM

## 2019-10-09 RX ORDER — AMOXICILLIN 250 MG
1 CAPSULE ORAL
Status: DISCONTINUED | OUTPATIENT
Start: 2019-10-09 | End: 2019-10-11 | Stop reason: HOSPADM

## 2019-10-09 RX ORDER — ONDANSETRON 2 MG/ML
INJECTION INTRAMUSCULAR; INTRAVENOUS PRN
Status: DISCONTINUED | OUTPATIENT
Start: 2019-10-09 | End: 2019-10-09 | Stop reason: SURG

## 2019-10-09 RX ORDER — ONDANSETRON 4 MG/1
4 TABLET, ORALLY DISINTEGRATING ORAL EVERY 4 HOURS PRN
Status: DISCONTINUED | OUTPATIENT
Start: 2019-10-09 | End: 2019-10-11 | Stop reason: HOSPADM

## 2019-10-09 RX ORDER — DIPHENHYDRAMINE HCL 25 MG
25 TABLET ORAL EVERY 6 HOURS PRN
Status: DISCONTINUED | OUTPATIENT
Start: 2019-10-09 | End: 2019-10-11 | Stop reason: HOSPADM

## 2019-10-09 RX ORDER — SODIUM CHLORIDE, SODIUM LACTATE, POTASSIUM CHLORIDE, CALCIUM CHLORIDE 600; 310; 30; 20 MG/100ML; MG/100ML; MG/100ML; MG/100ML
INJECTION, SOLUTION INTRAVENOUS CONTINUOUS
Status: DISCONTINUED | OUTPATIENT
Start: 2019-10-09 | End: 2019-10-09 | Stop reason: HOSPADM

## 2019-10-09 RX ORDER — HYDRALAZINE HYDROCHLORIDE 20 MG/ML
10 INJECTION INTRAMUSCULAR; INTRAVENOUS
Status: DISCONTINUED | OUTPATIENT
Start: 2019-10-09 | End: 2019-10-11 | Stop reason: HOSPADM

## 2019-10-09 RX ORDER — ENEMA 19; 7 G/133ML; G/133ML
1 ENEMA RECTAL
Status: DISCONTINUED | OUTPATIENT
Start: 2019-10-09 | End: 2019-10-11 | Stop reason: HOSPADM

## 2019-10-09 RX ORDER — DIPHENHYDRAMINE HYDROCHLORIDE 50 MG/ML
12.5 INJECTION INTRAMUSCULAR; INTRAVENOUS
Status: DISCONTINUED | OUTPATIENT
Start: 2019-10-09 | End: 2019-10-09 | Stop reason: HOSPADM

## 2019-10-09 RX ORDER — BACITRACIN 50000 [IU]/1
INJECTION, POWDER, FOR SOLUTION INTRAMUSCULAR
Status: DISCONTINUED | OUTPATIENT
Start: 2019-10-09 | End: 2019-10-09 | Stop reason: HOSPADM

## 2019-10-09 RX ORDER — DOCUSATE SODIUM 100 MG/1
100 CAPSULE, LIQUID FILLED ORAL 2 TIMES DAILY
Status: DISCONTINUED | OUTPATIENT
Start: 2019-10-09 | End: 2019-10-11 | Stop reason: HOSPADM

## 2019-10-09 RX ORDER — CEFAZOLIN SODIUM 2 G/100ML
2 INJECTION, SOLUTION INTRAVENOUS EVERY 8 HOURS
Status: COMPLETED | OUTPATIENT
Start: 2019-10-09 | End: 2019-10-10

## 2019-10-09 RX ORDER — CYCLOBENZAPRINE HCL 10 MG
10 TABLET ORAL EVERY 8 HOURS PRN
Status: DISCONTINUED | OUTPATIENT
Start: 2019-10-09 | End: 2019-10-11 | Stop reason: HOSPADM

## 2019-10-09 RX ORDER — HYDROMORPHONE HYDROCHLORIDE 1 MG/ML
0.4 INJECTION, SOLUTION INTRAMUSCULAR; INTRAVENOUS; SUBCUTANEOUS
Status: DISCONTINUED | OUTPATIENT
Start: 2019-10-09 | End: 2019-10-09 | Stop reason: HOSPADM

## 2019-10-09 RX ORDER — GABAPENTIN 300 MG/1
900 CAPSULE ORAL 3 TIMES DAILY
COMMUNITY
End: 2021-11-05

## 2019-10-09 RX ORDER — CEFAZOLIN SODIUM 1 G/3ML
INJECTION, POWDER, FOR SOLUTION INTRAMUSCULAR; INTRAVENOUS PRN
Status: DISCONTINUED | OUTPATIENT
Start: 2019-10-09 | End: 2019-10-09 | Stop reason: SURG

## 2019-10-09 RX ORDER — VANCOMYCIN HYDROCHLORIDE 1 G/20ML
INJECTION, POWDER, LYOPHILIZED, FOR SOLUTION INTRAVENOUS
Status: COMPLETED | OUTPATIENT
Start: 2019-10-09 | End: 2019-10-09

## 2019-10-09 RX ORDER — MEPERIDINE HYDROCHLORIDE 25 MG/ML
6.25 INJECTION INTRAMUSCULAR; INTRAVENOUS; SUBCUTANEOUS
Status: DISCONTINUED | OUTPATIENT
Start: 2019-10-09 | End: 2019-10-09 | Stop reason: HOSPADM

## 2019-10-09 RX ORDER — GABAPENTIN 300 MG/1
900 CAPSULE ORAL 3 TIMES DAILY
Status: DISCONTINUED | OUTPATIENT
Start: 2019-10-09 | End: 2019-10-11 | Stop reason: HOSPADM

## 2019-10-09 RX ORDER — HYDROMORPHONE HYDROCHLORIDE 2 MG/ML
INJECTION, SOLUTION INTRAMUSCULAR; INTRAVENOUS; SUBCUTANEOUS PRN
Status: DISCONTINUED | OUTPATIENT
Start: 2019-10-09 | End: 2019-10-09 | Stop reason: SURG

## 2019-10-09 RX ORDER — AMOXICILLIN 250 MG
1 CAPSULE ORAL NIGHTLY
Status: DISCONTINUED | OUTPATIENT
Start: 2019-10-09 | End: 2019-10-11 | Stop reason: HOSPADM

## 2019-10-09 RX ORDER — OXYCODONE HCL 5 MG/5 ML
5 SOLUTION, ORAL ORAL
Status: COMPLETED | OUTPATIENT
Start: 2019-10-09 | End: 2019-10-09

## 2019-10-09 RX ORDER — POLYETHYLENE GLYCOL 3350 17 G/17G
1 POWDER, FOR SOLUTION ORAL 2 TIMES DAILY PRN
Status: DISCONTINUED | OUTPATIENT
Start: 2019-10-09 | End: 2019-10-11 | Stop reason: HOSPADM

## 2019-10-09 RX ORDER — MIDAZOLAM HYDROCHLORIDE 1 MG/ML
INJECTION INTRAMUSCULAR; INTRAVENOUS PRN
Status: DISCONTINUED | OUTPATIENT
Start: 2019-10-09 | End: 2019-10-09 | Stop reason: SURG

## 2019-10-09 RX ORDER — TIOTROPIUM BROMIDE 18 UG/1
1 CAPSULE ORAL; RESPIRATORY (INHALATION) DAILY
Status: DISCONTINUED | OUTPATIENT
Start: 2019-10-10 | End: 2019-10-11 | Stop reason: HOSPADM

## 2019-10-09 RX ORDER — POLYETHYLENE GLYCOL 3350 17 G/17G
1 POWDER, FOR SOLUTION ORAL DAILY
Status: DISCONTINUED | OUTPATIENT
Start: 2019-10-09 | End: 2019-10-09

## 2019-10-09 RX ADMIN — OXYCODONE HYDROCHLORIDE 10 MG: 5 SOLUTION ORAL at 16:03

## 2019-10-09 RX ADMIN — FENTANYL CITRATE 50 MCG: 50 INJECTION, SOLUTION INTRAMUSCULAR; INTRAVENOUS at 15:30

## 2019-10-09 RX ADMIN — FENTANYL CITRATE 50 MCG: 50 INJECTION, SOLUTION INTRAMUSCULAR; INTRAVENOUS at 16:35

## 2019-10-09 RX ADMIN — SODIUM CHLORIDE, POTASSIUM CHLORIDE, SODIUM LACTATE AND CALCIUM CHLORIDE: 600; 310; 30; 20 INJECTION, SOLUTION INTRAVENOUS at 11:54

## 2019-10-09 RX ADMIN — SODIUM CHLORIDE, POTASSIUM CHLORIDE, SODIUM LACTATE AND CALCIUM CHLORIDE: 600; 310; 30; 20 INJECTION, SOLUTION INTRAVENOUS at 13:51

## 2019-10-09 RX ADMIN — FENTANYL CITRATE 50 MCG: 50 INJECTION, SOLUTION INTRAMUSCULAR; INTRAVENOUS at 15:27

## 2019-10-09 RX ADMIN — HYDROMORPHONE HYDROCHLORIDE 0.4 MG: 1 INJECTION, SOLUTION INTRAMUSCULAR; INTRAVENOUS; SUBCUTANEOUS at 16:54

## 2019-10-09 RX ADMIN — POTASSIUM CHLORIDE AND SODIUM CHLORIDE: 900; 150 INJECTION, SOLUTION INTRAVENOUS at 20:04

## 2019-10-09 RX ADMIN — FENTANYL CITRATE 25 MCG: 50 INJECTION, SOLUTION INTRAMUSCULAR; INTRAVENOUS at 16:05

## 2019-10-09 RX ADMIN — PROPOFOL 120 MG: 10 INJECTION, EMULSION INTRAVENOUS at 12:25

## 2019-10-09 RX ADMIN — LIDOCAINE HYDROCHLORIDE 40 MG: 20 INJECTION, SOLUTION EPIDURAL; INFILTRATION; INTRACAUDAL at 12:25

## 2019-10-09 RX ADMIN — HYDROMORPHONE HYDROCHLORIDE 0.4 MG: 1 INJECTION, SOLUTION INTRAMUSCULAR; INTRAVENOUS; SUBCUTANEOUS at 17:05

## 2019-10-09 RX ADMIN — HYDROMORPHONE HYDROCHLORIDE 0.4 MG: 1 INJECTION, SOLUTION INTRAMUSCULAR; INTRAVENOUS; SUBCUTANEOUS at 16:28

## 2019-10-09 RX ADMIN — Medication 0.5 ML: at 11:50

## 2019-10-09 RX ADMIN — SENNOSIDES, DOCUSATE SODIUM 1 TABLET: 50; 8.6 TABLET, FILM COATED ORAL at 20:03

## 2019-10-09 RX ADMIN — ROCURONIUM BROMIDE 10 MG: 10 INJECTION, SOLUTION INTRAVENOUS at 14:54

## 2019-10-09 RX ADMIN — FENTANYL CITRATE 50 MCG: 50 INJECTION, SOLUTION INTRAMUSCULAR; INTRAVENOUS at 15:26

## 2019-10-09 RX ADMIN — ROCURONIUM BROMIDE 10 MG: 10 INJECTION, SOLUTION INTRAVENOUS at 13:40

## 2019-10-09 RX ADMIN — FENTANYL CITRATE 50 MCG: 50 INJECTION, SOLUTION INTRAMUSCULAR; INTRAVENOUS at 16:08

## 2019-10-09 RX ADMIN — ONDANSETRON 4 MG: 2 INJECTION INTRAMUSCULAR; INTRAVENOUS at 15:24

## 2019-10-09 RX ADMIN — ROCURONIUM BROMIDE 10 MG: 10 INJECTION, SOLUTION INTRAVENOUS at 14:12

## 2019-10-09 RX ADMIN — HYDROMORPHONE HYDROCHLORIDE 0.4 MG: 1 INJECTION, SOLUTION INTRAMUSCULAR; INTRAVENOUS; SUBCUTANEOUS at 16:17

## 2019-10-09 RX ADMIN — METHOCARBAMOL TABLETS 750 MG: 750 TABLET, COATED ORAL at 20:43

## 2019-10-09 RX ADMIN — CEFAZOLIN SODIUM 2 G: 2 INJECTION, SOLUTION INTRAVENOUS at 20:46

## 2019-10-09 RX ADMIN — ROCURONIUM BROMIDE 40 MG: 10 INJECTION, SOLUTION INTRAVENOUS at 12:25

## 2019-10-09 RX ADMIN — LIDOCAINE HYDROCHLORIDE 0.5 ML: 10 INJECTION, SOLUTION EPIDURAL; INFILTRATION; INTRACAUDAL at 11:50

## 2019-10-09 RX ADMIN — CEFAZOLIN 2 G: 330 INJECTION, POWDER, FOR SOLUTION INTRAMUSCULAR; INTRAVENOUS at 12:30

## 2019-10-09 RX ADMIN — Medication: at 20:10

## 2019-10-09 RX ADMIN — FENTANYL CITRATE 50 MCG: 50 INJECTION, SOLUTION INTRAMUSCULAR; INTRAVENOUS at 12:45

## 2019-10-09 RX ADMIN — ALBUTEROL SULFATE 2 PUFF: 90 AEROSOL, METERED RESPIRATORY (INHALATION) at 21:48

## 2019-10-09 RX ADMIN — HYDROMORPHONE HYDROCHLORIDE 0.2 MG: 1 INJECTION, SOLUTION INTRAMUSCULAR; INTRAVENOUS; SUBCUTANEOUS at 17:10

## 2019-10-09 RX ADMIN — HYDROMORPHONE HYDROCHLORIDE 0.2 MG: 1 INJECTION, SOLUTION INTRAMUSCULAR; INTRAVENOUS; SUBCUTANEOUS at 16:33

## 2019-10-09 RX ADMIN — FENTANYL CITRATE 50 MCG: 50 INJECTION, SOLUTION INTRAMUSCULAR; INTRAVENOUS at 16:40

## 2019-10-09 RX ADMIN — FENTANYL CITRATE 25 MCG: 50 INJECTION, SOLUTION INTRAMUSCULAR; INTRAVENOUS at 16:13

## 2019-10-09 RX ADMIN — DEXAMETHASONE SODIUM PHOSPHATE 8 MG: 4 INJECTION, SOLUTION INTRA-ARTICULAR; INTRALESIONAL; INTRAMUSCULAR; INTRAVENOUS; SOFT TISSUE at 12:35

## 2019-10-09 RX ADMIN — HYDROMORPHONE HYDROCHLORIDE 1 MG: 2 INJECTION, SOLUTION INTRAMUSCULAR; INTRAVENOUS; SUBCUTANEOUS at 15:32

## 2019-10-09 RX ADMIN — FENTANYL CITRATE 50 MCG: 50 INJECTION, SOLUTION INTRAMUSCULAR; INTRAVENOUS at 12:25

## 2019-10-09 RX ADMIN — MIDAZOLAM 2 MG: 1 INJECTION INTRAMUSCULAR; INTRAVENOUS at 12:20

## 2019-10-09 RX ADMIN — GABAPENTIN 900 MG: 300 CAPSULE ORAL at 20:02

## 2019-10-09 RX ADMIN — DOCUSATE SODIUM 100 MG: 100 CAPSULE, LIQUID FILLED ORAL at 20:03

## 2019-10-09 ASSESSMENT — COGNITIVE AND FUNCTIONAL STATUS - GENERAL
MOVING FROM LYING ON BACK TO SITTING ON SIDE OF FLAT BED: A LITTLE
DRESSING REGULAR LOWER BODY CLOTHING: A LOT
SUGGESTED CMS G CODE MODIFIER MOBILITY: CK
SUGGESTED CMS G CODE MODIFIER DAILY ACTIVITY: CK
DAILY ACTIVITIY SCORE: 18
PERSONAL GROOMING: A LITTLE
CLIMB 3 TO 5 STEPS WITH RAILING: A LOT
WALKING IN HOSPITAL ROOM: A LITTLE
HELP NEEDED FOR BATHING: A LITTLE
TURNING FROM BACK TO SIDE WHILE IN FLAT BAD: A LITTLE
MOVING TO AND FROM BED TO CHAIR: A LITTLE
STANDING UP FROM CHAIR USING ARMS: A LITTLE
TOILETING: A LITTLE
DRESSING REGULAR UPPER BODY CLOTHING: A LITTLE
MOBILITY SCORE: 17

## 2019-10-09 ASSESSMENT — PAIN SCALES - GENERAL: PAIN_LEVEL: 6

## 2019-10-09 NOTE — ANESTHESIA TIME REPORT
Anesthesia Start and Stop Event Times     Date Time Event    10/9/2019 1211 Ready for Procedure     1220 Anesthesia Start     1551 Anesthesia Stop        Responsible Staff  10/09/19    Name Role Begin End    Bayron Norwood M.D. Anesth 1220 1551        Preop Diagnosis (Free Text):  Pre-op Diagnosis     SPONDYLOLISTHESIS        Preop Diagnosis (Codes):    Post op Diagnosis  Acquired spondylolisthesis of lumbosacral region      Premium Reason  A. 3PM - 7AM    Comments:

## 2019-10-09 NOTE — ANESTHESIA PREPROCEDURE EVALUATION
Relevant Problems   PULMONARY   (+) COPD mixed type (HCC)       Physical Exam    Airway   Mallampati: II  TM distance: >3 FB  Neck ROM: full       Cardiovascular - normal exam  Rhythm: regular  Rate: normal  (-) murmur     Dental - normal exam  (+) upper dentures           Pulmonary - normal exam  Breath sounds clear to auscultation  (+) decreased breath sounds     Abdominal    Neurological - normal exam                 Anesthesia Plan    ASA 2       Plan - general       Airway plan will be ETT        Induction: intravenous    Postoperative Plan: Postoperative administration of opioids is intended.    Pertinent diagnostic labs and testing reviewed    Informed Consent:    Anesthetic plan and risks discussed with patient.    Use of blood products discussed with: patient whom consented to blood products.

## 2019-10-09 NOTE — PROGRESS NOTES
Pre op assessment complete, pt's VSS, pt and family updated on POC. Call light within reach, pt denies any other needs at this time.     Low O2 saturation (90%)  initially in pre op, pt states he does not use home O2 and that he is a shallow breather, RN reassessed O2 while pt performed deep breathing, higher O2 sats now

## 2019-10-09 NOTE — ANESTHESIA QCDR
2019 [facility] Clinical Data Registry (for Quality Improvement)     Postoperative nausea/vomiting risk protocol (Adult = 18 yrs and Pediatric 3-17 yrs)- (430 and 463)  General inhalation anesthetic (NOT TIVA) with PONV risk factors: Yes  Provision of anti-emetic therapy with at least 2 different classes of agents: Yes   Patient DID NOT receive anti-emetic therapy and reason is documented in Medical Record:  N/A    Multimodal Pain Management- (AQI59)  Patient undergoing Elective Surgery (i.e. Outpatient, or ASC, or Prescheduled Surgery prior to Hospital Admission): Yes  Use of Multimodal Pain Management, two or more drugs and/or interventions, NOT including systemic opioids: Yes   Exception: Documented allergy to multiple classes of analgesics:  N/A    PACU assessment of acute postoperative pain prior to Anesthesia Care End- Applies to Patients Age = 18- (ABG7)  Initial PACU pain score is which of the following: < 7/10  Patient unable to report pain score: N/A    Post-anesthetic transfer of care checklist/protocol to PACU/ICU- (426 and 427)  Upon conclusion of case, patient transferred to which of the following locations: PACU/Non-ICU  Use of transfer checklist/protocol: Yes  Exclusion: Service Performed in Patient Hospital Room (and thus did not require transfer): N/A    PACU Reintubation- (AQI31)  General anesthesia requiring endotracheal intubation (ETT) along with subsequent extubation in OR or PACU: Yes  Required reintubation in the PACU: No   Extubation was a planned trial documented in the medical record prior to removal of the original airway device:  N/A    Unplanned admission to ICU related to anesthesia service up through end of PACU care- (MD51)  Unplanned admission to ICU (not initially anticipated at anesthesia start time): No

## 2019-10-09 NOTE — ED NOTES
Med Rec complete per Pt at bedside  Allergies Reviewed  No ABX in the last 14 days  Ok per Pt to discuss medications with visitor/s present    Medications HELD for surgery

## 2019-10-09 NOTE — ANESTHESIA PROCEDURE NOTES
Airway  Date/Time: 10/9/2019 12:28 PM  Performed by: Bayron Norwood M.D.  Authorized by: Bayron Norwood M.D.     Location:  OR  Urgency:  Elective  Difficult Airway: No    Indications for Airway Management:  Anesthesia  Spontaneous Ventilation: absent    Sedation Level:  Deep  Preoxygenated: Yes    Patient Position:  Sniffing  Mask Difficulty Assessment:  2 - vent by mask + OA or adjuvant +/- NMBA  Final Airway Type:  Endotracheal airway  Final Endotracheal Airway:  ETT  Cuffed: Yes    Technique Used for Successful ETT Placement:  Direct laryngoscopy  Insertion Site:  Oral  Blade Type:  Eugene  Laryngoscope Blade/Videolaryngoscope Blade Size:  3  ETT Size (mm):  8.0  Measured from:  Teeth  ETT to Teeth (cm):  25  Placement Verified by: auscultation and capnometry    Cormack-Lehane Classification:  Grade I - full view of glottis  Number of Attempts at Approach:  1

## 2019-10-10 LAB
ANION GAP SERPL CALC-SCNC: 7 MMOL/L (ref 0–11.9)
BUN SERPL-MCNC: 16 MG/DL (ref 8–22)
CALCIUM SERPL-MCNC: 8.5 MG/DL (ref 8.5–10.5)
CHLORIDE SERPL-SCNC: 105 MMOL/L (ref 96–112)
CO2 SERPL-SCNC: 26 MMOL/L (ref 20–33)
CREAT SERPL-MCNC: 0.83 MG/DL (ref 0.5–1.4)
ERYTHROCYTE [DISTWIDTH] IN BLOOD BY AUTOMATED COUNT: 46.2 FL (ref 35.9–50)
GLUCOSE SERPL-MCNC: 122 MG/DL (ref 65–99)
HCT VFR BLD AUTO: 32.6 % (ref 42–52)
HGB BLD-MCNC: 10.5 G/DL (ref 14–18)
MCH RBC QN AUTO: 31.4 PG (ref 27–33)
MCHC RBC AUTO-ENTMCNC: 31.9 G/DL (ref 33.7–35.3)
MCV RBC AUTO: 98.5 FL (ref 81.4–97.8)
PLATELET # BLD AUTO: 203 K/UL (ref 164–446)
PMV BLD AUTO: 11.7 FL (ref 9–12.9)
POTASSIUM SERPL-SCNC: 4.5 MMOL/L (ref 3.6–5.5)
RBC # BLD AUTO: 3.31 M/UL (ref 4.7–6.1)
SODIUM SERPL-SCNC: 138 MMOL/L (ref 135–145)
WBC # BLD AUTO: 15 K/UL (ref 4.8–10.8)

## 2019-10-10 PROCEDURE — 97161 PT EVAL LOW COMPLEX 20 MIN: CPT

## 2019-10-10 PROCEDURE — 80048 BASIC METABOLIC PNL TOTAL CA: CPT

## 2019-10-10 PROCEDURE — 700112 HCHG RX REV CODE 229: Performed by: NURSE PRACTITIONER

## 2019-10-10 PROCEDURE — 700111 HCHG RX REV CODE 636 W/ 250 OVERRIDE (IP): Performed by: NURSE PRACTITIONER

## 2019-10-10 PROCEDURE — 36415 COLL VENOUS BLD VENIPUNCTURE: CPT

## 2019-10-10 PROCEDURE — A9270 NON-COVERED ITEM OR SERVICE: HCPCS | Performed by: NURSE PRACTITIONER

## 2019-10-10 PROCEDURE — 85027 COMPLETE CBC AUTOMATED: CPT

## 2019-10-10 PROCEDURE — 700102 HCHG RX REV CODE 250 W/ 637 OVERRIDE(OP): Performed by: NURSE PRACTITIONER

## 2019-10-10 PROCEDURE — 770001 HCHG ROOM/CARE - MED/SURG/GYN PRIV*

## 2019-10-10 PROCEDURE — 94760 N-INVAS EAR/PLS OXIMETRY 1: CPT

## 2019-10-10 RX ORDER — OXYCODONE AND ACETAMINOPHEN 10; 325 MG/1; MG/1
2 TABLET ORAL EVERY 4 HOURS PRN
Status: DISCONTINUED | OUTPATIENT
Start: 2019-10-10 | End: 2019-10-11 | Stop reason: HOSPADM

## 2019-10-10 RX ORDER — TIZANIDINE 4 MG/1
4 TABLET ORAL EVERY 6 HOURS PRN
Qty: 30 TAB | Refills: 0
Start: 2019-10-10

## 2019-10-10 RX ORDER — OXYCODONE AND ACETAMINOPHEN 10; 325 MG/1; MG/1
2 TABLET ORAL EVERY 6 HOURS PRN
Qty: 56 TAB | Refills: 0
Start: 2019-10-10 | End: 2019-10-17

## 2019-10-10 RX ORDER — TIZANIDINE 4 MG/1
4 TABLET ORAL EVERY 8 HOURS
Status: DISCONTINUED | OUTPATIENT
Start: 2019-10-10 | End: 2019-10-11 | Stop reason: HOSPADM

## 2019-10-10 RX ADMIN — TIZANIDINE 4 MG: 4 TABLET ORAL at 14:20

## 2019-10-10 RX ADMIN — GABAPENTIN 900 MG: 300 CAPSULE ORAL at 11:59

## 2019-10-10 RX ADMIN — CYCLOBENZAPRINE HYDROCHLORIDE 10 MG: 10 TABLET, FILM COATED ORAL at 01:19

## 2019-10-10 RX ADMIN — OXYCODONE HYDROCHLORIDE AND ACETAMINOPHEN 2 TABLET: 10; 325 TABLET ORAL at 20:45

## 2019-10-10 RX ADMIN — GABAPENTIN 900 MG: 300 CAPSULE ORAL at 05:57

## 2019-10-10 RX ADMIN — DIAZEPAM 5 MG: 2 TABLET ORAL at 09:10

## 2019-10-10 RX ADMIN — Medication: at 09:50

## 2019-10-10 RX ADMIN — CEFAZOLIN SODIUM 2 G: 2 INJECTION, SOLUTION INTRAVENOUS at 05:56

## 2019-10-10 RX ADMIN — Medication: at 02:40

## 2019-10-10 RX ADMIN — TIOTROPIUM BROMIDE 1 CAPSULE: 18 CAPSULE ORAL; RESPIRATORY (INHALATION) at 06:00

## 2019-10-10 RX ADMIN — ALBUTEROL SULFATE 2 PUFF: 90 AEROSOL, METERED RESPIRATORY (INHALATION) at 12:15

## 2019-10-10 RX ADMIN — DOCUSATE SODIUM 100 MG: 100 CAPSULE, LIQUID FILLED ORAL at 05:57

## 2019-10-10 RX ADMIN — ALBUTEROL SULFATE 2 PUFF: 90 AEROSOL, METERED RESPIRATORY (INHALATION) at 20:46

## 2019-10-10 RX ADMIN — POLYETHYLENE GLYCOL 3350 1 PACKET: 17 POWDER, FOR SOLUTION ORAL at 05:57

## 2019-10-10 RX ADMIN — OXYCODONE HYDROCHLORIDE AND ACETAMINOPHEN 2 TABLET: 10; 325 TABLET ORAL at 11:59

## 2019-10-10 RX ADMIN — ATORVASTATIN CALCIUM 40 MG: 40 TABLET, FILM COATED ORAL at 05:57

## 2019-10-10 RX ADMIN — SENNOSIDES, DOCUSATE SODIUM 1 TABLET: 50; 8.6 TABLET, FILM COATED ORAL at 20:46

## 2019-10-10 ASSESSMENT — LIFESTYLE VARIABLES
HAVE YOU EVER FELT YOU SHOULD CUT DOWN ON YOUR DRINKING: NO
CONSUMPTION TOTAL: NEGATIVE
EVER_SMOKED: YES
TOTAL SCORE: 0
EVER HAD A DRINK FIRST THING IN THE MORNING TO STEADY YOUR NERVES TO GET RID OF A HANGOVER: NO
HAVE PEOPLE ANNOYED YOU BY CRITICIZING YOUR DRINKING: NO
HOW MANY TIMES IN THE PAST YEAR HAVE YOU HAD 5 OR MORE DRINKS IN A DAY: 0
ALCOHOL_USE: NO
EVER FELT BAD OR GUILTY ABOUT YOUR DRINKING: NO
TOTAL SCORE: 0
AVERAGE NUMBER OF DAYS PER WEEK YOU HAVE A DRINK CONTAINING ALCOHOL: 0
ON A TYPICAL DAY WHEN YOU DRINK ALCOHOL HOW MANY DRINKS DO YOU HAVE: 0
TOTAL SCORE: 0

## 2019-10-10 ASSESSMENT — GAIT ASSESSMENTS
ASSISTIVE DEVICE: FRONT WHEEL WALKER
DISTANCE (FEET): 100
DEVIATION: BRADYKINETIC
GAIT LEVEL OF ASSIST: STAND BY ASSIST

## 2019-10-10 ASSESSMENT — COPD QUESTIONNAIRES
DURING THE PAST 4 WEEKS HOW MUCH DID YOU FEEL SHORT OF BREATH: SOME OF THE TIME
HAVE YOU SMOKED AT LEAST 100 CIGARETTES IN YOUR ENTIRE LIFE: YES
DO YOU EVER COUGH UP ANY MUCUS OR PHLEGM?: NO/ONLY WITH OCCASIONAL COLDS OR INFECTIONS
COPD SCREENING SCORE: 6

## 2019-10-10 ASSESSMENT — COGNITIVE AND FUNCTIONAL STATUS - GENERAL
MOVING FROM LYING ON BACK TO SITTING ON SIDE OF FLAT BED: A LITTLE
TURNING FROM BACK TO SIDE WHILE IN FLAT BAD: A LITTLE
CLIMB 3 TO 5 STEPS WITH RAILING: A LITTLE
STANDING UP FROM CHAIR USING ARMS: A LITTLE
MOBILITY SCORE: 18
MOVING TO AND FROM BED TO CHAIR: A LITTLE
WALKING IN HOSPITAL ROOM: A LITTLE
SUGGESTED CMS G CODE MODIFIER MOBILITY: CK

## 2019-10-10 NOTE — CARE PLAN
Problem: Communication  Goal: The ability to communicate needs accurately and effectively will improve  Outcome: PROGRESSING AS EXPECTED    Patient able to communicate needs to staff. Call light within reach, patient educated to call for assistance. Patient calls appropriately. Will continue to assess.      Problem: Safety  Goal: Will remain free from injury  Outcome: PROGRESSING AS EXPECTED     Patient educated to call for assistance. Precautions in place; Call light within reach. Bed alarm in use.  Bed locked and in lowest position. Treaded socks in place. Hourly rounding. Will continue to monitor.

## 2019-10-10 NOTE — THERAPY
Physical Therapy Contact Note    PT caroline received, RN requesting to return in a few hours once pain controlled. Will check back later this am/pm as appropriate.    Melani Ag, PT, DPT  [phone]

## 2019-10-10 NOTE — RESPIRATORY CARE
COPD EDUCATION by COPD CLINICAL EDUCATOR  10/10/2019  at  1:29 PM by Donnie Sheikh     Patient interviewed by COPD education team.  Patient unable to participate in full program.  Short intervention has been conducted.  A comprehensive packet including information about COPD, treatments, and smoking cessation given. Provided spacer with instruction for use, care, and cleaning.

## 2019-10-10 NOTE — PROGRESS NOTES
Neurosurgery Progress Note    Subjective:  States LE improved. Some back pain  Amb a small amount yesterday, estrada, no nausea    Exam:  BLE str intact CDIwith Hvac 120    BP  Min: 95/67  Max: 144/61  Pulse  Av.4  Min: 55  Max: 104  Resp  Av  Min: 14  Max: 27  Temp  Av.9 °C (98.4 °F)  Min: 36.7 °C (98 °F)  Max: 37.3 °C (99.1 °F)  SpO2  Av %  Min: 92 %  Max: 99 %    No data recorded    Recent Labs     10/10/19  0210   WBC 15.0*   RBC 3.31*   HEMOGLOBIN 10.5*   HEMATOCRIT 32.6*   MCV 98.5*   MCH 31.4   MCHC 31.9*   RDW 46.2   PLATELETCT 203   MPV 11.7     Recent Labs     10/10/19  0210   SODIUM 138   POTASSIUM 4.5   CHLORIDE 105   CO2 26   GLUCOSE 122*   BUN 16   CREATININE 0.83   CALCIUM 8.5               Intake/Output       10/09/19 0700 - 10/10/19 0659 10/10/19 07 - 10/11/19 0659       6933-6196 Total 6326-9728 1320-2728 Total       Intake    P.O.  461  -- 461  --  -- --    P.O. 461 -- 461 -- -- --    I.V.  2000  88.9  -- 88.9    PCA End of Shift Total Volume (ml) -- -- -- 88.9 -- 88.9    Volume (mL) (lactated ringers infusion) 2000 -- -- --    Total Intake 2461 -- 2461 88.9 -- 88.9       Output    Urine  325  1800 2125  --  -- --    Urine 150 -- 150 -- -- --    Output (mL) (Urethral Catheter Latex 16 Fr.) 175 1800  -- -- --    Drains  --  280 280  --  -- --    Output (mL) (Closed/Suction Drain 1 Posterior Back Hemovac) -- 280 280 -- -- --    Blood  150  -- 150  --  -- --    Est. Blood Loss 150 -- 150 -- -- --    Total Output 475 2080 2555 -- -- --       Net I/O     1986 88.9 -- 88.9            Intake/Output Summary (Last 24 hours) at 10/10/2019 1131  Last data filed at 10/10/2019 0710  Gross per 24 hour   Intake 2549.9 ml   Output 2555 ml   Net -5.1 ml            • atorvastatin  40 mg DAILY   • cloNIDine  0.1 mg BID   • gabapentin  900 mg TID   • tiotropium  1 Cap DAILY   • Pharmacy Consult Request  1 Each PHARMACY TO DOSE   • MD ALERT...DO NOT  ADMINISTER NSAIDS or ASPIRIN unless ORDERED By Neurosurgery  1 Each PRN   • docusate sodium  100 mg BID   • senna-docusate  1 Tab Nightly   • senna-docusate  1 Tab Q24HRS PRN   • polyethylene glycol/lytes  1 Packet BID PRN   • magnesium hydroxide  30 mL QDAY PRN   • bisacodyl  10 mg Q24HRS PRN   • fleet  1 Each Once PRN   • Respiratory Care per Protocol   Continuous RT   • 0.9 % NaCl with KCl 20 mEq 1,000 mL   Continuous   • morphine   Continuous   • ondansetron  4 mg Q4HRS PRN   • ondansetron  4 mg Q4HRS PRN   • diphenhydrAMINE  25 mg Q6HRS PRN    Or   • diphenhydrAMINE  25 mg Q6HRS PRN   • methocarbamol  750 mg Q8HRS PRN    Or   • cyclobenzaprine  10 mg Q8HRS PRN    Or   • diazePAM  5 mg Q4HRS PRN   • labetalol  10 mg Q HOUR PRN   • hydrALAZINE  10 mg Q HOUR PRN   • albuterol  2 Puff Q4HRS PRN       Assessment and Plan:    POD #1 L4-S1 lami, w L45 fusion/TLIF  Prophylactic anticoagulation: no         Start date/time: tbd    Plan:  DC pca- start orals  PT ambulate  DC estrada- bladder protocol  Monitor hvac

## 2019-10-10 NOTE — THERAPY
"Physical Therapy Evaluation completed.   Bed Mobility:  Supine to Sit: Stand by Assist(VC for sequencing with log roll)  Transfers: Sit to Stand: Supervised  Gait: Level Of Assist: Stand by Assist with Front-Wheel Walker  x100ft     Plan of Care: Will benefit from Physical Therapy 4 times per week  Discharge Recommendations: Equipment: Will Continue to Assess for Equipment Needs. Post-acute therapy Discharge to home with home health for additional skilled therapy services.    Pt is 75 y/o M s/p elective lumbar spine sx, no brace ordered. Pt provided education on spinal precautions and able to verbalize them. He reports some minimla back pain throughout mobility. Pt requiring SBA for bed mobility, transfers, and amb with FWW. Pt tolerated amb x100ft with FWW and SBA. He reported some c/o dizziness SpO2 94% on 1LPM O2. Pt was able to safely negotiate directional changes without any overt LOB. Anticipate DC home recommend home health transitional care for continued physical therapy services.     See \"Rehab Therapy-Acute\" Patient Summary Report for complete documentation.     "

## 2019-10-10 NOTE — OR NURSING
Pt on 2 L NC at this time.  No c/o nausea, tolerating PO fluids/juice and medication.  Back surgical pain now tolerable per pt, 4/10 down from 9/10.  Back surgical dressing CDI.  Hemovac compressed, patent and draining serosanguinous fluid. BUE strength 5, BLE strength.  Knutson patent and draining. VSS, afebrile, HARTLEY, A/O x4.    One clear pt belongings bag and one green duffel on bed.  Glasses in place.

## 2019-10-10 NOTE — PROGRESS NOTES
RN MOBILITY NOTE - 10/9/19    Surgery patient?: yes  Date of surgery: 10/9  Ambulated 50 ft on day of surgery? (N/A if today is not date of surgery): yes  Number of times ambulated 50 feet or greater today: 1  Patient has been up to chair, edge of bed or HOB 90 degrees for all meals?: 1/1  Goal met? (goal is ambulating at least 50 feet 2 times on day shift, one time on night shift): yes  If patient did not meet mobility goal, why?:

## 2019-10-10 NOTE — PROGRESS NOTES
Pt arrived to the unit. Ambulated from bed to recliner chair. Pt reported some stiffness and wanted to sit up for a while. Wife is at bedside. POC was discussed and question/concerns were addressed at this time.

## 2019-10-10 NOTE — PROGRESS NOTES
1945 Pt sitting up in chair eating meal. Pt A&Ox4. Pt denies numbness and tingling. Pain 10/10, medicated per MAR. Bed locked and in lowest position. Bed alarm on. Pt educated to call for assistance. Call light within reach. SCDs in use. Pt educated on importance of IS, demonstrated use 2000 ml. Pt educated on PCA pump, all questions answered at this time.

## 2019-10-10 NOTE — ANESTHESIA POSTPROCEDURE EVALUATION
Patient: Bayron Parikh    Procedure Summary     Date:  10/09/19 Room / Location:  [facility] 05 / SURGERY [facility]    Anesthesia Start:  1220 Anesthesia Stop:  1551    Procedures:       FUSION, SPINE, LUMBAR, WITH O-ARM IMAGING GUIDANCE- L4-5 TLIF (Spine Lumbar)      LAMINECTOMY, SPINE, LUMBAR, WITH DISCECTOMY- REDO L3-S1 LAMI (Spine Lumbar)      DECOMPRESSION, SPINE, LUMBAR- RIGHT L5 TRANSPEDICULAR (Spine Lumbar) Diagnosis:  (SPONDYLOLISTHESIS)    Surgeon:  Nikolas Dooley M.D. Responsible Provider:  Bayron Norwood M.D.    Anesthesia Type:  general ASA Status:  2          Final Anesthesia Type: general  Last vitals  BP   Blood Pressure : 102/51    Temp   36.7 °C (98 °F)    Pulse   Pulse: 76   Resp   (!) 21    SpO2   98 %      Anesthesia Post Evaluation    Patient location during evaluation: PACU  Patient participation: complete - patient participated  Level of consciousness: awake and alert  Pain score: 6    Airway patency: patent  Anesthetic complications: no  Cardiovascular status: hemodynamically stable  Respiratory status: acceptable  Hydration status: euvolemic    PONV: none           Nurse Pain Score: 6 (NPRS)

## 2019-10-10 NOTE — PROGRESS NOTES
2 RN skin check completed with TRUONG Dumont.   Devices in place SCDs, PIV, estrada catheter, and hemovac.  Skin assessed under devices .  No signs of skin breakdown. Skin not WNL. Surgical incision to lower back (see would LDA)

## 2019-10-11 VITALS
RESPIRATION RATE: 16 BRPM | HEIGHT: 70 IN | TEMPERATURE: 98.8 F | SYSTOLIC BLOOD PRESSURE: 110 MMHG | OXYGEN SATURATION: 95 % | BODY MASS INDEX: 25.98 KG/M2 | DIASTOLIC BLOOD PRESSURE: 43 MMHG | WEIGHT: 181.44 LBS | HEART RATE: 86 BPM

## 2019-10-11 LAB
ANION GAP SERPL CALC-SCNC: 3 MMOL/L (ref 0–11.9)
BUN SERPL-MCNC: 15 MG/DL (ref 8–22)
CALCIUM SERPL-MCNC: 8 MG/DL (ref 8.5–10.5)
CHLORIDE SERPL-SCNC: 102 MMOL/L (ref 96–112)
CO2 SERPL-SCNC: 28 MMOL/L (ref 20–33)
CREAT SERPL-MCNC: 0.72 MG/DL (ref 0.5–1.4)
ERYTHROCYTE [DISTWIDTH] IN BLOOD BY AUTOMATED COUNT: 44.2 FL (ref 35.9–50)
GLUCOSE SERPL-MCNC: 137 MG/DL (ref 65–99)
HCT VFR BLD AUTO: 26.2 % (ref 42–52)
HGB BLD-MCNC: 8.8 G/DL (ref 14–18)
MCH RBC QN AUTO: 31.8 PG (ref 27–33)
MCHC RBC AUTO-ENTMCNC: 33.6 G/DL (ref 33.7–35.3)
MCV RBC AUTO: 94.6 FL (ref 81.4–97.8)
PLATELET # BLD AUTO: 166 K/UL (ref 164–446)
PMV BLD AUTO: 11.3 FL (ref 9–12.9)
POTASSIUM SERPL-SCNC: 3.7 MMOL/L (ref 3.6–5.5)
RBC # BLD AUTO: 2.77 M/UL (ref 4.7–6.1)
SODIUM SERPL-SCNC: 133 MMOL/L (ref 135–145)
WBC # BLD AUTO: 11.4 K/UL (ref 4.8–10.8)

## 2019-10-11 PROCEDURE — 36415 COLL VENOUS BLD VENIPUNCTURE: CPT

## 2019-10-11 PROCEDURE — 700112 HCHG RX REV CODE 229: Performed by: NURSE PRACTITIONER

## 2019-10-11 PROCEDURE — 97530 THERAPEUTIC ACTIVITIES: CPT

## 2019-10-11 PROCEDURE — A9270 NON-COVERED ITEM OR SERVICE: HCPCS | Performed by: NURSE PRACTITIONER

## 2019-10-11 PROCEDURE — 700102 HCHG RX REV CODE 250 W/ 637 OVERRIDE(OP): Performed by: NURSE PRACTITIONER

## 2019-10-11 PROCEDURE — 85027 COMPLETE CBC AUTOMATED: CPT

## 2019-10-11 PROCEDURE — 80048 BASIC METABOLIC PNL TOTAL CA: CPT

## 2019-10-11 PROCEDURE — 97116 GAIT TRAINING THERAPY: CPT

## 2019-10-11 RX ADMIN — ATORVASTATIN CALCIUM 40 MG: 40 TABLET, FILM COATED ORAL at 05:10

## 2019-10-11 RX ADMIN — OXYCODONE HYDROCHLORIDE AND ACETAMINOPHEN 2 TABLET: 10; 325 TABLET ORAL at 05:10

## 2019-10-11 RX ADMIN — DOCUSATE SODIUM 100 MG: 100 CAPSULE, LIQUID FILLED ORAL at 05:11

## 2019-10-11 RX ADMIN — GABAPENTIN 900 MG: 300 CAPSULE ORAL at 05:11

## 2019-10-11 RX ADMIN — OXYCODONE HYDROCHLORIDE AND ACETAMINOPHEN 2 TABLET: 10; 325 TABLET ORAL at 09:09

## 2019-10-11 RX ADMIN — OXYCODONE HYDROCHLORIDE AND ACETAMINOPHEN 2 TABLET: 10; 325 TABLET ORAL at 01:00

## 2019-10-11 RX ADMIN — POLYETHYLENE GLYCOL 3350 1 PACKET: 17 POWDER, FOR SOLUTION ORAL at 05:11

## 2019-10-11 RX ADMIN — ALBUTEROL SULFATE 2 PUFF: 90 AEROSOL, METERED RESPIRATORY (INHALATION) at 05:17

## 2019-10-11 ASSESSMENT — GAIT ASSESSMENTS
ASSISTIVE DEVICE: FRONT WHEEL WALKER
GAIT LEVEL OF ASSIST: SUPERVISED
DISTANCE (FEET): 150

## 2019-10-11 ASSESSMENT — COGNITIVE AND FUNCTIONAL STATUS - GENERAL
MOVING FROM LYING ON BACK TO SITTING ON SIDE OF FLAT BED: A LITTLE
MOVING TO AND FROM BED TO CHAIR: A LITTLE
TURNING FROM BACK TO SIDE WHILE IN FLAT BAD: A LITTLE

## 2019-10-11 NOTE — PROGRESS NOTES
Neurosurgery Progress Note    Subjective:  States LE improved. Eating, pain controlled.   Voiding.     Exam:  BLE str intact- CDI with hvac    BP  Min: 93/48  Max: 124/99  Pulse  Av.4  Min: 73  Max: 104  Resp  Av  Min: 16  Max: 19  Temp  Av.8 °C (98.3 °F)  Min: 36.3 °C (97.4 °F)  Max: 37.3 °C (99.1 °F)  SpO2  Av.2 %  Min: 95 %  Max: 98 %    No data recorded    Recent Labs     10/10/19  0210 10/11/19  0334   WBC 15.0* 11.4*   RBC 3.31* 2.77*   HEMOGLOBIN 10.5* 8.8*   HEMATOCRIT 32.6* 26.2*   MCV 98.5* 94.6   MCH 31.4 31.8   MCHC 31.9* 33.6*   RDW 46.2 44.2   PLATELETCT 203 166   MPV 11.7 11.3     Recent Labs     10/10/19  0210 10/11/19  033   SODIUM 138 133*   POTASSIUM 4.5 3.7   CHLORIDE 105 102   CO2 26 28   GLUCOSE 122* 137*   BUN 16 15   CREATININE 0.83 0.72   CALCIUM 8.5 8.0*               Intake/Output       10/10/19 0700 - 10/11/19 0659 10/11/19 07 - 10/12/19 0659       9769-0757 Total  8904-3962 Total       Intake    I.V.  88.9  -- 88.9  --  -- --    PCA End of Shift Total Volume (ml) 88.9 -- 88.9 -- -- --    Total Intake 88.9 -- 88.9 -- -- --       Output    Urine  --  2800 2800  --  -- --    Number of Times Voided -- 2 x 2 x -- -- --    Urine Void (mL) -- 2800 2800 -- -- --    Drains  120  120 240  --  -- --    Output (mL) (Closed/Suction Drain 1 Posterior Back Hemovac) 120 120 240 -- -- --    Total Output 120 2920 3040 -- -- --       Net I/O     -31.1 -2920 -2951.1 -- -- --            Intake/Output Summary (Last 24 hours) at 10/11/2019 0828  Last data filed at 10/11/2019 0510  Gross per 24 hour   Intake --   Output 3040 ml   Net -3040 ml            • oxyCODONE-acetaminophen  2 Tab Q4HRS PRN   • tizanidine  4 mg Q8HRS   • atorvastatin  40 mg DAILY   • cloNIDine  0.1 mg BID   • gabapentin  900 mg TID   • tiotropium  1 Cap DAILY   • Pharmacy Consult Request  1 Each PHARMACY TO DOSE   • MD ALERT...DO NOT ADMINISTER NSAIDS or ASPIRIN unless ORDERED By Neurosurgery  1  Each PRN   • docusate sodium  100 mg BID   • senna-docusate  1 Tab Nightly   • senna-docusate  1 Tab Q24HRS PRN   • polyethylene glycol/lytes  1 Packet BID PRN   • magnesium hydroxide  30 mL QDAY PRN   • bisacodyl  10 mg Q24HRS PRN   • fleet  1 Each Once PRN   • Respiratory Care per Protocol   Continuous RT   • ondansetron  4 mg Q4HRS PRN   • ondansetron  4 mg Q4HRS PRN   • diphenhydrAMINE  25 mg Q6HRS PRN    Or   • diphenhydrAMINE  25 mg Q6HRS PRN   • methocarbamol  750 mg Q8HRS PRN    Or   • cyclobenzaprine  10 mg Q8HRS PRN    Or   • diazePAM  5 mg Q4HRS PRN   • labetalol  10 mg Q HOUR PRN   • hydrALAZINE  10 mg Q HOUR PRN   • albuterol  2 Puff Q4HRS PRN       Assessment and Plan:  POD #2 Lumbar fusion  Prophylactic anticoagulation: no         Start date/time: tbd    Plan:  Continue pain meds with Perc 10 and Zanaflex  PT- ambulate  Hvac- may dc just before dc or when output less than 30  IS, cough deep breathe  Refuses HH  Home later today or tomorrow- dc prepped- scripts sent to [facility] on [address] through [facility]

## 2019-10-11 NOTE — CARE PLAN
Problem: Safety  Goal: Will remain free from falls  Outcome: PROGRESSING AS EXPECTED    Patient educated to call for assistance. Precautions in place; Call light within reach. Bed alarm in use.  Bed locked and in lowest position. Treaded socks in place. Hourly rounding. Will continue to monitor.      Problem: Bowel/Gastric:  Goal: Normal bowel function is maintained or improved  Outcome: PROGRESSING AS EXPECTED    Fluid intake encouraged along with mobility. PRN medication administered to promote bowel motility.

## 2019-10-11 NOTE — PROGRESS NOTES
2030 Pt A&Ox4. Pt denies numbness and tingling. Pain 9/10, medicated per MAR. Bed locked and in lowest position. Bed alarm on. Pt educated to call for assistance. Call light within reach. SCDs in use. Pt encouraged to continue using IS. No complaints of bladder discomfort or feelings of distention.

## 2019-10-11 NOTE — DISCHARGE SUMMARY
Please see previously dictated dc summary. Patient denies new symptoms.  Is doing well. If pain controlled and ambulating well, will dc home

## 2019-10-11 NOTE — DISCHARGE PLANNING
Anticipated Discharge Disposition:   Home with home health  DME home oxygen    Action:    Pt s/p L4-S1 laminectomies, L4-5 fusion, TLIF  Pt sitting up in chair with wife at bedside.  Pt receiving O2 2 LNC with SPO2 96%.  Pt desaturating when supine.  Pt lives in [place] with spouse in single level house with 2 steps into home.  He has a walker at home.    He has prescription drug coverage through [employer].    Barriers to Discharge:    Hemovac    Plan:    Obtain home health order.  Home oxygen study.    Care Transition Team Assessment    Information Source  Orientation : Oriented x 4  Information Given By: Patient, Spouse  Who is responsible for making decisions for patient? : Patient    Readmission Evaluation  Is this a readmission?: Yes - planned readmission    Elopement Risk  Legal Hold: No  Ambulatory or Self Mobile in Wheelchair: Yes  Disoriented: No  Psychiatric Symptoms: None  History of Wandering: No  Elopement this Admit: No  Vocalizing Wanting to Leave: No  Displays Behaviors, Body Language Wanting to Leave: No-Not at Risk for Elopement  Elopement Risk: Not at Risk for Elopement    Interdisciplinary Discharge Planning  Lives with - Patient's Self Care Capacity: Spouse  Patient or legal guardian wants to designate a caregiver (see row info): No  Housing / Facility: [address] [facility]  Prior Services: None    Discharge Preparedness  What is your plan after discharge?: Home with help  What are your discharge supports?: Spouse  Prior Functional Level: Ambulatory, Drives Self, Independent with Activities of Daily Living, Independent with Medication Management  Difficulity with ADLs: Bathing, Dressing, Walking  Difficulity with IADLs: Cooking, Driving, Laundry, Shopping    Functional Assesment  Prior Functional Level: Ambulatory, Drives Self, Independent with Activities of Daily Living, Independent with Medication Management    Finances  Financial Barriers to Discharge: No  Prescription Coverage: Yes    Vision / Hearing  Impairment  Right Eye Vision: Wears Glasses  Left Eye Vision: Wears Glasses  Hearing Impairment: Left Ear         Advance Directive  Advance Directive?: DPOA for Health Care    Domestic Abuse  Have you ever been the victim of abuse or violence?: No  Physical Abuse or Sexual Abuse: No  Verbal Abuse or Emotional Abuse: No  Possible Abuse Reported to:: Not Applicable         Discharge Risks or Barriers  Discharge risks or barriers?: No    Anticipated Discharge Information  Anticipated discharge disposition: Home, Oxygen  Discharge Address: [address] [place] [id number]  Discharge Contact Phone Number: [phone]

## 2019-10-11 NOTE — PROGRESS NOTES
Walking O2 test:    Sitting with 1L oxygen - 95%  Sitting with no oxygen - 95%  Standing with no oxygen - 92%  Walking with no oxygen - 88%

## 2019-10-11 NOTE — DISCHARGE SUMMARY
DATE OF ADMISSION:  10/09/2019    DATE OF POSSIBLE DISCHARGE:  10/11/2019    OPERATION PERFORMED:  L4 through S1 laminectomy with L4-L5 fusion and TLIF   with Dr. Dooley on 10/09/2019.    On date of admission, operation was performed.  Postoperatively, he does note   his lower extremities are improved.  He is eating, ambulating, voiding.  If   pain controlled, we will discharge home tomorrow.  His strength is intact.    His incision is clean, dry, and intact.    DISCHARGE INSTRUCTIONS:  Given to patient in paper format.    DISCHARGE MEDICATIONS:  1. Percocet 10/325 one to two p.o. q. 4-6 hours p.r.n. pain, not to exceed 8 a   day, #56, no refills.  2. Zanaflex 4 mg 1 p.o. t.i.d. p.r.n. spasm, #60, no refills.    ASSESSMENT AND PLAN:  1. Status post above-delineated surgery with Dr. Dooley on 10/09/2019.  2. Patient will follow up in 4 weeks with x-rays.  I have provided a slip for   this.  3. Patient will avoid smoking and NSAIDs for 4 months.    Should the patient have further questions or concerns, they will not hesitate   to give our office a call.             ____________________________________     BULMARO Bravo / LADONNA    DD:  10/10/2019 13:55:30  DT:  10/11/2019 01:07:21    D#:  [id number]  Job#:  [id number]

## 2019-10-11 NOTE — PROGRESS NOTES
RN MOBILITY NOTE - 10/10/19     Surgery patient?: yes  Date of surgery: 10/9  Ambulated 50 ft on day of surgery? (N/A if today is not date of surgery): n/a  Number of times ambulated 50 feet or greater today: 1  Patient has been up to chair, edge of bed or HOB 90 degrees for all meals?: 3/3  Goal met? (goal is ambulating at least 50 feet 2 times on day shift, one time on night shift): no  If patient did not meet mobility goal, why?: pt refused - pain, education provided

## 2019-10-11 NOTE — PROGRESS NOTES
Pt is alert and oriented. Pt was taken off PCA and Knutson was removed around noon. Pt was given PO pain meds initially and then scheduled muscle relaxer. Pt initially tolerated the pain meds but did become hypotensive in the afternoon after the Tizanidine. He refused to be connected back to IV fluids, instructed pt to increase PO intake of fluids. Pressures eventually stabilized. Attempted to wean pt off O2, maintained adequate sats while awake. Pt's O2 sats will drop to 60s-70s while sleeping, was placed back on O2. Pt's wife requested for O2 test to receive supplemental O2 for at home. Pt had difficulty voiding and voided at the 6.5 hour renee after the increased fluid intake and additional ambulation.     Pt's wife is concerned about pt's possible discharge tomorrow and does not feel that it is safe for her to take him home tomorrow. She expressed concern over his pain meds being doubled in dose. She is also very concerned about his oxygenation.

## 2019-10-11 NOTE — DISCHARGE INSTRUCTIONS
Discharge Instructions    Discharged to home by car with relative. Discharged via wheelchair, hospital escort: Yes.  Special equipment needed: Not Applicable    Be sure to schedule a follow-up appointment with your primary care doctor or any specialists as instructed.     Discharge Plan:   Influenza Vaccine Indication: Not indicated: Previously immunized this influenza season and > 8 years of age    I understand that a diet low in cholesterol, fat, and sodium is recommended for good health. Unless I have been given specific instructions below for another diet, I accept this instruction as my diet prescription.   Other diet: Regular    Special Instructions: None    · Is patient discharged on Warfarin / Coumadin?   No     Depression / Suicide Risk    As you are discharged from this [facility] facility, it is important to learn how to keep safe from harming yourself.    Recognize the warning signs:  · Abrupt changes in personality, positive or negative- including increase in energy   · Giving away possessions  · Change in eating patterns- significant weight changes-  positive or negative  · Change in sleeping patterns- unable to sleep or sleeping all the time   · Unwillingness or inability to communicate  · Depression  · Unusual sadness, discouragement and loneliness  · Talk of wanting to die  · Neglect of personal appearance   · Rebelliousness- reckless behavior  · Withdrawal from people/activities they love  · Confusion- inability to concentrate     If you or a loved one observes any of these behaviors or has concerns about self-harm, here's what you can do:  · Talk about it- your feelings and reasons for harming yourself  · Remove any means that you might use to hurt yourself (examples: pills, rope, extension cords, firearm)  · Get professional help from the community (Mental Health, Substance Abuse, psychological counseling)  · Do not be alone:Call your Safe Contact- someone whom you trust who will be there for  you.  · Call your local CRISIS HOTLINE 228-9600 or 959-419-1744  · Call your local Children's Mobile Crisis Response Team [place] [phone]) 087-2381 or www.SOL ELIX[facility]  · Call the toll free National Suicide Prevention Hotlines   · National Suicide Prevention Lifeline 669-141-HTOI (0684)  · Singular Line Network 800-SUICIDE (914-3888)    Acetaminophen; Oxycodone tablets  Brand Names: Endocet 10mg-325mg Tablet, Endocet 10mg-650mg Tablet, Endocet 2.5mg-325mg Tablet, Endocet 5mg-325mg Tablet, Endocet 7.5mg-325mg Tablet, Endocet 7.5mg-500mg Tablet, Magnacet 10mg-400mg Tablet, Magnacet 2.5mg-400mg Tablet, Magnacet 5mg-400mg Tablet, Magnacet 7.5mg-400mg Tablet, Nalocet 2.5mg-300mg Tablet, Narvox 10mg-500mg Tablet, Oxycodone Hydrochloride/Acetaminophen 10mg-325mg Tablet, Oxycodone Hydrochloride/Acetaminophen 10mg-650mg Tablet, Oxycodone Hydrochloride/Acetaminophen 2.5mg-325mg Tablet, Oxycodone Hydrochloride/Acetaminophen 5mg-325mg Tablet, Oxycodone Hydrochloride/Acetaminophen 7.5mg-325mg Tablet, Oxycodone Hydrochloride/Acetaminophen 7.5mg-500mg Tablet, Percocet 10mg-325mg Tablet, Percocet 10mg-650mg Tablet,     What is this medicine?  ACETAMINOPHEN; OXYCODONE (a set a SOY erika fen; ox i KOE done) is a pain reliever. It is used to treat moderate to severe pain.  How should I use this medicine?  Take this medicine by mouth with a full glass of water. Follow the directions on the prescription label. You can take it with or without food. If it upsets your stomach, take it with food. Take your medicine at regular intervals. Do not take it more often than directed.  A special MedGuide will be given to you by the pharmacist with each prescription and refill. Be sure to read this information carefully each time.  Talk to your pediatrician regarding the use of this medicine in children. Special care may be needed.  What side effects may I notice from receiving this medicine?  Side effects that you should report to your  doctor or health care professional as soon as possible:  · allergic reactions like skin rash, itching or hives, swelling of the face, lips, or tongue  · breathing problems  · confusion  · redness, blistering, peeling or loosening of the skin, including inside the mouth  · signs and symptoms of liver injury like dark yellow or brown urine; general ill feeling or flu-like symptoms; light-colored stools; loss of appetite; nausea; right upper belly pain; unusually weak or tired; yellowing of the eyes or skin  · signs and symptoms of low blood pressure like dizziness; feeling faint or lightheaded, falls; unusually weak or tired  · trouble passing urine or change in the amount of urine  Side effects that usually do not require medical attention (report to your doctor or health care professional if they continue or are bothersome):  · constipation  · dry mouth  · nausea, vomiting  · tiredness  What may interact with this medicine?  This medicine may interact with the following medications:  · alcohol  · antihistamines for allergy, cough and cold  · antiviral medicines for HIV or AIDS  · atropine  · certain antibiotics like clarithromycin, erythromycin, linezolid, rifampin  · certain medicines for anxiety or sleep  · certain medicines for bladder problems like oxybutynin, tolterodine  · certain medicines for depression like amitriptyline, fluoxetine, sertraline  · certain medicines for fungal infections like ketoconazole, itraconazole, voriconazole  · certain medicines for migraine headache like almotriptan, eletriptan, frovatriptan, naratriptan, rizatriptan, sumatriptan, zolmitriptan  · certain medicines for nausea or vomiting like dolasetron, ondansetron, palonosetron  · certain medicines for Parkinson's disease like benztropine, trihexyphenidyl  · certain medicines for seizures like phenobarbital, phenytoin, primidone  · certain medicines for stomach problems like dicyclomine, hyoscyamine  · certain medicines for travel  sickness like scopolamine  · diuretics  · general anesthetics like halothane, isoflurane, methoxyflurane, propofol  · ipratropium  · local anesthetics like lidocaine, pramoxine, tetracaine  · MAOIs like Carbex, Eldepryl, Marplan, Nardil, and Parnate  · medicines that relax muscles for surgery  · methylene blue  · nilotinib  · other medicines with acetaminophen  · other narcotic medicines for pain or cough  · phenothiazines like chlorpromazine, mesoridazine, prochlorperazine, thioridazine  What if I miss a dose?  If you miss a dose, take it as soon as you can. If it is almost time for your next dose, take only that dose. Do not take double or extra doses.  Where should I keep my medicine?  Keep out of the reach of children. This medicine can be abused. Keep your medicine in a safe place to protect it from theft. Do not share this medicine with anyone. Selling or giving away this medicine is dangerous and against the law.  Store at room temperature between 20 and 25 degrees C (68 and 77 degrees F).  This medicine may cause harm and death if it is taken by other adults, children, or pets. Return medicine that has not been used to an official disposal site. Contact the [facility] at [phone] or your [facility] government to find a site. If you cannot return the medicine, flush it down the toilet. Do not use the medicine after the expiration date.  What should I tell my health care provider before I take this medicine?  They need to know if you have any of these conditions:  · brain tumor  · Crohn's disease, inflammatory bowel disease, or ulcerative colitis  · drug abuse or addiction  · head injury  · heart or circulation problems  · if you often drink alcohol  · kidney disease or problems going to the bathroom  · liver disease  · lung disease, asthma, or breathing problems  · an unusual or allergic reaction to acetaminophen, oxycodone, other opioid analgesics, other medicines, foods, dyes, or preservatives  · pregnant  or trying to get pregnant  · breast-feeding  What should I watch for while using this medicine?  Tell your doctor or health care professional if your pain does not go away, if it gets worse, or if you have new or a different type of pain. You may develop tolerance to the medicine. Tolerance means that you will need a higher dose of the medication for pain relief. Tolerance is normal and is expected if you take this medicine for a long time.  Do not suddenly stop taking your medicine because you may develop a severe reaction. Your body becomes used to the medicine. This does NOT mean you are addicted. Addiction is a behavior related to getting and using a drug for a non-medical reason. If you have pain, you have a medical reason to take pain medicine. Your doctor will tell you how much medicine to take. If your doctor wants you to stop the medicine, the dose will be slowly lowered over time to avoid any side effects.  There are different types of narcotic medicines (opiates). If you take more than one type at the same time or if you are taking another medicine that also causes drowsiness, you may have more side effects. Give your health care provider a list of all medicines you use. Your doctor will tell you how much medicine to take. Do not take more medicine than directed. Call emergency for help if you have problems breathing or unusual sleepiness.  Do not take other medicines that contain acetaminophen with this medicine. Always read labels carefully. If you have questions, ask your doctor or pharmacist.  If you take too much acetaminophen get medical help right away. Too much acetaminophen can be very dangerous and cause liver damage. Even if you do not have symptoms, it is important to get help right away.  You may get drowsy or dizzy. Do not drive, use machinery, or do anything that needs mental alertness until you know how this medicine affects you. Do not stand or sit up quickly, especially if you are an  "older patient. This reduces the risk of dizzy or fainting spells. Alcohol may interfere with the effect of this medicine. Avoid alcoholic drinks.  The medicine will cause constipation. Try to have a bowel movement at least every 2 to 3 days. If you do not have a bowel movement for 3 days, call your doctor or health care professional.  Your mouth may get dry. Chewing sugarless gum or sucking hard candy, and drinking plenty or water may help. Contact your doctor if the problem does not go away or is severe.  <HTML><META HTTP-EQUIV=\"content-type\" CONTENT=\"text/html;charset=utf-8\"><p class=\"DocumentNumber\">1479</DIV><p class=\"HSBody \" id=\"HSBody\" dir=\"\" data-kswid=\"HSBody\"><H1>Tizanidine tablets or capsules</H1><p><SPAN style=\"font-size: 11px; font-weight: 600;\">Brand Names:</SPAN>     <SPAN style=\"line-height: 14px; font-size: 13px; font-weight: normal; margin-left: 5px; display: inline-block;\">Tizanidine Hydrochloride 2mg Capsule, Tizanidine Hydrochloride 2mg Tablet, Tizanidine Hydrochloride 4mg Capsule, Tizanidine Hydrochloride 4mg Tablet, Tizanidine Hydrochloride 6mg Capsule, Zanaflex 2mg Capsule, Zanaflex 2mg Tablet, Zanaflex 4mg Capsule, Zanaflex 4mg Tablet, Zanaflex 6mg Capsule</SPAN>   </DIV><BR><p><H2>What is this medicine?</H2><P align=\"\">TIZANIDINE (sonia sarkar) helps to relieve muscle spasms. It may be used to help in the treatment of multiple sclerosis and spinal cord injury.</P></DIV><BR clear=\"all\"><p><H2>How should I use this medicine?</H2><P align=\"\">Take this medicine by mouth with a full glass of water. Take this medicine on an empty stomach, at least 30 minutes before or 2 hours after food. Do not take with food unless you talk with your doctor. Follow the directions on the prescription label. Take your medicine at regular intervals. Do not take your medicine more often than directed. Do not stop taking except on your doctor's advice. Suddenly stopping the medicine can be very dangerous.</P><P " "align=\"\">Talk to your pediatrician regarding the use of this medicine in children.</P><P align=\"\">Patients over 65 years old may have a stronger reaction and need a smaller dose.</P></DIV><BR clear=\"all\"><p><H2>What side effects may I notice from receiving this medicine?</H2><P align=\"\">Side effects that you should report to your doctor or health care professional as soon as possible:</P><UL><LI><P align=\"\">allergic reactions like skin rash, itching or hives, swelling of the face, lips, or tongue</P>       </LI><LI><P align=\"\">breathing problems</P>       </LI><LI><P align=\"\">hallucinations</P>       </LI><LI><P align=\"\">signs and symptoms of liver injury like dark yellow or brown urine; general ill feeling or flu-like symptoms; light-colored stools; loss of appetite; nausea; right upper quadrant belly pain; unusually weak or tired; yellowing of the eyes or skin</P>       </LI><LI><P align=\"\">signs and symptoms of low blood pressure like dizziness; feeling faint or lightheaded, falls; unusually weak or tired</P>       </LI><LI><P align=\"\">unusually slow heartbeat</P>       </LI><LI><P align=\"\">unusually weak or tired</P>       </LI></UL><P align=\"\">Side effects that usually do not require medical attention (report to your doctor or health care professional if they continue or are bothersome):</P><UL><LI><P align=\"\">blurred vision</P>       </LI><LI><P align=\"\">constipation</P>       </LI><LI><P align=\"\">dizziness</P>       </LI><LI><P align=\"\">dry mouth</P>       </LI><LI><P align=\"\">tiredness</P>       </LI></UL></DIV><BR clear=\"all\"><p><H2>What may interact with this medicine?</H2><P align=\"\">Do not take this medicine with any of the following medications:</P><UL><LI><P align=\"\">ciprofloxacin</P>       </LI><LI><P align=\"\">fluvoxamine</P>       </LI><LI><P align=\"\">narcotic medicines for cough</P>       </LI><LI><P align=\"\">thiabendazole</P>       </LI></UL><P align=\"\">This medicine may also interact with the " "following medications:</P><UL><LI><P align=\"\">acyclovir</P>       </LI><LI><P align=\"\">alcohol</P>       </LI><LI><P align=\"\">antihistamines for allergy, cough, and cold</P>       </LI><LI><P align=\"\">baclofen</P>       </LI><LI><P align=\"\">certain medicines for anxiety or sleep</P>       </LI><LI><P align=\"\">certain medicines for blood pressure, heart disease, irregular heartbeat</P>       </LI><LI><P align=\"\">certain medicines for depression like amitriptyline, fluoxetine, sertraline</P>       </LI><LI><P align=\"\">certain medicines for seizures like phenobarbital, primidone</P>       </LI><LI><P align=\"\">certain medicines for stomach problems like cimetidine, famotidine</P>       </LI><LI><P align=\"\">female hormones, like estrogens or progestins and birth control pills, patches, rings, or injections</P>       </LI><LI><P align=\"\">general anesthetics like halothane, isoflurane, methoxyflurane, propofol</P>       </LI><LI><P align=\"\">local anesthetics like lidocaine, pramoxine, tetracaine</P>       </LI><LI><P align=\"\">medicines that relax muscles for surgery</P>       </LI><LI><P align=\"\">narcotic medicines for pain</P>       </LI><LI><P align=\"\">phenothiazines like chlorpromazine, mesoridazine, prochlorperazine</P>       </LI><LI><P align=\"\">ticlopidine</P>       </LI><LI><P align=\"\">zileuton</P>       </LI></UL></DIV><BR clear=\"all\"><p><H2>What if I miss a dose?</H2><P align=\"\">If you miss a dose, take it as soon as you can. If it is almost time for your next dose, take only that dose. Do not take double or extra doses.</P></DIV><BR clear=\"all\"><p><H2>Where should I keep my medicine?</H2><P align=\"\">Keep out of the reach of children.</P><P align=\"\">Store at room temperature between 15 and 30 degrees C (59 and 86 degrees F). Throw away any unused medicine after the expiration date.</P></DIV><BR clear=\"all\"><p><H2>What should I tell my health care provider before I take this medicine?</H2><P align=\"\">They need to " "know if you have any of these conditions:</P><UL><LI><P align=\"\">kidney disease</P>       </LI><LI><P align=\"\">liver disease</P>       </LI><LI><P align=\"\">low blood pressure</P>       </LI><LI><P align=\"\">mental disorder</P>       </LI><LI><P align=\"\">an unusual or allergic reaction to tizanidine, other medicines, lactose (tablets only), foods, dyes, or preservatives</P>       </LI><LI><P align=\"\">pregnant or trying to get pregnant</P>       </LI><LI><P align=\"\">breast-feeding</P>       </LI></UL></DIV><BR clear=\"all\"><p><H2>What should I watch for while using this medicine?</H2><P align=\"\">Tell your doctor or health care professional if your symptoms do not start to get better or if they get worse.</P><P align=\"\">You may get drowsy or dizzy. Do not drive, use machinery, or do anything that needs mental alertness until you know how this medicine affects you. Do not stand or sit up quickly, especially if you are an older patient. This reduces the risk of dizzy or fainting spells. Alcohol may interfere with the effect of this medicine. Avoid alcoholic drinks.</P><P align=\"\">If you are taking another medicine that also causes drowsiness, you may have more side effects. Give your health care provider a list of all medicines you use. Your doctor will tell you how much medicine to take. Do not take more medicine than directed. Call emergency for help if you have problems breathing or unusual sleepiness.</P><P align=\"\">Your mouth may get dry. Chewing sugarless gum or sucking hard candy, and drinking plenty of water may help. Contact your doctor if the problem does not go away or is severe.</P></DIV></DIV>      "

## 2019-10-11 NOTE — THERAPY
"Physical Therapy Treatment completed.   Bed Mobility:  Supine to Sit: Supervised  Transfers: Sit to Stand: Supervised  Gait: Level Of Assist: Supervised with Front-Wheel Walker       Plan of Care: Will benefit from Physical Therapy 4 times per week  Discharge Recommendations: Equipment: No Equipment Needed. Post-acute therapy  Recommend home health transitional care for continued physical therapy services.   See \"Rehab Therapy-Acute\" Patient Summary Report for complete documentation.     Pt was pleasant and eager to participate in therapy session. He was able to cite spinal precautions and performed throughout tx session. He was able to complete bed mobility with extra time via log rolling technique at SPV level. He was able to increase total gait distance with fww and SPV with no LOB. Educated on safety with fww, pt deferring practicing single step reporting he does not feel he will have an issue. Educated while in hospital to continue to mobilize with nursing staff. Per caroline continue to recommend home health transitional care at [place].   "

## 2019-10-11 NOTE — CARE PLAN
Problem: Communication  Goal: The ability to communicate needs accurately and effectively will improve  Outcome: MET     Problem: Safety  Goal: Will remain free from injury  Outcome: MET  Goal: Will remain free from falls  Outcome: MET     Problem: Infection  Goal: Will remain free from infection  Outcome: MET     Problem: Venous Thromboembolism (VTW)/Deep Vein Thrombosis (DVT) Prevention:  Goal: Patient will participate in Venous Thrombosis (VTE)/Deep Vein Thrombosis (DVT)Prevention Measures  Outcome: MET     Problem: Bowel/Gastric:  Goal: Normal bowel function is maintained or improved  Outcome: MET  Goal: Will not experience complications related to bowel motility  Outcome: MET     Problem: Knowledge Deficit  Goal: Knowledge of disease process/condition, treatment plan, diagnostic tests, and medications will improve  Outcome: MET  Goal: Knowledge of the prescribed therapeutic regimen will improve  Outcome: MET     Problem: Discharge Barriers/Planning  Goal: Patient's continuum of care needs will be met  Outcome: MET     Problem: Pain Management  Goal: Pain level will decrease to patient's comfort goal  Outcome: MET     Problem: Fluid Volume:  Goal: Will maintain balanced intake and output  Outcome: MET     Problem: Respiratory:  Goal: Respiratory status will improve  Outcome: MET     Problem: Psychosocial Needs:  Goal: Level of anxiety will decrease  Outcome: MET     Problem: Urinary Elimination:  Goal: Ability to reestablish a normal urinary elimination pattern will improve  Outcome: MET     Problem: Mobility  Goal: Risk for activity intolerance will decrease  Outcome: MET     Patient is being discharged today.

## 2019-11-01 ENCOUNTER — HOSPITAL ENCOUNTER (OUTPATIENT)
Dept: RADIOLOGY | Facility: MEDICAL CENTER | Age: 74
End: 2019-11-01
Attending: NEUROLOGICAL SURGERY
Payer: MEDICARE

## 2019-11-01 DIAGNOSIS — M48.062 SPINAL STENOSIS, LUMBAR REGION, WITH NEUROGENIC CLAUDICATION: ICD-10-CM

## 2019-11-01 PROCEDURE — 72100 X-RAY EXAM L-S SPINE 2/3 VWS: CPT

## 2019-11-27 RX ORDER — ALBUTEROL SULFATE 90 UG/1
2 AEROSOL, METERED RESPIRATORY (INHALATION) EVERY 6 HOURS PRN
Qty: 8.5 G | Refills: 11 | Status: SHIPPED | OUTPATIENT
Start: 2019-11-27 | End: 2020-12-21

## 2020-02-04 ENCOUNTER — HOSPITAL ENCOUNTER (OUTPATIENT)
Dept: RADIOLOGY | Facility: MEDICAL CENTER | Age: 75
End: 2020-02-04
Attending: NEUROLOGICAL SURGERY
Payer: MEDICARE

## 2020-02-04 DIAGNOSIS — M47.896 OTHER SPONDYLOSIS, LUMBAR REGION: ICD-10-CM

## 2020-02-04 PROCEDURE — 72100 X-RAY EXAM L-S SPINE 2/3 VWS: CPT

## 2020-07-07 ENCOUNTER — OFFICE VISIT (OUTPATIENT)
Dept: MEDICAL GROUP | Facility: MEDICAL CENTER | Age: 75
End: 2020-07-07
Payer: MEDICARE

## 2020-07-07 VITALS
HEART RATE: 87 BPM | OXYGEN SATURATION: 97 % | DIASTOLIC BLOOD PRESSURE: 72 MMHG | WEIGHT: 188 LBS | HEIGHT: 69 IN | RESPIRATION RATE: 16 BRPM | TEMPERATURE: 97.4 F | BODY MASS INDEX: 27.85 KG/M2 | SYSTOLIC BLOOD PRESSURE: 124 MMHG

## 2020-07-07 DIAGNOSIS — H91.93 BILATERAL HEARING LOSS, UNSPECIFIED HEARING LOSS TYPE: ICD-10-CM

## 2020-07-07 DIAGNOSIS — Z13.6 SCREENING FOR CARDIOVASCULAR CONDITION: ICD-10-CM

## 2020-07-07 DIAGNOSIS — L30.9 HAND DERMATITIS: ICD-10-CM

## 2020-07-07 DIAGNOSIS — J44.9 COPD MIXED TYPE (HCC): ICD-10-CM

## 2020-07-07 DIAGNOSIS — G47.30 SLEEP DISORDER BREATHING: ICD-10-CM

## 2020-07-07 PROCEDURE — 99214 OFFICE O/P EST MOD 30 MIN: CPT | Performed by: NURSE PRACTITIONER

## 2020-07-07 RX ORDER — OXYCODONE AND ACETAMINOPHEN 10; 325 MG/1; MG/1
1 TABLET ORAL EVERY 4 HOURS PRN
COMMUNITY

## 2020-07-07 RX ORDER — TRIAMCINOLONE ACETONIDE 1 MG/G
1 OINTMENT TOPICAL 2 TIMES DAILY
Qty: 1 TUBE | Refills: 0 | Status: SHIPPED | OUTPATIENT
Start: 2020-07-07 | End: 2021-01-18

## 2020-07-07 ASSESSMENT — ENCOUNTER SYMPTOMS
SHORTNESS OF BREATH: 1
INSOMNIA: 1

## 2020-07-07 ASSESSMENT — FIBROSIS 4 INDEX: FIB4 SCORE: 1.91

## 2020-07-07 ASSESSMENT — PATIENT HEALTH QUESTIONNAIRE - PHQ9: CLINICAL INTERPRETATION OF PHQ2 SCORE: 0

## 2020-07-07 NOTE — PROGRESS NOTES
Subjective:      Bayron Parikh is a 75 y.o. male who presents with Rash (both hands x 1 month)        CC: Patient here today to discuss bilateral hand rash, hearing loss, COPD and possible sleep apnea.    HPI       1. Hand dermatitis  Patient has history of eczema and states that his hands bilaterally have developed a rash and have been pruritic for about a month.  He made an appointment with dermatology but it is 2 months in the future.  He does not know what triggered this except that he works out in his yard frequently and wears gloves and his hands do sweat.  The rash is not spread elsewhere.  He has tried over-the-counter hydrocortisone cream which helps slightly.  He states he also used an antifungal for a week but it did not help.    2. Bilateral hearing loss, unspecified hearing loss type  Patient states his hearing has been decreasing bilaterally and his wife advised him to see an audiologist to have his hearing checked.    3. Sleep disorder breathing  Patient states that he gradually has been having more problems with sleeping at night.  He states once he falls asleep he will frequently have apnea episodes and wake frequently.  He often snores loudly as well.  He often has to sleep sitting up in a chair.  He states when he was in the hospital for his back surgery last year his wife noted that his pulse ox would drop down into the 70s at night, especially when he was laying on his back.    4. COPD mixed type (HCC)  Patient has history of COPD but does not smoke any longer.  From her previous office he was prescribed Spiriva and even with using this daily he states he often needs to use his albuterol at least twice a day.  His last PFT from 2018 showed moderate obstructive defect with partial bronchodilator change suggestive of COPD.  A follow-up PFT was ordered in 2019 but not completed.    5. Screening for cardiovascular condition  Patient will need lipid panel in October  Past Medical History:    Diagnosis Date   • Anesthesia    • Arthritis    • Asthma     daily inhaler   • Cold 2019   • Dental disorder     full dentures   • Emphysema of lung (HCC)    • Heart burn    • Hypertension    • Pain     back   • Pneumonia    • Psychiatric problem     anxiety   • Shingles 2018   • Shingles 2018     Social History     Socioeconomic History   • Marital status:      Spouse name: Not on file   • Number of children: Not on file   • Years of education: Not on file   • Highest education level: Not on file   Occupational History   • Not on file   Social Needs   • Financial resource strain: Not on file   • Food insecurity     Worry: Not on file     Inability: Not on file   • Transportation needs     Medical: Not on file     Non-medical: Not on file   Tobacco Use   • Smoking status: Former Smoker     Packs/day: 1.00     Years: 39.00     Pack years: 39.00     Types: Cigarettes     Last attempt to quit: 2000     Years since quittin.6   • Smokeless tobacco: Never Used   • Tobacco comment: started smoking at age 16   Substance and Sexual Activity   • Alcohol use: No   • Drug use: No   • Sexual activity: Yes     Partners: Female   Lifestyle   • Physical activity     Days per week: Not on file     Minutes per session: Not on file   • Stress: Not on file   Relationships   • Social connections     Talks on phone: Not on file     Gets together: Not on file     Attends [employer] service: Not on file     Active member of club or organization: Not on file     Attends meetings of clubs or organizations: Not on file     Relationship status: Not on file   • Intimate partner violence     Fear of current or ex partner: Not on file     Emotionally abused: Not on file     Physically abused: Not on file     Forced sexual activity: Not on file   Other Topics Concern   • Not on file   Social History Narrative   • Not on file     Current Outpatient Medications   Medication Sig Dispense Refill   • triamcinolone  "acetonide (KENALOG) 0.1 % Ointment Apply 1 Application to affected area(s) 2 times a day. 1 Tube 0   • oxyCODONE-acetaminophen (PERCOCET-10)  MG Tab Take 1 Tab by mouth every four hours as needed for Severe Pain.     • fluticasone-salmeterol (ADVAIR) 100-50 MCG/DOSE AEROSOL POWDER, BREATH ACTIVATED Inhale 1 Puff by mouth every 12 hours. 1 Inhaler 11   • albuterol 108 (90 Base) MCG/ACT Aero Soln inhalation aerosol Inhale 2 Puffs by mouth every 6 hours as needed. 8.5 g 11   • tizanidine (ZANAFLEX) 4 MG Tab Take 1 Tab by mouth every 6 hours as needed. 30 Tab 0   • gabapentin (NEURONTIN) 300 MG Cap Take 900 mg by mouth 3 times a day.     • Polyethylene Glycol 3350 (MIRALAX PO) Take  by mouth.     • tiotropium (SPIRIVA) 18 MCG Cap Inhale 1 Cap by mouth every day. 90 Cap 3   • atorvastatin (LIPITOR) 40 MG Tab Take 1 Tab by mouth every day. 90 Tab 3   • cloNIDine (CATAPRES) 0.1 MG Tab Take 0.1 mg by mouth 2 times a day.       No current facility-administered medications for this visit.      Family History   Problem Relation Age of Onset   • Cancer Father         Esophogial/smoker   • No Known Problems Mother    • No Known Problems Brother    • No Known Problems Son    • No Known Problems Daughter    • No Known Problems Daughter    • No Known Problems Maternal Grandmother    • No Known Problems Maternal Grandfather    • No Known Problems Paternal Grandmother    • No Known Problems Paternal Grandfather          Review of Systems   HENT: Positive for hearing loss.    Respiratory: Positive for shortness of breath.    Skin: Positive for itching and rash.   Psychiatric/Behavioral: The patient has insomnia.    All other systems reviewed and are negative.         Objective:     /72 (BP Location: Right arm, Patient Position: Sitting, BP Cuff Size: Adult)   Pulse 87   Temp 36.3 °C (97.4 °F) (Temporal)   Resp 16   Ht 1.753 m (5' 9\")   Wt 85.3 kg (188 lb)   SpO2 97%   BMI 27.76 kg/m²      Physical Exam  Vitals signs " and nursing note reviewed.   Constitutional:       General: He is not in acute distress.     Appearance: He is well-developed. He is not diaphoretic.   HENT:      Head: Normocephalic and atraumatic.      Comments: Partial cerumen impaction left ear canal     Right Ear: External ear normal.      Left Ear: External ear normal.      Nose: Nose normal.   Eyes:      General:         Right eye: No discharge.         Left eye: No discharge.      Conjunctiva/sclera: Conjunctivae normal.   Neck:      Musculoskeletal: Normal range of motion and neck supple.      Thyroid: No thyromegaly.      Trachea: No tracheal deviation.   Cardiovascular:      Rate and Rhythm: Normal rate and regular rhythm.      Heart sounds: Normal heart sounds. No murmur.   Pulmonary:      Effort: Pulmonary effort is normal. No respiratory distress.      Breath sounds: Normal breath sounds. No wheezing or rales.   Lymphadenopathy:      Cervical: No cervical adenopathy.   Skin:     General: Skin is warm and dry.      Findings: No erythema or rash.      Comments: Scaly, erythematous areas on the posterior hands bilaterally.   Neurological:      Mental Status: He is alert and oriented to person, place, and time.      Coordination: Coordination normal.   Psychiatric:         Behavior: Behavior normal.         Thought Content: Thought content normal.         Judgment: Judgment normal.                 Assessment/Plan:       1. Hand dermatitis  Patient has an appointment with his dermatologist in 2 months but in the meantime I advised him to avoid anything that may be triggering his rash including certain soaps and to avoid using these.  He may also wish to avoid using his gloves for a while.  He may use triamcinolone for up to 2 weeks.  - triamcinolone acetonide (KENALOG) 0.1 % Ointment; Apply 1 Application to affected area(s) 2 times a day.  Dispense: 1 Tube; Refill: 0    2. Bilateral hearing loss, unspecified hearing loss type  Patient given referral as  requested and advised him to use over-the-counter Debrox system.  - REFERRAL TO AUDIOLOGY    3. Sleep disorder breathing  Patient appears to have sleep apnea symptoms and previous hypoxemia at night when he was in the hospital and needs to be evaluated.  I reviewed with him potential risks of untreated sleep apnea  - REFERRAL TO PULMONARY AND SLEEP MEDICINE Sleep Medicine  - Comp Metabolic Panel; Future  - CBC WITHOUT DIFFERENTIAL; Future    4. COPD mixed type (HCC)  Patient is having to use his rescue inhaler at least twice a day despite his Spiriva and I will have him start on Advair Diskus as well and rinse his mouth out after usage.  He most likely will need a new pulmonary function test in the near future.  - Comp Metabolic Panel; Future  - CBC WITHOUT DIFFERENTIAL; Future  - fluticasone-salmeterol (ADVAIR) 100-50 MCG/DOSE AEROSOL POWDER, BREATH ACTIVATED; Inhale 1 Puff by mouth every 12 hours.  Dispense: 1 Inhaler; Refill: 11    5. Screening for cardiovascular condition  Patient will be due for lab work October.  - Lipid Profile; Future

## 2020-08-17 RX ORDER — KETOCONAZOLE 20 MG/G
CREAM TOPICAL BID
Qty: 1 | Refills: 2 | Status: ERX

## 2020-08-31 ENCOUNTER — APPOINTMENT (RX ONLY)
Dept: URBAN - METROPOLITAN AREA CLINIC 20 | Facility: CLINIC | Age: 75
Setting detail: DERMATOLOGY
End: 2020-08-31

## 2020-08-31 DIAGNOSIS — Z71.89 OTHER SPECIFIED COUNSELING: ICD-10-CM

## 2020-08-31 DIAGNOSIS — L30.8 OTHER SPECIFIED DERMATITIS: ICD-10-CM

## 2020-08-31 DIAGNOSIS — L82.1 OTHER SEBORRHEIC KERATOSIS: ICD-10-CM

## 2020-08-31 DIAGNOSIS — D22 MELANOCYTIC NEVI: ICD-10-CM

## 2020-08-31 DIAGNOSIS — L81.4 OTHER MELANIN HYPERPIGMENTATION: ICD-10-CM

## 2020-08-31 DIAGNOSIS — D18.0 HEMANGIOMA: ICD-10-CM

## 2020-08-31 PROBLEM — D22.62 MELANOCYTIC NEVI OF LEFT UPPER LIMB, INCLUDING SHOULDER: Status: ACTIVE | Noted: 2020-08-31

## 2020-08-31 PROBLEM — D22.39 MELANOCYTIC NEVI OF OTHER PARTS OF FACE: Status: ACTIVE | Noted: 2020-08-31

## 2020-08-31 PROBLEM — D22.61 MELANOCYTIC NEVI OF RIGHT UPPER LIMB, INCLUDING SHOULDER: Status: ACTIVE | Noted: 2020-08-31

## 2020-08-31 PROBLEM — D22.5 MELANOCYTIC NEVI OF TRUNK: Status: ACTIVE | Noted: 2020-08-31

## 2020-08-31 PROBLEM — D18.01 HEMANGIOMA OF SKIN AND SUBCUTANEOUS TISSUE: Status: ACTIVE | Noted: 2020-08-31

## 2020-08-31 PROBLEM — D22.71 MELANOCYTIC NEVI OF RIGHT LOWER LIMB, INCLUDING HIP: Status: ACTIVE | Noted: 2020-08-31

## 2020-08-31 PROBLEM — D22.72 MELANOCYTIC NEVI OF LEFT LOWER LIMB, INCLUDING HIP: Status: ACTIVE | Noted: 2020-08-31

## 2020-08-31 PROCEDURE — ? SUNSCREEN RECOMMENDATIONS

## 2020-08-31 PROCEDURE — ? ADDITIONAL NOTES

## 2020-08-31 PROCEDURE — 99214 OFFICE O/P EST MOD 30 MIN: CPT

## 2020-08-31 PROCEDURE — ? COUNSELING

## 2020-08-31 ASSESSMENT — LOCATION DETAILED DESCRIPTION DERM
LOCATION DETAILED: LEFT DISTAL POSTERIOR THIGH
LOCATION DETAILED: RIGHT VENTRAL PROXIMAL FOREARM
LOCATION DETAILED: LEFT PROXIMAL POSTERIOR UPPER ARM
LOCATION DETAILED: RIGHT SUPERIOR UPPER BACK
LOCATION DETAILED: RIGHT INFERIOR MEDIAL MIDBACK
LOCATION DETAILED: LEFT ULNAR DORSAL HAND
LOCATION DETAILED: RIGHT PROXIMAL PRETIBIAL REGION
LOCATION DETAILED: RIGHT INFERIOR CENTRAL MALAR CHEEK
LOCATION DETAILED: RIGHT DISTAL POSTERIOR THIGH
LOCATION DETAILED: RIGHT RADIAL DORSAL HAND
LOCATION DETAILED: INFERIOR THORACIC SPINE
LOCATION DETAILED: LEFT VENTRAL PROXIMAL FOREARM
LOCATION DETAILED: RIGHT DISTAL POSTERIOR UPPER ARM
LOCATION DETAILED: RIGHT INFERIOR MEDIAL UPPER BACK
LOCATION DETAILED: LEFT LATERAL PROXIMAL PRETIBIAL REGION
LOCATION DETAILED: RIGHT INFERIOR FOREHEAD

## 2020-08-31 ASSESSMENT — LOCATION ZONE DERM
LOCATION ZONE: LEG
LOCATION ZONE: TRUNK
LOCATION ZONE: FACE
LOCATION ZONE: ARM
LOCATION ZONE: HAND

## 2020-08-31 ASSESSMENT — LOCATION SIMPLE DESCRIPTION DERM
LOCATION SIMPLE: RIGHT UPPER BACK
LOCATION SIMPLE: LEFT FOREARM
LOCATION SIMPLE: LEFT POSTERIOR THIGH
LOCATION SIMPLE: RIGHT LOWER BACK
LOCATION SIMPLE: RIGHT FOREHEAD
LOCATION SIMPLE: LEFT POSTERIOR UPPER ARM
LOCATION SIMPLE: RIGHT HAND
LOCATION SIMPLE: LEFT HAND
LOCATION SIMPLE: RIGHT FOREARM
LOCATION SIMPLE: RIGHT CHEEK
LOCATION SIMPLE: UPPER BACK
LOCATION SIMPLE: RIGHT POSTERIOR THIGH
LOCATION SIMPLE: RIGHT POSTERIOR UPPER ARM
LOCATION SIMPLE: LEFT PRETIBIAL REGION
LOCATION SIMPLE: RIGHT PRETIBIAL REGION

## 2020-08-31 NOTE — PROCEDURE: ADDITIONAL NOTES
Detail Level: Detailed
Additional Notes: Pt went to PCP for hand Derm and was prescribed topical medication. \\nHas been using that medication to treat.\\nProvided pt with CeraVe samples

## 2020-09-10 DIAGNOSIS — E78.5 DYSLIPIDEMIA: ICD-10-CM

## 2020-09-11 RX ORDER — ATORVASTATIN CALCIUM 40 MG/1
40 TABLET, FILM COATED ORAL DAILY
Qty: 90 TAB | Refills: 3 | Status: SHIPPED | OUTPATIENT
Start: 2020-09-11 | End: 2021-08-02 | Stop reason: SDUPTHER

## 2020-09-22 RX ORDER — FLUOCINONIDE 0.5 MG/ML
SOLUTION TOPICAL
Qty: 1 | Refills: 6 | Status: ERX

## 2020-10-05 DIAGNOSIS — J44.9 COPD MIXED TYPE (HCC): ICD-10-CM

## 2020-10-05 RX ORDER — TIOTROPIUM BROMIDE 18 UG/1
18 CAPSULE ORAL; RESPIRATORY (INHALATION) DAILY
Qty: 90 CAP | Refills: 2 | Status: SHIPPED | OUTPATIENT
Start: 2020-10-05 | End: 2021-08-30

## 2020-10-26 ENCOUNTER — TELEMEDICINE (OUTPATIENT)
Dept: SLEEP MEDICINE | Facility: MEDICAL CENTER | Age: 75
End: 2020-10-26
Payer: MEDICARE

## 2020-10-26 VITALS — HEIGHT: 70 IN | BODY MASS INDEX: 26.48 KG/M2 | WEIGHT: 185 LBS

## 2020-10-26 DIAGNOSIS — G47.30 SLEEP DISORDER BREATHING: ICD-10-CM

## 2020-10-26 DIAGNOSIS — J44.9 COPD MIXED TYPE (HCC): ICD-10-CM

## 2020-10-26 ASSESSMENT — FIBROSIS 4 INDEX: FIB4 SCORE: 1.91

## 2020-10-26 NOTE — PROGRESS NOTES
"Telemedicine Visit: New Patient     This encounter was conducted via {Platform used:98655::\"Zoom \"}. Verbal consent was obtained. Patient's identity was verified.   Given the importance of social distancing and other strategies recommended to reduce the risk of COVID-19 transmission, I am providing medical care to this patient via audio/video visit in place of an in person visit at the request of the patient.        [facility]  Consult Note     Date: 10/26/2020 / Time: 2:05 PM    Patient ID:   Name:             Bayron Parikh   YOB: 1945  Age:                 75 y.o.  male   MRN:               [id number]      Thank you for requesting a sleep medicine consultation on Bayron Parikh at the sleep center. *** presents today with the chief complaints of snoring, *** occasional excessive daytime sleepiness. He is referred by *** for evaluation and treatment of sleep disorder breathing ***.     HISTORY OF PRESENT ILLNESS:       At night,  Bayron Parikh goes to bed around *** pm on weekdays and around *** pm on the weekends. He gets out of bed at *** am on weekdays and at *** am on the weekends.  He  Averages about *** hrs of sleep on a good night and *** hrs on a bad night. Pt has bad nights about *** nights per week. He falls asleep within *** minutes. He wakes up about *** times during the night due to *** and on average It takes *** *** min to fall back asleep. During that time He lies in bed/gets out of bed.  He  does/does not use the bed only for sleeping. Also, during that time  He  thinks about not getting enough sleep/worries about day-to-day problems.      As per suppose questioner,He is not*** aware of snoring/breathing pauses/gasping or choking in sleep.  He  denies any symptoms of restless legs syndrome such as an \"urge to move\"  He  legs in the evening or bedtime. He  denies any symptoms of narcolepsy such as sleep paralysis or cataplexy, or any symptoms to suggest parasomnias " such as sleep walking or acting out of dreams. He  has not used any medications for the sleep problem.    Overall,  He does *** doesnot*** finds his sleep refreshing. When He  wakes up in the morning, He does*** does/not*** feel tired. In terms of  excessive daytime sleepiness,  He complains of sleepiness while  at work, while reading or watching TV and occasionally while driving***. Garland sleepiness scale score is normal/abnormal*** at  ***.He  Does/not*** take regular naps. The naps are usually *** min long.He drinks about *** caffeinated beverages per day.      REVIEW OF SYSTEMS:       Constitutional: Denies fevers, Denies weight changes  Eyes: Denies changes in vision, no eye pain  Ears/Nose/Throat/Mouth: Denies nasal congestion or sore throat   Cardiovascular: Denies chest pain or palpitations   Respiratory: Denies shortness of breath , Denies cough  Gastrointestinal/Hepatic: Denies abdominal pain, nausea, vomiting, diarrhea, constipation or GI bleeding   Genitourinary: Denies bladder dysfunction, dysuria or frequency  Musculoskeletal/Rheum: Denies  joint pain and swelling   Skin/Breast: Denies rash  Neurological: Denies headache, confusion, memory loss or focal weakness/parasthesias  Psychiatric: denies mood disorder     Comprehensive review of systems form is reviewed with the patient and is attached in the EMR.     PMH:  has a past medical history of Anesthesia, Arthritis, Asthma, Chickenpox, Cold (01/2019), Dental disorder, Emphysema of lung (HCC), [place] measles, Heart burn, Hypertension, Pain, Pneumonia (2018), Psychiatric problem, Shingles (03/01/2018), and Shingles (05/01/2018).  MEDS:   Current Outpatient Medications:   •  ASPIRIN 81 PO, Take  by mouth., Disp: , Rfl:   •  tiotropium (SPIRIVA) 18 MCG Cap, Inhale 1 Cap by mouth every day., Disp: 90 Cap, Rfl: 2  •  atorvastatin (LIPITOR) 40 MG Tab, Take 1 Tab by mouth every day., Disp: 90 Tab, Rfl: 3  •  oxyCODONE-acetaminophen (PERCOCET-10)  MG  Tab, Take 1 Tab by mouth every four hours as needed for Severe Pain., Disp: , Rfl:   •  albuterol 108 (90 Base) MCG/ACT Aero Soln inhalation aerosol, Inhale 2 Puffs by mouth every 6 hours as needed., Disp: 8.5 g, Rfl: 11  •  tizanidine (ZANAFLEX) 4 MG Tab, Take 1 Tab by mouth every 6 hours as needed., Disp: 30 Tab, Rfl: 0  •  gabapentin (NEURONTIN) 300 MG Cap, Take 900 mg by mouth 3 times a day., Disp: , Rfl:   •  cloNIDine (CATAPRES) 0.1 MG Tab, Take 0.1 mg by mouth 2 times a day., Disp: , Rfl:   •  triamcinolone acetonide (KENALOG) 0.1 % Ointment, Apply 1 Application to affected area(s) 2 times a day. (Patient not taking: Reported on 10/26/2020), Disp: 1 Tube, Rfl: 0  •  fluticasone-salmeterol (ADVAIR) 100-50 MCG/DOSE AEROSOL POWDER, BREATH ACTIVATED, Inhale 1 Puff by mouth every 12 hours. (Patient not taking: Reported on 10/26/2020), Disp: 1 Inhaler, Rfl: 11  •  Polyethylene Glycol 3350 (MIRALAX PO), Take  by mouth., Disp: , Rfl:   ALLERGIES: No Known Allergies  SURGHX:   Past Surgical History:   Procedure Laterality Date   • LUMBAR FUSION O-ARM  10/9/2019    Procedure: FUSION, SPINE, LUMBAR, WITH O-ARM IMAGING GUIDANCE- L4-5 TLIF;  Surgeon: Nikolas Dooley M.D.;  Location: SURGERY [facility];  Service: Neurosurgery   • LUMBAR LAMINECTOMY DISKECTOMY  10/9/2019    Procedure: LAMINECTOMY, SPINE, LUMBAR, WITH DISCECTOMY- REDO L3-S1 LAMI;  Surgeon: Nikolas Dooley M.D.;  Location: SURGERY [facility];  Service: Neurosurgery   • LUMBAR DECOMPRESSION  10/9/2019    Procedure: DECOMPRESSION, SPINE, LUMBAR- RIGHT L5 TRANSPEDICULAR;  Surgeon: Nikolas Dooley M.D.;  Location: SURGERY [facility];  Service: Neurosurgery   • LUMBAR LAMINECTOMY DISKECTOMY  3/14/2019    Procedure: LUMBAR LAMINECTOMY DISKECTOMY- L4-5, MEDIAL FACETECTOMY;  Surgeon: Edmond Fung M.D.;  Location: SURGERY [facility];  Service: Neurosurgery   • FORAMINOTOMY Bilateral 3/14/2019    Procedure: FORAMINOTOMY;  Surgeon: Edmond DEE  "HARSH Fung;  Location: SURGERY [facility];  Service: Neurosurgery   • OTHER      T&A   • TONSILLECTOMY       SOCHX:  reports that he quit smoking about 19 years ago. His smoking use included cigarettes. He has a 39.00 pack-year smoking history. He has never used smokeless tobacco. He reports that he does not drink alcohol or use drugs.. Pt works as ***.   FH:   Family History   Problem Relation Age of Onset   • Cancer Father         Esophogial/smoker   • No Known Problems Mother    • No Known Problems Brother    • No Known Problems Son    • No Known Problems Daughter    • No Known Problems Daughter    • No Known Problems Maternal Grandmother    • No Known Problems Maternal Grandfather    • No Known Problems Paternal Grandmother    • No Known Problems Paternal Grandfather    • Sleep Apnea Neg Hx        Physical Exam:  Vitals/ General Appearance:   Weight/BMI: Body mass index is 26.54 kg/m².  Ht 1.778 m (5' 10\")   Wt 83.9 kg (185 lb)   Vitals:    10/26/20 1349   Weight: 83.9 kg (185 lb)   Height: 1.778 m (5' 10\")           Constitutional: Alert, no distress, well-groomed.  Skin: No rashes in visible areas.  Eye: Round. Conjunctiva clear, lids normal. No icterus.   ENMT: Lips pink without lesions, good dentition, moist mucous membranes. Phonation normal.  Neck: No masses, no thyromegaly. Moves freely without pain.  CV: Pulse as reported by patient  Respiratory: Unlabored respiratory effort, no cough or audible wheeze  Psych: Alert and oriented x3, normal affect and mood.   INVESTIGATIONS:       ASSESSMENT AND PLAN     1. He  has symptoms of Obstructive Sleep Apnea (BRANDEE). He  has excessive daytime sleepiness that *** interferes with activites of daily living. He  risk factors for BRANDEE include obesity***, thick neck***, and crowded oropharynx***.     The pathophysiology of BRANDEE and the increased risk of cardiovascular morbidity from untreated BRANDEE is discussed in detail with the patient. He  also has HTN***, heart " disease*** which can be worsened by her BRANDEE.     We have discussed diagnostic options including in-laboratory, attended polysomnography and home sleep testing. We have also discussed treatment options including airway pressurization, reconstructive otolaryngologic surgery, dental appliances and weight management.       Subsequently,treatment options will be discussed based on the diagnostic study. Meanwhile, He is urged to avoid supine sleep, weight gain and alcoholic beverages since all of these can worsen BRANDEE. He is cautioned against drowsy driving. If He feels sleepy while driving, He must pull over for a break/nap, rather than persist on the road, in the interest of He own safety and that of others on the road.    Plan  -  He  will be scheduled for an overnight PSG*** HST*** to assess sleep related  breathing disorder.    2. Regarding her previous history of ***, She is encouraged to follow-up with her specialist  _______.    3. Regarding treatment of other past medical problems and general health maintenance,  He is urged to follow up with PCP.

## 2020-10-26 NOTE — PATIENT INSTRUCTIONS
"CPAP and BPAP Information  CPAP and BPAP are methods of helping a person breathe with the use of air pressure. CPAP stands for \"continuous positive airway pressure.\" BPAP stands for \"bi-level positive airway pressure.\" In both methods, air is blown through your nose or mouth and into your air passages to help you breathe well.  CPAP and BPAP use different amounts of pressure to blow air. With CPAP, the amount of pressure stays the same while you breathe in and out. With BPAP, the amount of pressure is increased when you breathe in (inhale) so that you can take larger breaths. Your health care provider will recommend whether CPAP or BPAP would be more helpful for you.  Why are CPAP and BPAP treatments used?  CPAP or BPAP can be helpful if you have:  · Sleep apnea.  · Chronic obstructive pulmonary disease (COPD).  · Heart failure.  · Medical conditions that weaken the muscles of the chest including muscular dystrophy, or neurological diseases such as amyotrophic lateral sclerosis (ALS).  · Other problems that cause breathing to be weak, abnormal, or difficult.  CPAP is most commonly used for obstructive sleep apnea (BRANDEE) to keep the airways from collapsing when the muscles relax during sleep.  How is CPAP or BPAP administered?  Both CPAP and BPAP are provided by a small machine with a flexible plastic tube that attaches to a plastic mask. You wear the mask. Air is blown through the mask into your nose or mouth. The amount of pressure that is used to blow the air can be adjusted on the machine. Your health care provider will determine the pressure setting that should be used based on your individual needs.  When should CPAP or BPAP be used?  In most cases, the mask only needs to be worn during sleep. Generally, the mask needs to be worn throughout the night and during any daytime naps. People with certain medical conditions may also need to wear the mask at other times when they are awake. Follow instructions from your " health care provider about when to use the machine.  What are some tips for using the mask?    · Because the mask needs to be snug, some people feel trapped or closed-in (claustrophobic) when first using the mask. If you feel this way, you may need to get used to the mask. One way to do this is by holding the mask loosely over your nose or mouth and then gradually applying the mask more snugly. You can also gradually increase the amount of time that you use the mask.  · Masks are available in various types and sizes. Some fit over your mouth and nose while others fit over just your nose. If your mask does not fit well, talk with your health care provider about getting a different one.  · If you are using a mask that fits over your nose and you tend to breathe through your mouth, a chin strap may be applied to help keep your mouth closed.  · The CPAP and BPAP machines have alarms that may sound if the mask comes off or develops a leak.  · If you have trouble with the mask, it is very important that you talk with your health care provider about finding a way to make the mask easier to tolerate. Do not stop using the mask. Stopping the use of the mask could have a negative impact on your health.  What are some tips for using the machine?  · Place your CPAP or BPAP machine on a secure table or stand near an electrical outlet.  · Know where the on/off switch is located on the machine.  · Follow instructions from your health care provider about how to set the pressure on your machine and when you should use it.  · Do not eat or drink while the CPAP or BPAP machine is on. Food or fluids could get pushed into your lungs by the pressure of the CPAP or BPAP.  · Do not smoke. Tobacco smoke residue can damage the machine.  · For home use, CPAP and BPAP machines can be rented or purchased through home health care companies. Many different brands of machines are available. Renting a machine before purchasing may help you find out  which particular machine works well for you.  · Keep the CPAP or BPAP machine and attachments clean. Ask your health care provider for specific instructions.  Get help right away if:  · You have redness or open areas around your nose or mouth where the mask fits.  · You have trouble using the CPAP or BPAP machine.  · You cannot tolerate wearing the CPAP or BPAP mask.  · You have pain, discomfort, and bloating in your abdomen.  Summary  · CPAP and BPAP are methods of helping a person breathe with the use of air pressure.  · Both CPAP and BPAP are provided by a small machine with a flexible plastic tube that attaches to a plastic mask.  · If you have trouble with the mask, it is very important that you talk with your health care provider about finding a way to make the mask easier to tolerate.  This information is not intended to replace advice given to you by your health care provider. Make sure you discuss any questions you have with your health care provider.  Document Released: 09/15/2005 Document Revised: 04/08/2020 Document Reviewed: 11/06/2017  Elsevier Patient Education © 2020 [employer]

## 2020-12-21 RX ORDER — ALBUTEROL SULFATE 90 UG/1
2 AEROSOL, METERED RESPIRATORY (INHALATION) EVERY 6 HOURS PRN
Qty: 1 EACH | Refills: 11 | Status: SHIPPED | OUTPATIENT
Start: 2020-12-21 | End: 2021-08-02 | Stop reason: SDUPTHER

## 2021-01-11 DIAGNOSIS — Z23 NEED FOR VACCINATION: ICD-10-CM

## 2021-01-13 DIAGNOSIS — J44.9 COPD MIXED TYPE (HCC): ICD-10-CM

## 2021-01-13 DIAGNOSIS — G47.30 SLEEP DISORDER BREATHING: ICD-10-CM

## 2021-01-15 DIAGNOSIS — L30.9 HAND DERMATITIS: ICD-10-CM

## 2021-01-18 RX ORDER — TRIAMCINOLONE ACETONIDE 1 MG/G
OINTMENT TOPICAL
Qty: 15 G | Refills: 0 | Status: SHIPPED | OUTPATIENT
Start: 2021-01-18

## 2021-01-19 ENCOUNTER — SLEEP CENTER VISIT (OUTPATIENT)
Dept: SLEEP MEDICINE | Facility: MEDICAL CENTER | Age: 76
End: 2021-01-19
Payer: MEDICARE

## 2021-01-19 VITALS
RESPIRATION RATE: 16 BRPM | DIASTOLIC BLOOD PRESSURE: 82 MMHG | BODY MASS INDEX: 27.2 KG/M2 | OXYGEN SATURATION: 94 % | SYSTOLIC BLOOD PRESSURE: 144 MMHG | WEIGHT: 190 LBS | HEIGHT: 70 IN | HEART RATE: 85 BPM

## 2021-01-19 DIAGNOSIS — G89.29 CHRONIC BILATERAL LOW BACK PAIN WITH LEFT-SIDED SCIATICA: ICD-10-CM

## 2021-01-19 DIAGNOSIS — F51.01 PRIMARY INSOMNIA: ICD-10-CM

## 2021-01-19 DIAGNOSIS — R09.02 HYPOXIA: ICD-10-CM

## 2021-01-19 DIAGNOSIS — Z87.891 FORMER SMOKER: ICD-10-CM

## 2021-01-19 DIAGNOSIS — M54.42 CHRONIC BILATERAL LOW BACK PAIN WITH LEFT-SIDED SCIATICA: ICD-10-CM

## 2021-01-19 DIAGNOSIS — B02.29 POST HERPETIC NEURALGIA: ICD-10-CM

## 2021-01-19 DIAGNOSIS — Z92.29 UP TO DATE WITH INFLUENZA VACCINATION: ICD-10-CM

## 2021-01-19 DIAGNOSIS — G47.33 OSA (OBSTRUCTIVE SLEEP APNEA): ICD-10-CM

## 2021-01-19 DIAGNOSIS — J44.9 COPD MIXED TYPE (HCC): ICD-10-CM

## 2021-01-19 PROCEDURE — 94761 N-INVAS EAR/PLS OXIMETRY MLT: CPT | Performed by: INTERNAL MEDICINE

## 2021-01-19 PROCEDURE — 99204 OFFICE O/P NEW MOD 45 MIN: CPT | Performed by: INTERNAL MEDICINE

## 2021-01-19 RX ORDER — ZOLPIDEM TARTRATE 5 MG/1
5 TABLET ORAL NIGHTLY PRN
Qty: 2 TAB | Refills: 0 | Status: SHIPPED | OUTPATIENT
Start: 2021-01-19 | End: 2021-01-28

## 2021-01-19 ASSESSMENT — FIBROSIS 4 INDEX: FIB4 SCORE: 1.91

## 2021-01-19 NOTE — PROCEDURES
Multi-Ox Readings  Multi Ox #1 Room air   O2 sat % at rest 92   O2 sat % on exertion 86   O2 sat average on exertion     Multi Ox #2 2 LPM   O2 sat % at rest 92   O2 sat % on exertion 91   O2 sat average on exertion       Oxygen Use     Oxygen Frequency     Duration of need     Is the patient mobile within the home?     CPAP Use?     BIPAP Use?     Servo Titration

## 2021-01-19 NOTE — PROGRESS NOTES
CC: Evaluation of possible obstructive sleep apnea syndrome.    HPI:  Bayron Parikh is a 75 y.o. male kindly referred by DALLAS Elam who elicited a history of witnessed apneas, loud snoring, and nocturnal desaturation.  The patient has never had a sleep study and is not on any nocturnal oxygen.    He sleeps in a recliner but still feels like he is being suffocated at night. Cannot lie down as sats fall into the 60s. Wife noted that he appeared gray after sleeping on his back on the couch.  We will do a multi ox and see if he desaturates.  If so will prescribe oxygen.    Goes to bed at 2 AM and gets up at 8 AM.      Symptom Summary:  Snoring: +  Very loud snoring: +  Witnessed apneas: +  Resuscitative snorts: +  Nocturnal shortness of breath: +  Non-restorative sleep: +  Insomnia: +  Nocturnal awakenings: +  Nocturia: + times 2  EDS: +  Fatigue: +  Tiredness: +  Falls asleep accidentally: +   Napping or returning to bed after arising: +  Restless legs: -  Limb movements during sleep: +  Nocturnal headaches: + occasional    Significant comorbidities and modifying factors include postherpetic neuralgia, COPD, back pain, eczema, primary insomnia, former smoker, and up-to-date with influenza vaccination.      Patient Active Problem List    Diagnosis Date Noted   • Post herpetic neuralgia 05/23/2018     Priority: Medium   • BRANDEE (obstructive sleep apnea) 01/19/2021   • Former smoker 01/19/2021   • Up to date with influenza vaccination 01/19/2021   • COPD mixed type (HCC) 08/06/2018   • Erectile dysfunction 11/27/2017   • Chronic bilateral low back pain with left-sided sciatica 12/08/2016   • Eczema 12/08/2016   • Primary insomnia 12/08/2016       Past Medical History:   Diagnosis Date   • Anesthesia    • Apnea, sleep    • Arthritis    • Asthma     daily inhaler   • Chickenpox    • Cold 01/2019   • Daytime sleepiness    • Dental disorder     full dentures   • Emphysema of lung (HCC)    • Gasping for breath     • [place] measles    • Heart burn    • Hypertension    • Pain     back   • Pneumonia 2018   • Psychiatric problem     anxiety   • Shingles 03/01/2018   • Shingles 05/01/2018   • Snoring         Past Surgical History:   Procedure Laterality Date   • LUMBAR FUSION O-ARM  10/9/2019    Procedure: FUSION, SPINE, LUMBAR, WITH O-ARM IMAGING GUIDANCE- L4-5 TLIF;  Surgeon: Nikolas Dooley M.D.;  Location: SURGERY [facility];  Service: Neurosurgery   • LUMBAR LAMINECTOMY DISKECTOMY  10/9/2019    Procedure: LAMINECTOMY, SPINE, LUMBAR, WITH DISCECTOMY- REDO L3-S1 LAMI;  Surgeon: Nikolas Dooley M.D.;  Location: SURGERY [facility];  Service: Neurosurgery   • LUMBAR DECOMPRESSION  10/9/2019    Procedure: DECOMPRESSION, SPINE, LUMBAR- RIGHT L5 TRANSPEDICULAR;  Surgeon: Nikolas Dooley M.D.;  Location: SUR[facility]ERY [facility];  Service: Neurosurgery   • LUMBAR LAMINECTOMY DISKECTOMY  3/14/2019    Procedure: LUMBAR LAMINECTOMY DISKECTOMY- L4-5, MEDIAL FACETECTOMY;  Surgeon: Edmond Fung M.D.;  Location: SUR[facility]ERY [facility];  Service: Neurosurgery   • FORAMINOTOMY Bilateral 3/14/2019    Procedure: FORAMINOTOMY;  Surgeon: Edmond Fung M.D.;  Location: SURGERY [facility];  Service: Neurosurgery   • OTHER      T&A   • TONSILLECTOMY         Family History   Problem Relation Age of Onset   • Cancer Father         Esophogial/smoker   • No Known Problems Mother    • No Known Problems Brother    • No Known Problems Son    • No Known Problems Daughter    • No Known Problems Daughter    • No Known Problems Maternal Grandmother    • No Known Problems Maternal Grandfather    • No Known Problems Paternal Grandmother    • No Known Problems Paternal Grandfather    • Sleep Apnea Neg Hx        Social History     Socioeconomic History   • Marital status:      Spouse name: Not on file   • Number of children: Not on file   • Years of education: Not on file   • Highest education level: Not on file   Occupational  History   • Not on file   Social Needs   • Financial resource strain: Not on file   • Food insecurity     Worry: Not on file     Inability: Not on file   • Transportation needs     Medical: Not on file     Non-medical: Not on file   Tobacco Use   • Smoking status: Former Smoker     Packs/day: 1.00     Years: 39.00     Pack years: 39.00     Types: Cigarettes     Quit date: 2000     Years since quittin.2   • Smokeless tobacco: Never Used   • Tobacco comment: started smoking at age 16   Substance and Sexual Activity   • Alcohol use: No   • Drug use: No   • Sexual activity: Yes     Partners: Female   Lifestyle   • Physical activity     Days per week: Not on file     Minutes per session: Not on file   • Stress: Not on file   Relationships   • Social connections     Talks on phone: Not on file     Gets together: Not on file     Attends Hoahaoism service: Not on file     Active member of club or organization: Not on file     Attends meetings of clubs or organizations: Not on file     Relationship status: Not on file   • Intimate partner violence     Fear of current or ex partner: Not on file     Emotionally abused: Not on file     Physically abused: Not on file     Forced sexual activity: Not on file   Other Topics Concern   • Not on file   Social History Narrative   • Not on file       Current Outpatient Medications   Medication Sig Dispense Refill   • zolpidem (AMBIEN) 5 MG Tab Take 1 Tab by mouth at bedtime as needed for Sleep (1 to 2 po qhs prn insomnia/sleep study. Bring to sleep study.) for up to 2 doses. 2 Tab 0   • triamcinolone acetonide (KENALOG) 0.1 % Ointment APPLY TO AFFECTED AREA TWICE A DAY 15 g 0   • albuterol 108 (90 Base) MCG/ACT Aero Soln inhalation aerosol INHALE 2 PUFFS BY MOUTH EVERY 6 HOURS AS NEEDED 1 Each 11   • ASPIRIN 81 PO Take  by mouth.     • tiotropium (SPIRIVA) 18 MCG Cap Inhale 1 Cap by mouth every day. 90 Cap 2   • atorvastatin (LIPITOR) 40 MG Tab Take 1 Tab by mouth every day.  "90 Tab 3   • oxyCODONE-acetaminophen (PERCOCET-10)  MG Tab Take 1 Tab by mouth every four hours as needed for Severe Pain.     • tizanidine (ZANAFLEX) 4 MG Tab Take 1 Tab by mouth every 6 hours as needed. 30 Tab 0   • gabapentin (NEURONTIN) 300 MG Cap Take 900 mg by mouth 3 times a day.     • Polyethylene Glycol 3350 (MIRALAX PO) Take  by mouth.     • cloNIDine (CATAPRES) 0.1 MG Tab Take 0.1 mg by mouth 2 times a day.     • Misc. Devices Misc Overnight pulse oximetry to check for apnea episodes and low oxygen. 1 Each 11   • fluticasone-salmeterol (ADVAIR) 100-50 MCG/DOSE AEROSOL POWDER, BREATH ACTIVATED Inhale 1 Puff by mouth every 12 hours. (Patient not taking: Reported on 1/19/2021) 1 Inhaler 11     No current facility-administered medications for this visit.     \"CURRENT RX\"    ALLERGIES: Patient has no known allergies.    ROS    Constitutional: Denies fever, chills, sweats,  weight loss, fatigue.  Eyes: Denies vision loss, pain, drainage, double vision, glasses.  Ears/Nose/Mouth/Throat: Denies earache, difficulty hearing, rhinitis/nasal congestion, injury, recurrent sore throat, persistent hoarseness, decayed teeth/toothaches, ringing or buzzing in the ears.  Cardiovascular: Denies chest pain, tightness, palpitations, swelling in legs/feet, fainting, difficulty breathing when lying down but gets better when sitting up.   Respiratory: Denies shortness of breath, cough, sputum, wheezing, painful breathing, coughing up blood.   Sleep: per HPI  Gastrointestinal: Denies  difficulty swallowing, nausea, abdominal pain, diarrhea, constipation, heartburn.  Genitourinary: Denies  blood in urine, discharge, frequent urination.   Musculoskeletal: Denies painful joints, sore muscles, back pain.   Integumentary: Denies rashes, lumps, color changes.   Neurological: Denies frequent headaches,weakness, dizziness.    PHYSICAL EXAM  Slightly overweight.    /82 (BP Location: Left arm, Patient Position: Sitting, BP Cuff " "Size: Adult)   Pulse 85   Resp 16   Ht 1.778 m (5' 10\")   Wt 86.2 kg (190 lb)   SpO2 94%   BMI 27.26 kg/m²   Appearance: Well-nourished, well-developed, no acute distress  Eyes:  PERRLA, EOMI; glasses  ENMT: masked  Neck: Supple, trachea midline, no masses  Respiratory effort:  No intercostal retractions or use of accessory muscles  Lung auscultation:  No wheezes rhonchi rubs or rales  Cardiac: No murmurs, rubs, or gallops; regular rhythm, normal rate; trace edema  Abdomen:  No tenderness, no organomegaly.  Slightly overweight musculoskeletal:  Grossly normal; gait and station normal; digits and nails normal  Skin:  No rashes, petechiae, cyanosis  Neurologic: without focal signs; oriented to person, time, place, and purpose; judgement intact  Psychiatric:  No depression, anxiety, agitation        Medical Decision Making:  The medical record was reviewed in its entirety including the referral notes, records from primary care, consultants notes, hospital records, labs, imaging, microbiology, immunology, and immunizations.  Care gaps identified and reviewed with the patient.    Diagnostic and titration nocturnal polysomnograms, home sleep apnea tests, continuous nocturnal oximetry results, multiple sleep latency tests, and compliance reports reviewed.        1. BRANDEE (obstructive sleep apnea)  - zolpidem (AMBIEN) 5 MG Tab; Take 1 Tab by mouth at bedtime as needed for Sleep (1 to 2 po qhs prn insomnia/sleep study. Bring to sleep study.) for up to 2 doses.  Dispense: 2 Tab; Refill: 0  - Polysomnography, 4 or More; Future    2. COPD mixed type (HCC)    3. Chronic bilateral low back pain with left-sided sciatica    4. Primary insomnia    5. Post herpetic neuralgia    6. Former smoker    7. Up to date with influenza vaccination      PLAN:   The patient has signs and symptoms consistent with obstructive sleep apnea hypopnea syndrome. Will schedule a nocturnal polysomnogram or a home sleep apnea test depending on signs " and symptoms as well as insurance restrictions.    The risks of untreated sleep apnea were discussed with the patient at length. Patients with BRANDEE are at increased risk of cardiovascular disease including coronary artery disease, systemic arterial hypertension, pulmonary arterial hypertension, cardiac arrythmias, and stroke. BRANDEE patients have an increased risk of motor vehicle accidents, type 2 diabetes, chronic kidney disease, and non-alcoholic liver disease. The patient was advised to avoid driving a motor vehicle when drowsy.    Positive airway pressure will favorably impact many of the adverse conditions and effects provoked by BRANDEE.    Have advised the patient to follow up with the appropriate healthcare practitioners for all other medical problems and issues.    Mask wearing, handwashing, and social distancing protocols reviewed and encouraged.      Return for after sleep study.    We did a multi ox today and he dropped from the 90s down to 86 with exertion. Therefore, O2 2 L a minute as needed for exercise and he can use 2 L a minute pending his sleep study.

## 2021-01-26 ENCOUNTER — SLEEP STUDY (OUTPATIENT)
Dept: SLEEP MEDICINE | Facility: MEDICAL CENTER | Age: 76
End: 2021-01-26
Attending: INTERNAL MEDICINE
Payer: MEDICARE

## 2021-01-26 DIAGNOSIS — G47.33 OSA (OBSTRUCTIVE SLEEP APNEA): ICD-10-CM

## 2021-01-28 ENCOUNTER — OFFICE VISIT (OUTPATIENT)
Dept: SLEEP MEDICINE | Facility: MEDICAL CENTER | Age: 76
End: 2021-01-28
Payer: MEDICARE

## 2021-01-28 VITALS
OXYGEN SATURATION: 91 % | RESPIRATION RATE: 14 BRPM | WEIGHT: 194 LBS | HEIGHT: 70 IN | BODY MASS INDEX: 27.77 KG/M2 | HEART RATE: 92 BPM | SYSTOLIC BLOOD PRESSURE: 128 MMHG | DIASTOLIC BLOOD PRESSURE: 72 MMHG

## 2021-01-28 DIAGNOSIS — I10 HYPERTENSION, UNSPECIFIED TYPE: ICD-10-CM

## 2021-01-28 DIAGNOSIS — J44.9 COPD MIXED TYPE (HCC): ICD-10-CM

## 2021-01-28 DIAGNOSIS — F51.01 PRIMARY INSOMNIA: ICD-10-CM

## 2021-01-28 DIAGNOSIS — Z79.891 CHRONIC PRESCRIPTION OPIATE USE: ICD-10-CM

## 2021-01-28 DIAGNOSIS — G89.29 CHRONIC BILATERAL LOW BACK PAIN WITH LEFT-SIDED SCIATICA: ICD-10-CM

## 2021-01-28 DIAGNOSIS — G47.31 COMPLEX SLEEP APNEA SYNDROME: ICD-10-CM

## 2021-01-28 DIAGNOSIS — M54.42 CHRONIC BILATERAL LOW BACK PAIN WITH LEFT-SIDED SCIATICA: ICD-10-CM

## 2021-01-28 PROCEDURE — 99213 OFFICE O/P EST LOW 20 MIN: CPT | Performed by: NURSE PRACTITIONER

## 2021-01-28 PROCEDURE — 95811 POLYSOM 6/>YRS CPAP 4/> PARM: CPT | Performed by: FAMILY MEDICINE

## 2021-01-28 ASSESSMENT — FIBROSIS 4 INDEX: FIB4 SCORE: 1.91

## 2021-01-28 NOTE — PROGRESS NOTES
Chief Complaint   Patient presents with   • Follow-Up     BRANDEE-Last seen 01/19/2021   • Results     SS 01/26/2021       HPI:      Mr. Parikh is a 76 y/o male patient who is in today for BRANDEE f/u and sleep study results. PMH includes post herpatic neuralgia, shingles, HTN, psychiatric problems, eczema, ED, COPD mixed type, former smoker 1 pack/day for 39 years quit 11/1/2000, hypoxia, arthritis, tonsillectomy, snoring, daytime sleepiness, insomnia.     Multiox from 1/19/21 indicated he dropped from the 90s down to 86 with exertion. Therefore, O2 2 L a minute as needed for exercise was prescribed.     PSG split night study from 1/26/21 indicated severe complex sleep apnea with an AHI of 49.3.  Central apnea was evident at 24% during the diagnostic portion and 36% during the treatment portion of the study.  Patient spent 109.1 minutes below 89% O2.  CPAP was started at a pressure of 4 cm H2O and titrated as high as 14 cm H2O. At 13 cmH2O the AHI was 0.0, min SpO2 was 89%, max was 93%.     He goes to bed between 1-2 am and wakes up between 8-9 am. He denies morning headache, but reports snoring. He sleeps in a recliner and feels like he is being suffocated at night. He cannot lie down as sats fall into the 60s. Wife noted that he appeared gray after sleeping on his back on the couch. He was ordered supplemental oxygen at 2L with exertion after performing a multiox test at last office visit. He follows up with Dr. Saunders for chronic low back pain for which he takes oxycodone 10/325 mg qid prn and zanaflex 4 mg qhs prn. He also takes gabapentin 300 mg 3 tabs tid for post herpetic neuralgia. His COPD is managed by his PCP with whom he would like to continue his COPD care. He uses advair and albuterol, but reports he does get short of breath with exertion and prolonged walking distances. He denies SOB at rest. He request a handicap sticker today because he finds it difficult to walk from his car to the store due to shortness of  breath. He did not bring his portable oxygen tank today, but has it at home. He denies any new health problem or medications.     ROS:    Constitutional: Denies fevers, Denies weight changes  Eyes: Denies changes in vision, no eye pain  Ears/Nose/Throat/Mouth: Denies nasal congestion or sore throat   Cardiovascular: Denies chest pain or palpitations   Respiratory: + shortness of breath with exertion, Denies cough  Gastrointestinal/Hepatic: Denies abdominal pain, nausea, vomiting,   Skin/Breast: Denies rash,   Neurological: Denies headache, confusion,   Psychiatric: denies mood disorder   Sleep: + snoring       Past Medical History:   Diagnosis Date   • Anesthesia    • Apnea, sleep    • Arthritis    • Asthma     daily inhaler   • Chickenpox    • Cold 01/2019   • Daytime sleepiness    • Dental disorder     full dentures   • Emphysema of lung (HCC)    • Gasping for breath    • Persian measles    • Heart burn    • Hypertension    • Pain     back   • Pneumonia 2018   • Psychiatric problem     anxiety   • Shingles 03/01/2018   • Shingles 05/01/2018   • Snoring        Past Surgical History:   Procedure Laterality Date   • LUMBAR FUSION O-ARM  10/9/2019    Procedure: FUSION, SPINE, LUMBAR, WITH O-ARM IMAGING GUIDANCE- L4-5 TLIF;  Surgeon: Nikolas Dooley M.D.;  Location: [facility];  Service: Neurosurgery   • LUMBAR LAMINECTOMY DISKECTOMY  10/9/2019    Procedure: LAMINECTOMY, SPINE, LUMBAR, WITH DISCECTOMY- REDO L3-S1 LAMI;  Surgeon: Nikolas Dooley M.D.;  Location: [facility];  Service: Neurosurgery   • LUMBAR DECOMPRESSION  10/9/2019    Procedure: DECOMPRESSION, SPINE, LUMBAR- RIGHT L5 TRANSPEDICULAR;  Surgeon: Nikolas Dooley M.D.;  Location: [facility];  Service: Neurosurgery   • LUMBAR LAMINECTOMY DISKECTOMY  3/14/2019    Procedure: LUMBAR LAMINECTOMY DISKECTOMY- L4-5, MEDIAL FACETECTOMY;  Surgeon: Edmond Fung M.D.;  Location: [facility];  Service: Neurosurgery   •  FORAMINOTOMY Bilateral 3/14/2019    Procedure: FORAMINOTOMY;  Surgeon: Edmond Fung M.D.;  Location: SURGERY [facility];  Service: Neurosurgery   • OTHER      T&A   • TONSILLECTOMY         Family History   Problem Relation Age of Onset   • Cancer Father         Esophogial/smoker   • No Known Problems Mother    • No Known Problems Brother    • No Known Problems Son    • No Known Problems Daughter    • No Known Problems Daughter    • No Known Problems Maternal Grandmother    • No Known Problems Maternal Grandfather    • No Known Problems Paternal Grandmother    • No Known Problems Paternal Grandfather    • Sleep Apnea Neg Hx        Social History     Socioeconomic History   • Marital status:      Spouse name: Not on file   • Number of children: Not on file   • Years of education: Not on file   • Highest education level: Not on file   Occupational History   • Not on file   Social Needs   • Financial resource strain: Not on file   • Food insecurity     Worry: Not on file     Inability: Not on file   • Transportation needs     Medical: Not on file     Non-medical: Not on file   Tobacco Use   • Smoking status: Former Smoker     Packs/day: 1.00     Years: 39.00     Pack years: 39.00     Types: Cigarettes     Quit date: 2000     Years since quittin.2   • Smokeless tobacco: Never Used   • Tobacco comment: started smoking at age 16   Substance and Sexual Activity   • Alcohol use: No   • Drug use: No   • Sexual activity: Yes     Partners: Female   Lifestyle   • Physical activity     Days per week: Not on file     Minutes per session: Not on file   • Stress: Not on file   Relationships   • Social connections     Talks on phone: Not on file     Gets together: Not on file     Attends Rastafarian service: Not on file     Active member of club or organization: Not on file     Attends meetings of clubs or organizations: Not on file     Relationship status: Not on file   • Intimate partner violence     Fear  of current or ex partner: Not on file     Emotionally abused: Not on file     Physically abused: Not on file     Forced sexual activity: Not on file   Other Topics Concern   • Not on file   Social History Narrative   • Not on file       Allergies as of 01/28/2021   • (No Known Allergies)        Vitals:  Vitals:    01/28/21 1048   BP: 128/72   Pulse: 92   Resp: 14   SpO2: 91%       Current medications as of today   Current Outpatient Medications   Medication Sig Dispense Refill   • triamcinolone acetonide (KENALOG) 0.1 % Ointment APPLY TO AFFECTED AREA TWICE A DAY 15 g 0   • albuterol 108 (90 Base) MCG/ACT Aero Soln inhalation aerosol INHALE 2 PUFFS BY MOUTH EVERY 6 HOURS AS NEEDED 1 Each 11   • ASPIRIN 81 PO Take  by mouth.     • tiotropium (SPIRIVA) 18 MCG Cap Inhale 1 Cap by mouth every day. 90 Cap 2   • atorvastatin (LIPITOR) 40 MG Tab Take 1 Tab by mouth every day. 90 Tab 3   • oxyCODONE-acetaminophen (PERCOCET-10)  MG Tab Take 1 Tab by mouth every four hours as needed for Severe Pain.     • fluticasone-salmeterol (ADVAIR) 100-50 MCG/DOSE AEROSOL POWDER, BREATH ACTIVATED Inhale 1 Puff by mouth every 12 hours. 1 Inhaler 11   • tizanidine (ZANAFLEX) 4 MG Tab Take 1 Tab by mouth every 6 hours as needed. 30 Tab 0   • gabapentin (NEURONTIN) 300 MG Cap Take 900 mg by mouth 3 times a day.     • Polyethylene Glycol 3350 (MIRALAX PO) Take  by mouth.     • cloNIDine (CATAPRES) 0.1 MG Tab Take 0.1 mg by mouth 2 times a day.     • zolpidem (AMBIEN) 5 MG Tab Take 1 Tab by mouth at bedtime as needed for Sleep (1 to 2 po qhs prn insomnia/sleep study. Bring to sleep study.) for up to 2 doses. (Patient not taking: Reported on 1/28/2021) 2 Tab 0   • Misc. Devices Misc Overnight pulse oximetry to check for apnea episodes and low oxygen. 1 Each 11     No current facility-administered medications for this visit.          Physical Exam: Limited by COVID-19 precautions.  Appearance: Well developed, well nourished, no acute  distress  Eyes: PERRL, EOM intact, sclera white, conjunctiva moist  Ears: no lesions or deformities  Hearing: grossly intact  Nose: no lesions or deformities  Respiratory effort: no intercostal retractions or use of accessory muscles  Extremities: no cyanosis or edema  Abdomen: soft   Gait and Station: normal  Digits and nails: no clubbing, cyanosis, petechiae or nodes.  Cranial nerves: grossly intact  Skin: no visible rashes, lesions or ulcers noted  Orientation: Oriented to time, person and place  Mood and affect: mood and affect appropriate, normal interaction with examiner  Judgement: Intact    Assessment:  1. Complex sleep apnea syndrome  DME CPAP   2. Primary insomnia     3. Hypertension, unspecified type     4. COPD mixed type (HCC)     5. Chronic bilateral low back pain with left-sided sciatica     6. Chronic prescription opiate use           Plan  Discussed the cardiovascular and neuropsychiatric risks of untreated BRANDEE; including but not limited to: HTN, DM, MI, ASCVD, CVA, CHF, traffic accidents.     1.PSG split night study from 1/26/21 indicated severe complex sleep apnea with an AHI of 49.3.  Central apnea was evident at 24% during the diagnostic portion and 36% during the treatment portion of the study.  Patient spent 109.1 minutes below 89% O2.  CPAP was started at a pressure of 4 cm H2O and titrated as high as 14 cm H2O. At 13 cmH2O the AHI was 0.0, min SpO2 was 89%, max was 93%.   Recommendation:  After review with Dr. Goode it is recommended to place the patient on CPAP 13 cmH2O with 2L O2 bleed in.     2. DME order ([facility]) for CPAP 13 cmH2O, mask (large Airfit F20 mask or MOC) and supplies was provided today. Clean mask and supplies weekly with soap and water, and change supplies per insurance guidelines.     3. Order OPO on CPAP 13 cmH2O and 2L O2 bleed in at next office visit if AHI is normal to r/o nocturnal hypoxia.   4. Continue to f/u with Dr. Saunders, pain specialist for chronic back pain.  Currently on oxcydone 10/325 mg qid prn and zanaflex 4 mg qhs prn. He also takes gabapentin 300 mg 3 tabs tid for post herpetic neuralgia.   5. Follow up with the appropriate healthcare practitioners for all other medical problems and issues.  6. Sleep hygiene discussed. Recommend keeping a set sleep/wake schedule. Logging enough hours of sleep. Limiting/Avoiding naps. No caffeine after noon and no heavy meals in the evening.   7. Continue to f/u with PCP for BP management and take BP medication as directed. Continue to f/u with PCP for COPD and advised patient to have handicap card doc (which were provided to the patient) filled out by PCP as they are managing his COPD. Patient would prefer that PCP continue managing COPD. Continue using 2L O2 with exertion and at night.    8. F/u in 3 months.       PIAT Nance.      This dictation was created using voice recognition software. The accuracy of the dictation is limited to the abilities of the software. I expect there may be some errors of grammar and possibly content.

## 2021-01-28 NOTE — PROCEDURES
Technical summary: The patient underwent a split-night polysomnogram. This was a 16 channel montage study to include a 6 channel EEG, a 2 channel EOG, and chin EMG, left and right leg EMG, a snore channel, a nasal pressure transducer, and a nasal oral airflow  thermistor and a CFLOW pressure transducer.   Respiratory effort was assessed with the use of a thoracic and abdominal monitor and overnight oximetry was obtained. Audio and video recordings were reviewed. This was a fully attended study and sleep stage scoring was performed. The test was technically adequate.    Scoring Criteria: A modification of the the AASM Manual for the Scoring of Sleep and Associated Events, 2012, was used.   Obstructive apnea was scored by cessation of airflow for at least 10 seconds with continuing respiratory effort.  Central apnea was scored by cessation of airflow for at least 10 seconds with no effort.  Hypopnea was scored by a 30% or more reduction in airflow for at least 10 seconds accompanied by an arterial oxygen desaturation of 3% or more.  (For Medicare patients, hypopneas were scored by a 30% or more reduction in airflow for at least 10 seconds accompanied by an arterial oxygen saturation of 4% or more, as required by their insurance, CMS.    Interpretation:  Study start time was 10:35:43 PM. Total recording time was 3h 29.0m (209 minutes) with a total sleep time of 2h 32.0m (152 minutes) resulting in a sleep efficiency of 72.73%.  Sleep latency from the start fo the study was 05 minutes minutes and REM latency from sleep onset was 42 minutes minutes.    Respiratory:   The study was started with O2 at 4L/min  Which was turned off. There were 93 apneas in total consisting of 62 obstructive apneas, 0 mixed apneas, and 31 central apneas. There were 32 hypopneas in total.The apnea index was 36.71 per hour and the hypopnea index was 12.63 per hour.The overall AHI was 49.3, with a REM AHI of 35.29, and a supine AHI of 49.34. 24%  of the apneas were central in nature.    Limb Movements:  There were a total of 11 periodic leg movements, of which 0 were PLMS arousals. This resulted in a PLMS index of 4.3 and a PLMS arousal index of 0.0    Oximetry:  The mean SaO2 was 85.0% for the diagnostic portion of the study, with a minimum SaO2 of 58.0%.   Treatment:  Interpretation:  Treatment recording time was 3h 40.5m (220 minutes) with a total sleep time of 3h 13.5m (193 minutes) resulting in a sleep efficiency of 87.8%.   Sleep latency from the start of treatment was 05 minutes minutes and REM latency from sleep onset was 0h 56.0m minutes.   The patient had 62 arousals in total for an arousal index of 19.2.    Respiratory:   There were 46 apneas in total consisting of 18 obstructive apneas, 28 central apneas, and 0 mixed apneas for an apnea index of 14.26.   The patient had 31 hypopneas in total, which resulted in a hypopnea index of 9.61. The overall AHI was 23.88, with a REM AHI of 21.08, and a supine AHI of 23.44. 36% of the apneas were central in nature.    Limb Movements:  There were a total of 0 periodic leg movements, of which 0 were PLMS arousals. This resulted in a PLMS index of 0.0 and a PLMS arousal index of 0.0.    Oximetry:  The mean SaO2 during treatment was 88.0%, with a minimum oxygen saturation of 61.0%.    CPAP was tried from 4 to 14cm H2O.Supplemental O2 during diagnostic starting at portion 4L and reduced to 0L    CPAP Titration:  Due to the significant number of obstructive respiratory events observed during the diagnostic portion of the study a CPAP titration trial was performed during the second half of the night. The CPAP pressure was initiated at 5 cm of water and the pressure was increased in an attempt to eliminate all sleep disordered breathing and snoring. The CPAP pressure was increased to 13 cm water and at this final pressure the patient was observed in non supine REM sleep stage. The apnea hypopnea index improved to  0/hr with improved O2 derrick of  89%. He spent 83 min of sleep time below 89% O2 saturation Snoring was resolved.  The patient utilized large Airfit F20 mask with heated humidification. The PAP was well-tolerated and there was minimal air leaks. No supplemental oxygen was required.    Impression:  1.  Severe complex sleep apnea with AHI of 49.3/hr and O2 derrick 58 %. Due to severity of the disease he met the split study protocol. The titration started with CPAP 4 cm and the best tolerated was CPAP 13 cm. The AHI improved to 0/hr with improved O2 derrick of 89% and average O2 saturation of 91 %.     2.  Sleep related hypoxia      Recommendations:I recommend ACPAP 13 cm with Airfit F20 mask. Consider supplemental O2 bleed in and f/u with OPO on the recommended pressure due to residual sleep hypoxia. I also recommend 30 day compliance download to assess the efficacy to the recommended pressure, measure leak, apnea hypopnea index and compliance for further outpatient monitoring and management of CPAP therapy. In some cases alternative treatment options may prove effective in resolving sleep apnea and these options include upper airway surgery, the use of a dental orthotic or weight loss and positional therapy. Clinical correlation is required. In general patients with sleep apnea are advised to avoid alcohol and sedatives and to not operate a motor vehicle while drowsy and are at a greater risk for cardiovascular disease.

## 2021-02-06 ENCOUNTER — IMMUNIZATION (OUTPATIENT)
Dept: FAMILY PLANNING/WOMEN'S HEALTH CLINIC | Facility: IMMUNIZATION CENTER | Age: 76
End: 2021-02-06
Attending: INTERNAL MEDICINE
Payer: MEDICARE

## 2021-02-06 DIAGNOSIS — Z23 ENCOUNTER FOR VACCINATION: Primary | ICD-10-CM

## 2021-02-06 DIAGNOSIS — Z23 NEED FOR VACCINATION: ICD-10-CM

## 2021-02-06 PROCEDURE — 91300 PFIZER SARS-COV-2 VACCINE: CPT

## 2021-02-06 PROCEDURE — 0001A PFIZER SARS-COV-2 VACCINE: CPT

## 2021-02-27 ENCOUNTER — IMMUNIZATION (OUTPATIENT)
Dept: FAMILY PLANNING/WOMEN'S HEALTH CLINIC | Facility: IMMUNIZATION CENTER | Age: 76
End: 2021-02-27
Payer: MEDICARE

## 2021-02-27 DIAGNOSIS — Z23 ENCOUNTER FOR VACCINATION: Primary | ICD-10-CM

## 2021-02-27 PROCEDURE — 0002A PFIZER SARS-COV-2 VACCINE: CPT

## 2021-02-27 PROCEDURE — 91300 PFIZER SARS-COV-2 VACCINE: CPT

## 2021-03-31 ENCOUNTER — APPOINTMENT (OUTPATIENT)
Dept: SLEEP MEDICINE | Facility: MEDICAL CENTER | Age: 76
End: 2021-03-31
Payer: MEDICARE

## 2021-03-31 ENCOUNTER — TELEPHONE (OUTPATIENT)
Dept: SLEEP MEDICINE | Facility: MEDICAL CENTER | Age: 76
End: 2021-03-31

## 2021-03-31 DIAGNOSIS — J44.9 COPD MIXED TYPE (HCC): ICD-10-CM

## 2021-03-31 NOTE — TELEPHONE ENCOUNTER
Please sign for pt to get set up on pulse dose tanks since the original order did not include this

## 2021-04-06 ENCOUNTER — PATIENT MESSAGE (OUTPATIENT)
Dept: SLEEP MEDICINE | Facility: MEDICAL CENTER | Age: 76
End: 2021-04-06

## 2021-04-06 DIAGNOSIS — J44.9 COPD MIXED TYPE (HCC): ICD-10-CM

## 2021-04-12 ENCOUNTER — PATIENT MESSAGE (OUTPATIENT)
Dept: SLEEP MEDICINE | Facility: MEDICAL CENTER | Age: 76
End: 2021-04-12

## 2021-04-12 DIAGNOSIS — G47.31 COMPLEX SLEEP APNEA SYNDROME: ICD-10-CM

## 2021-04-13 NOTE — PROGRESS NOTES
Order is placed to change CPAP 13 cmH2O to autoCPAP 5-15 cmH2O due to pressure intolerance. Please make changes if able otherwise fax order. Please inform the patient and also advised him that he does have a significant mask leak which may be related to him taking the mask off or he may want to consider switching to a different type.  He has an appointment scheduled for May.    Thank you  PITA Nance.

## 2021-04-13 NOTE — PATIENT COMMUNICATION
DAVID Nance 19 minutes ago (9:50 AM)        Order is placed to change CPAP 13 cmH2O to autoCPAP 5-15 cmH2O due to pressure intolerance. Please make changes if able otherwise fax order. Please inform the patient and also advised him that he does have a significant mask leak which may be related to him taking the mask off or he may want to consider switching to a different type.  He has an appointment scheduled for May.     Thank you  DAVID Nance        Called and informed patient that order was sent to [facility] and informed of above message.       Faxed OV, Orders, ID/Insurance to DME:  [facility] / [facility]orenzo [phone] / christiano [phone].  Fax Confirmed 04/13/21  PROMISE

## 2021-04-13 NOTE — PATIENT COMMUNICATION
PITA Nance.  You 15 minutes ago (8:56 AM)     Can you get me a compliance on this patient please.     Thank you   DAVID Nance      Scanned in media

## 2021-05-05 ENCOUNTER — OFFICE VISIT (OUTPATIENT)
Dept: SLEEP MEDICINE | Facility: MEDICAL CENTER | Age: 76
End: 2021-05-05
Payer: MEDICARE

## 2021-05-05 VITALS
DIASTOLIC BLOOD PRESSURE: 64 MMHG | HEIGHT: 70 IN | SYSTOLIC BLOOD PRESSURE: 128 MMHG | WEIGHT: 199 LBS | HEART RATE: 71 BPM | OXYGEN SATURATION: 95 % | BODY MASS INDEX: 28.49 KG/M2

## 2021-05-05 DIAGNOSIS — G47.31 COMPLEX SLEEP APNEA SYNDROME: ICD-10-CM

## 2021-05-05 DIAGNOSIS — I10 HYPERTENSION, UNSPECIFIED TYPE: ICD-10-CM

## 2021-05-05 DIAGNOSIS — F51.01 PRIMARY INSOMNIA: ICD-10-CM

## 2021-05-05 PROCEDURE — 99213 OFFICE O/P EST LOW 20 MIN: CPT | Performed by: NURSE PRACTITIONER

## 2021-05-05 ASSESSMENT — FIBROSIS 4 INDEX: FIB4 SCORE: 1.94

## 2021-05-05 NOTE — PROGRESS NOTES
Chief Complaint   Patient presents with   • Follow-Up     Complex sleep apnea- Last seen 01/28/2021       HPI:      Mr. Parikh is a 75 y/o male patient who is in today for CSA f/u. PMH includes post herpatic neuralgia, shingles, HTN, psychiatric problems, eczema, ED, COPD mixed type, former smoker 1 pack/day for 39 years quit 11/1/2000, hypoxia, arthritis, tonsillectomy, snoring, daytime sleepiness, insomnia.     First compliance report from 4/14/21-5/4/21 was downloaded and reviewed with the patient which showed CPAP 13 cmH2O with 2L O2 bleed in, 100% compliance, 6 hrs 57 min use, AHI of 9.7. He is tolerating the pressure well after it was changed to a auto setting and he also had to switch from the large AirFit F20 mask to a different large full facemask which he does not recall the name. He goes to bed between 1-2 am and wakes up between 8-9 am.  He states that he does not take any sleep aids however the tizanidine allows him to relax enough to allow him to fall asleep.  He denies morning headache or snoring. He follows up with Dr. Saunders for chronic low back pain for which he takes oxycodone 10/325 mg qid prn and zanaflex 4 mg qhs prn. He also takes gabapentin 300 mg 3 tabs tid for post herpetic neuralgia. His COPD is managed by his PCP with whom he would like to continue his COPD care. He uses advair and albuterol. He denies SOB at rest, but is now using oxygen at 2 L 24/7 which has also helped decrease the shortness of breath with exertion. He denies any new health problems or medications.      Sleep Study History:   Multiox from 1/19/21 indicated he dropped from the 90s down to 86 with exertion. Therefore, O2 2 L a minute as needed for exercise was prescribed.     PSG split night study from 1/26/21 indicated severe complex sleep apnea with an AHI of 49.3.  Central apnea was evident at 24% during the diagnostic portion and 36% during the treatment portion of the study. Patient spent 109.1 minutes below 89% O2.   CPAP was started at a pressure of 4 cm H2O and titrated as high as 14 cm H2O. At 13 cmH2O the AHI was 0.0, min SpO2 was 89%, max was 93%.     ROS:    Constitutional: Denies fevers, Denies weight changes  Eyes: Denies changes in vision, no eye pain  Ears/Nose/Throat/Mouth: Denies nasal congestion or sore throat   Cardiovascular: Denies chest pain or palpitations   Respiratory: Denies shortness of breath , Denies cough  Gastrointestinal/Hepatic: Denies abdominal pain, nausea, vomiting,   Skin/Breast: Denies rash,   Neurological: Denies headache, confusion,   Psychiatric: denies mood disorder   Sleep: denies snoring       Past Medical History:   Diagnosis Date   • Anesthesia    • Apnea, sleep    • Arthritis    • Asthma     daily inhaler   • Chickenpox    • Cold 01/2019   • Daytime sleepiness    • Dental disorder     full dentures   • Emphysema of lung (HCC)    • Gasping for breath    • Japanese measles    • Heart burn    • Hypertension    • Pain     back   • Pneumonia 2018   • Psychiatric problem     anxiety   • Shingles 03/01/2018   • Shingles 05/01/2018   • Snoring        Past Surgical History:   Procedure Laterality Date   • LUMBAR FUSION O-ARM  10/9/2019    Procedure: FUSION, SPINE, LUMBAR, WITH O-ARM IMAGING GUIDANCE- L4-5 TLIF;  Surgeon: Nikolas Dooley M.D.;  Location: [facility];  Service: Neurosurgery   • LUMBAR LAMINECTOMY DISKECTOMY  10/9/2019    Procedure: LAMINECTOMY, SPINE, LUMBAR, WITH DISCECTOMY- REDO L3-S1 LAMI;  Surgeon: Nikolas Dooley M.D.;  Location: [facility];  Service: Neurosurgery   • LUMBAR DECOMPRESSION  10/9/2019    Procedure: DECOMPRESSION, SPINE, LUMBAR- RIGHT L5 TRANSPEDICULAR;  Surgeon: Nikolas Dooley M.D.;  Location: [facility];  Service: Neurosurgery   • LUMBAR LAMINECTOMY DISKECTOMY  3/14/2019    Procedure: LUMBAR LAMINECTOMY DISKECTOMY- L4-5, MEDIAL FACETECTOMY;  Surgeon: Edmond Fung M.D.;  Location: [facility];  Service:  Neurosurgery   • FORAMINOTOMY Bilateral 3/14/2019    Procedure: FORAMINOTOMY;  Surgeon: Edmond Fung M.D.;  [facility];  Service: Neurosurgery   • OTHER      T&A   • TONSILLECTOMY         Family History   Problem Relation Age of Onset   • Cancer Father         Esophogial/smoker   • No Known Problems Mother    • No Known Problems Brother    • No Known Problems Son    • No Known Problems Daughter    • No Known Problems Daughter    • No Known Problems Maternal Grandmother    • No Known Problems Maternal Grandfather    • No Known Problems Paternal Grandmother    • No Known Problems Paternal Grandfather    • Sleep Apnea Neg Hx        Social History     Socioeconomic History   • Marital status:      Spouse name: Not on file   • Number of children: Not on file   • Years of education: Not on file   • Highest education level: Not on file   Occupational History   • Not on file   Tobacco Use   • Smoking status: Former Smoker     Packs/day: 1.00     Years: 39.00     Pack years: 39.00     Types: Cigarettes     Quit date: 2000     Years since quittin.5   • Smokeless tobacco: Never Used   • Tobacco comment: started smoking at age 16   Substance and Sexual Activity   • Alcohol use: No   • Drug use: No   • Sexual activity: Yes     Partners: Female   Other Topics Concern   • Not on file   Social History Narrative   • Not on file     Social Determinants of Health     Financial Resource Strain:    • Difficulty of Paying Living Expenses:    Food Insecurity:    • Worried About Running Out of Food in the Last Year:    • Ran Out of Food in the Last Year:    Transportation Needs:    • Lack of Transportation (Medical):    • Lack of Transportation (Non-Medical):    Physical Activity:    • Days of Exercise per Week:    • Minutes of Exercise per Session:    Stress:    • Feeling of Stress :    Social Connections:    • Frequency of Communication with Friends and Family:    • Frequency of Social  Gatherings with Friends and Family:    • Attends [employer] Services:    • Active Member of Clubs or Organizations:    • Attends Club or Organization Meetings:    • Marital Status:    Intimate Partner Violence:    • Fear of Current or Ex-Partner:    • Emotionally Abused:    • Physically Abused:    • Sexually Abused:        Allergies as of 05/05/2021   • (No Known Allergies)        Vitals:  Vitals:    05/05/21 0902   BP: 128/64   Pulse: 71   SpO2: 95%       Current medications as of today   Current Outpatient Medications   Medication Sig Dispense Refill   • triamcinolone acetonide (KENALOG) 0.1 % Ointment APPLY TO AFFECTED AREA TWICE A DAY 15 g 0   • Misc. Devices Misc Overnight pulse oximetry to check for apnea episodes and low oxygen. 1 Each 11   • albuterol 108 (90 Base) MCG/ACT Aero Soln inhalation aerosol INHALE 2 PUFFS BY MOUTH EVERY 6 HOURS AS NEEDED 1 Each 11   • ASPIRIN 81 PO Take  by mouth.     • tiotropium (SPIRIVA) 18 MCG Cap Inhale 1 Cap by mouth every day. 90 Cap 2   • atorvastatin (LIPITOR) 40 MG Tab Take 1 Tab by mouth every day. 90 Tab 3   • oxyCODONE-acetaminophen (PERCOCET-10)  MG Tab Take 1 Tab by mouth every four hours as needed for Severe Pain.     • fluticasone-salmeterol (ADVAIR) 100-50 MCG/DOSE AEROSOL POWDER, BREATH ACTIVATED Inhale 1 Puff by mouth every 12 hours. 1 Inhaler 11   • tizanidine (ZANAFLEX) 4 MG Tab Take 1 Tab by mouth every 6 hours as needed. 30 Tab 0   • gabapentin (NEURONTIN) 300 MG Cap Take 900 mg by mouth 3 times a day.     • Polyethylene Glycol 3350 (MIRALAX PO) Take  by mouth.     • cloNIDine (CATAPRES) 0.1 MG Tab Take 0.1 mg by mouth 2 times a day.       No current facility-administered medications for this visit.         Physical Exam: Limited by COVID-19 precautions.  Appearance: Well developed, well nourished, no acute distress  Eyes: PERRL, EOM intact, sclera white, conjunctiva moist  Ears: no lesions or deformities  Hearing: grossly intact  Nose: no lesions or  deformities  Respiratory effort: no intercostal retractions or use of accessory muscles. Portable oxygen concentrator via NC is in place.   Extremities: no cyanosis or edema  Abdomen: soft   Gait and Station: normal  Digits and nails: no clubbing, cyanosis, petechiae or nodes.  Cranial nerves: grossly intact  Skin: no visible rashes, lesions or ulcers noted  Orientation: Oriented to time, person and place  Mood and affect: mood and affect appropriate, normal interaction with examiner  Judgement: Intact    Assessment:  1. Complex sleep apnea syndrome     2. Hypertension, unspecified type     3. Primary insomnia     4. BMI 28.0-28.9,adult           Plan  Discussed the cardiovascular and neuropsychiatric risks of untreated BRANDEE; including but not limited to: HTN, DM, MI, ASCVD, CVA, CHF, traffic accidents.     1. First compliance report from 4/14/21-5/4/21 was downloaded and reviewed with the patient which showed CPAP 13 cmH2O with 2L O2 bleed in, 100% compliance, 6 hrs 57 min use, AHI of 9.7, BRITTNI 0.2, OAI 5.3. Continue CPAP 13 cmH2O and 2L O2. Patient is compliant and benefiting from CPAP and 2L O2 bleed in therapy for management of BRANDEE. Advised patient to continue to use the CPAP every night for more than four hours for optimal health benefit and to meet the health insurance 70% compliance guideline.     *DME order ([facility]) for mask (large FFM or MOC) and supplies was not needed today. Continue to clean mask and supplies weekly with soap and water, and change supplies per insurance guidelines.     * DME order to increase autoCPAP to 10-16 cmH2O was provided today due to elevated AHI.  Ideally would like to increase the base pressure to 12 however will allow the patient to further acclimate to machine at this time.    * Order OPO on autoCPAP 10-16 cmH2O and 2L O2 bleed in at next office visit if AHI is controlled to r/o nocturnal hypoxia.    2. Continue to f/u with PCP for BP management. Continue to f/u with PCP for  COPD. Patient would prefer that PCP continue managing COPD. Continue using 2L O2 with exertion and at night.    3. Sleep hygiene discussed. Recommend keeping a set sleep/wake schedule. Logging enough hours of sleep. Limiting/Avoiding naps. No caffeine after noon and no heavy meals in the evening.   Chronic insomnia: Patient is not taking any sleep aids.  Tizanidine to help with chronic back pain allows the patient to sleep.  4. Continue to stay active.   5. Follow up with the appropriate healthcare practitioners for all other medical problems and issues. Continue to f/u with Dr. Saunders, pain specialist for chronic back pain. Currently on oxcydone 10/325 mg qid prn and zanaflex 4 mg qhs prn. He also takes gabapentin 300 mg 3 tabs tid for post herpetic neuralgia.   6. F/u in 3 months, sooner if needed.       PITA Nance.      This dictation was created using voice recognition software. The accuracy of the dictation is limited to the abilities of the software. I expect there may be some errors of grammar and possibly content.

## 2021-06-11 DIAGNOSIS — J44.9 COPD MIXED TYPE (HCC): ICD-10-CM

## 2021-08-02 ENCOUNTER — OFFICE VISIT (OUTPATIENT)
Dept: MEDICAL GROUP | Facility: MEDICAL CENTER | Age: 76
End: 2021-08-02
Payer: MEDICARE

## 2021-08-02 VITALS
OXYGEN SATURATION: 91 % | HEART RATE: 79 BPM | SYSTOLIC BLOOD PRESSURE: 122 MMHG | RESPIRATION RATE: 16 BRPM | WEIGHT: 192.2 LBS | DIASTOLIC BLOOD PRESSURE: 60 MMHG | TEMPERATURE: 97.7 F | BODY MASS INDEX: 27.58 KG/M2

## 2021-08-02 DIAGNOSIS — E78.5 DYSLIPIDEMIA: ICD-10-CM

## 2021-08-02 DIAGNOSIS — M79.89 FOOT SWELLING: ICD-10-CM

## 2021-08-02 PROCEDURE — 99213 OFFICE O/P EST LOW 20 MIN: CPT | Performed by: STUDENT IN AN ORGANIZED HEALTH CARE EDUCATION/TRAINING PROGRAM

## 2021-08-02 RX ORDER — ATORVASTATIN CALCIUM 40 MG/1
40 TABLET, FILM COATED ORAL DAILY
Qty: 90 TABLET | Refills: 3 | Status: SHIPPED | OUTPATIENT
Start: 2021-08-02 | End: 2021-08-30

## 2021-08-02 RX ORDER — ALBUTEROL SULFATE 90 UG/1
2 AEROSOL, METERED RESPIRATORY (INHALATION) EVERY 6 HOURS PRN
Qty: 1 EACH | Refills: 11 | Status: SHIPPED | OUTPATIENT
Start: 2021-08-02 | End: 2022-09-16

## 2021-08-02 RX ORDER — PREGABALIN 50 MG/1
CAPSULE ORAL
COMMUNITY
Start: 2021-07-20 | End: 2021-11-05

## 2021-08-02 ASSESSMENT — FIBROSIS 4 INDEX: FIB4 SCORE: 1.94

## 2021-08-02 ASSESSMENT — PATIENT HEALTH QUESTIONNAIRE - PHQ9: CLINICAL INTERPRETATION OF PHQ2 SCORE: 0

## 2021-08-02 NOTE — PROGRESS NOTES
Subjective:     CC: Foot swelling    HPI:   Bayron presents today with     Foot swelling  Patient presents with concerns about right foot swelling x4 months.  Patient denies any injury or trauma.  Patient thought it was due to his high doses of gabapentin so he stopped taking it but notes no improvement with this.  Patient denies any warmth or redness to the area.  Patient notes that the swelling is improved in the mornings.  Patient denies any unusual shortness of breath or chest pain.  Patient denies orthopnea.  Patient denies calf swelling or claudication.  Patient states occasional swelling in left foot but worse on right.  He notes swelling is improved in the morning and worse in the evenings.    Current Outpatient Medications Ordered in Epic   Medication Sig Dispense Refill   • albuterol 108 (90 Base) MCG/ACT Aero Soln inhalation aerosol Inhale 2 Puffs every 6 hours as needed. 1 Each 11   • atorvastatin (LIPITOR) 40 MG Tab Take 1 tablet by mouth every day. 90 tablet 3   • pregabalin (LYRICA) 50 MG capsule TAKE 1 CAPSULE BY MOUTH TWICE A DAY AS DIRECTED     • fluticasone-salmeterol (ADVAIR) 100-50 MCG/DOSE AEROSOL POWDER, BREATH ACTIVATED Inhale 1 Puff by mouth every 12 hours. 180 Each 0   • triamcinolone acetonide (KENALOG) 0.1 % Ointment APPLY TO AFFECTED AREA TWICE A DAY 15 g 0   • Misc. Devices Misc Overnight pulse oximetry to check for apnea episodes and low oxygen. 1 Each 11   • ASPIRIN 81 PO Take  by mouth.     • tiotropium (SPIRIVA) 18 MCG Cap Inhale 1 Cap by mouth every day. 90 Cap 2   • oxyCODONE-acetaminophen (PERCOCET-10)  MG Tab Take 1 Tab by mouth every four hours as needed for Severe Pain.     • tizanidine (ZANAFLEX) 4 MG Tab Take 1 Tab by mouth every 6 hours as needed. 30 Tab 0   • gabapentin (NEURONTIN) 300 MG Cap Take 900 mg by mouth 3 times a day.     • Polyethylene Glycol 3350 (MIRALAX PO) Take  by mouth.     • cloNIDine (CATAPRES) 0.1 MG Tab Take 0.1 mg by mouth 2 times a day.  (Patient not taking: Reported on 8/2/2021)       No current Epic-ordered facility-administered medications on file.       Health Maintenance: Completed    ROS:  Gen: no fevers/chills, no changes in weight  Eyes: no changes in vision  ENT: no sore throat, no hearing loss, no bloody nose  Pulm: no sob, no cough  CV: no chest pain, no palpitations  GI: no nausea/vomiting, no diarrhea  : no dysuria  MSk: no myalgias  Skin: no rash  Neuro: no headaches, no numbness/tingling  Heme/Lymph: no easy bruising      Objective:     Exam:  /60 (BP Location: Right arm, Patient Position: Sitting, BP Cuff Size: Adult)   Pulse 79   Temp 36.5 °C (97.7 °F)   Resp 16   Wt 87.2 kg (192 lb 3.2 oz)   SpO2 91% Comment: pts states he uses at home O2  BMI 27.58 kg/m²  Body mass index is 27.58 kg/m².    Gen: Alert and oriented, No apparent distress.  Neck: Neck is supple without lymphadenopathy.  Lungs: Normal effort, CTA bilaterally, no wheezes, rhonchi, or rales  CV: Regular rate and rhythm. No murmurs, rubs, or gallops.  Ext: No clubbing, cyanosis, edema.  MSK: Patient is able to bear weight on both feet.  Ankles: No noticeable deformity, swelling or bruising. ROM intact.  No tenderness to palpation.  No swelling.  No edema.  Changes in hair distribution along bilateral legs. sensation intact. 2+ pedal pulses. Ankle reflex 2+.  No significant difference between bilateral feet/ankles.    Assessment & Plan:     76 y.o. male with the following -     1. Foot swelling  Chronic, stable.  Patient notes foot swelling for several months.  Patient notes that swelling is improved in the morning.  Patient wells criteria score is 0.  Encourage patient to trial compression socks, increase walking and decrease prolonged standing, increase elevation of legs, decrease salt intake, and use Voltaren gel and heating pads as needed for pain.  Discussed differential with patient.  Encouraged patient to return if no improvement.    2.  Dyslipidemia  Chronic, stable.  Medication refilled today.  - atorvastatin (LIPITOR) 40 MG Tab; Take 1 tablet by mouth every day.  Dispense: 90 tablet; Refill: 3     Return if symptoms worsen or fail to improve.    Please note that this dictation was created using voice recognition software. I have made every reasonable attempt to correct obvious errors, but I expect that there are errors of grammar and possibly content that I did not discover before finalizing the note.

## 2021-08-05 ENCOUNTER — SLEEP CENTER VISIT (OUTPATIENT)
Dept: SLEEP MEDICINE | Facility: MEDICAL CENTER | Age: 76
End: 2021-08-05
Payer: MEDICARE

## 2021-08-05 VITALS
WEIGHT: 190 LBS | HEART RATE: 80 BPM | SYSTOLIC BLOOD PRESSURE: 134 MMHG | DIASTOLIC BLOOD PRESSURE: 72 MMHG | HEIGHT: 70 IN | BODY MASS INDEX: 27.2 KG/M2 | OXYGEN SATURATION: 94 %

## 2021-08-05 DIAGNOSIS — I10 HYPERTENSION, UNSPECIFIED TYPE: ICD-10-CM

## 2021-08-05 DIAGNOSIS — G89.4 CHRONIC PAIN SYNDROME: ICD-10-CM

## 2021-08-05 DIAGNOSIS — J44.9 COPD MIXED TYPE (HCC): ICD-10-CM

## 2021-08-05 DIAGNOSIS — R09.02 HYPOXIA: ICD-10-CM

## 2021-08-05 DIAGNOSIS — G47.31 COMPLEX SLEEP APNEA SYNDROME: ICD-10-CM

## 2021-08-05 DIAGNOSIS — F51.01 PRIMARY INSOMNIA: ICD-10-CM

## 2021-08-05 PROCEDURE — 99213 OFFICE O/P EST LOW 20 MIN: CPT | Performed by: NURSE PRACTITIONER

## 2021-08-05 ASSESSMENT — FIBROSIS 4 INDEX: FIB4 SCORE: 1.94

## 2021-08-05 NOTE — PROGRESS NOTES
Chief Complaint   Patient presents with   • Follow-Up     Complex sleep apnea- Last seen 05/05/2021       HPI:      Mr. Parikh is a 75 y/o male patient who is in today for CSA f/u. PMH includes post herpatic neuralgia, shingles, HTN, psychiatric problems, eczema, ED, COPD mixed type, former smoker 1 pack/day for 39 years quit 11/1/2000, hypoxia, arthritis, tonsillectomy, snoring, daytime sleepiness, insomnia.     Patient has a ResMed auto CPAP machine.  Compliance report from 7/6/21-8/4/21 was downloaded and reviewed with the patient which showed autoCPAP 10-16 cmH2O with 2L O2 bleed in, 100% compliance, 5 hrs 36 min use, AHI of 10.3. He is tolerating the pressure and mask well. He goes to bed between 1-2 am and wakes up between 8-9 am.  He states that he does not take any sleep aids however the tizanidine allows him to relax enough to allow him to fall asleep. He denies morning headache or snoring. He follows up with Dr. Saunders for chronic low back pain for which he takes oxycodone 10/325 mg qid prn and zanaflex 4 mg qhs prn. He also takes gabapentin 300 mg 3 tabs tid for post herpetic neuralgia. His COPD is managed by his PCP with whom he would like to continue his COPD care. He uses advair and albuterol. He denies SOB at rest, but is now using oxygen at 2 L 24/7 which has also helped decrease the shortness of breath with exertion.  Patient states he will be traveling to [facility] on October 1 and inquires about obtaining a portable oxygen concentrator.  He denies any new health problems or medications.    Sleep Study History:   PSG split night study from 1/26/21 indicated severe complex sleep apnea with an AHI of 49.3.  Central apnea was evident at 24% during the diagnostic portion and 36% during the treatment portion of the study. Patient spent 109.1 minutes below 89% O2.  CPAP was started at a pressure of 4 cm H2O and titrated as high as 14 cm H2O. At 13 cmH2O the AHI was 0.0, min SpO2 was 89%, max was  93%. Recommend ACPAP 13 cm with Airfit F20 mask. Consider supplemental O2 bleed in.     Multiox from 1/19/21 indicated he dropped from the 90s down to 86 with exertion. Therefore, O2 2 L a minute as needed for exercise was prescribed.     ROS:    Constitutional: Denies fevers, Denies weight changes  Eyes: Denies changes in vision, no eye pain  Ears/Nose/Throat/Mouth: Denies nasal congestion or sore throat   Cardiovascular: Denies chest pain or palpitations   Respiratory: Denies shortness of breath , Denies cough  Gastrointestinal/Hepatic: Denies abdominal pain, nausea, vomiting,   Skin/Breast: Denies rash,   Neurological: Denies headache, confusion,   Psychiatric: denies mood disorder   Sleep: denies snoring       Past Medical History:   Diagnosis Date   • Anesthesia    • Apnea, sleep    • Arthritis    • Asthma     daily inhaler   • Chickenpox    • Cold 01/2019   • Daytime sleepiness    • Dental disorder     full dentures   • Emphysema of lung (HCC)    • Gasping for breath    • [place] measles    • Heart burn    • Hypertension    • Pain     back   • Pneumonia 2018   • Psychiatric problem     anxiety   • Shingles 03/01/2018   • Shingles 05/01/2018   • Snoring        Past Surgical History:   Procedure Laterality Date   • LUMBAR FUSION O-ARM  10/9/2019    Procedure: FUSION, SPINE, LUMBAR, WITH O-ARM IMAGING GUIDANCE- L4-5 TLIF;  Surgeon: Nikolas Dooley M.D.;  Location: [facility];  Service: Neurosurgery   • LUMBAR LAMINECTOMY DISKECTOMY  10/9/2019    Procedure: LAMINECTOMY, SPINE, LUMBAR, WITH DISCECTOMY- REDO L3-S1 LAMI;  Surgeon: Nikolas Dooley M.D.;  Location: SURGERY [facility];  Service: Neurosurgery   • LUMBAR DECOMPRESSION  10/9/2019    Procedure: DECOMPRESSION, SPINE, LUMBAR- RIGHT L5 TRANSPEDICULAR;  Surgeon: Nikolas Dooley M.D.;  Location: SURGERY [facility];  Service: Neurosurgery   • LUMBAR LAMINECTOMY DISKECTOMY  3/14/2019    Procedure: LUMBAR LAMINECTOMY DISKECTOMY- L4-5, MEDIAL  FACETECTOMY;  Surgeon: Edmond Fung M.D.;  Location: SURGERY [facility];  Service: Neurosurgery   • FORAMINOTOMY Bilateral 3/14/2019    Procedure: FORAMINOTOMY;  Surgeon: Edmond Fung M.D.;  Location: SURGERY [facility];  Service: Neurosurgery   • OTHER      T&A   • TONSILLECTOMY         Family History   Problem Relation Age of Onset   • Cancer Father         Esophogial/smoker   • No Known Problems Mother    • No Known Problems Brother    • No Known Problems Son    • No Known Problems Daughter    • No Known Problems Daughter    • No Known Problems Maternal Grandmother    • No Known Problems Maternal Grandfather    • No Known Problems Paternal Grandmother    • No Known Problems Paternal Grandfather    • Sleep Apnea Neg Hx        Social History     Socioeconomic History   • Marital status:      Spouse name: Not on file   • Number of children: Not on file   • Years of education: Not on file   • Highest education level: Not on file   Occupational History   • Not on file   Tobacco Use   • Smoking status: Former Smoker     Packs/day: 1.00     Years: 39.00     Pack years: 39.00     Types: Cigarettes     Quit date: 2000     Years since quittin.7   • Smokeless tobacco: Never Used   • Tobacco comment: started smoking at age 16   Vaping Use   • Vaping Use: Never used   Substance and Sexual Activity   • Alcohol use: No   • Drug use: No   • Sexual activity: Yes     Partners: Female   Other Topics Concern   • Not on file   Social History Narrative   • Not on file     Social Determinants of Health     Financial Resource Strain:    • Difficulty of Paying Living Expenses:    Food Insecurity:    • Worried About Running Out of Food in the Last Year:    • Ran Out of Food in the Last Year:    Transportation Needs:    • Lack of Transportation (Medical):    • Lack of Transportation (Non-Medical):    Physical Activity:    • Days of Exercise per Week:    • Minutes of Exercise per Session:    Stress:     • Feeling of Stress :    Social Connections:    • Frequency of Communication with Friends and Family:    • Frequency of Social Gatherings with Friends and Family:    • Attends [employer] Services:    • Active Member of Clubs or Organizations:    • Attends Club or Organization Meetings:    • Marital Status:    Intimate Partner Violence:    • Fear of Current or Ex-Partner:    • Emotionally Abused:    • Physically Abused:    • Sexually Abused:        Allergies as of 08/05/2021   • (No Known Allergies)        Vitals:  Vitals:    08/05/21 1012   BP: 134/72   Pulse: 80   SpO2: 94%       Current medications as of today   Current Outpatient Medications   Medication Sig Dispense Refill   • pregabalin (LYRICA) 50 MG capsule TAKE 1 CAPSULE BY MOUTH TWICE A DAY AS DIRECTED     • albuterol 108 (90 Base) MCG/ACT Aero Soln inhalation aerosol Inhale 2 Puffs every 6 hours as needed. 1 Each 11   • fluticasone-salmeterol (ADVAIR) 100-50 MCG/DOSE AEROSOL POWDER, BREATH ACTIVATED Inhale 1 Puff by mouth every 12 hours. 180 Each 0   • ASPIRIN 81 PO Take  by mouth.     • oxyCODONE-acetaminophen (PERCOCET-10)  MG Tab Take 1 Tab by mouth every four hours as needed for Severe Pain.     • gabapentin (NEURONTIN) 300 MG Cap Take 900 mg by mouth 3 times a day.     • atorvastatin (LIPITOR) 40 MG Tab Take 1 tablet by mouth every day. (Patient not taking: Reported on 8/5/2021) 90 tablet 3   • triamcinolone acetonide (KENALOG) 0.1 % Ointment APPLY TO AFFECTED AREA TWICE A DAY 15 g 0   • Misc. Devices Misc Overnight pulse oximetry to check for apnea episodes and low oxygen. 1 Each 11   • tiotropium (SPIRIVA) 18 MCG Cap Inhale 1 Cap by mouth every day. (Patient not taking: Reported on 8/5/2021) 90 Cap 2   • tizanidine (ZANAFLEX) 4 MG Tab Take 1 Tab by mouth every 6 hours as needed. 30 Tab 0   • Polyethylene Glycol 3350 (MIRALAX PO) Take  by mouth. (Patient not taking: Reported on 8/5/2021)     • cloNIDine (CATAPRES) 0.1 MG Tab Take 0.1 mg by  mouth 2 times a day. (Patient not taking: Reported on 8/2/2021)       No current facility-administered medications for this visit.         Physical Exam: Limited by COVID-19 precautions.  Appearance: Well developed, well nourished, no acute distress  Eyes: PERRL, EOM intact, sclera white, conjunctiva moist  Ears: no lesions or deformities  Hearing: grossly intact  Nose: no lesions or deformities  Respiratory effort: no intercostal retractions or use of accessory muscles. Portable O2 tank via NC in place.   Extremities: no cyanosis or edema  Abdomen: soft   Gait and Station: normal  Digits and nails: no clubbing, cyanosis, petechiae or nodes.  Cranial nerves: grossly intact  Skin: no visible rashes, lesions or ulcers noted  Orientation: Oriented to time, person and place  Mood and affect: mood and affect appropriate, normal interaction with examiner  Judgement: Intact    Assessment:  1. Complex sleep apnea syndrome     2. COPD mixed type (HCC)     3. Hypoxia     4. Hypertension, unspecified type     5. BMI 27.0-27.9,adult     6. Primary insomnia     7. Chronic pain syndrome           Plan  Discussed the cardiovascular and neuropsychiatric risks of untreated CSA; including but not limited to: HTN, DM, MI, ASCVD, CVA, CHF, traffic accidents.     1. Compliance report from 7/6/21-8/4/21 was downloaded and reviewed with the patient which showed autoCPAP 10-16 cmH2O with 2L O2 bleed in, 100% compliance, 5 hrs 36 min use, AHI of 10.3. Continue autoCPAP. Patient is compliant and benefiting from autoCPAP therapy for management of CSA. Advised patient to continue to use the CPAP every night for more than four hours for optimal health benefit and to meet the health insurance 70% compliance guideline.     *DME order ([facility] for supplies and O2 supplies) for mask (large FFM or MOC) and supplies was not needed today. Continue to clean mask and supplies weekly with soap and water, and change supplies per insurance guidelines.      *DME order to increase autoCPAP to 13-17 cmH2O was placed today due to elevated AHI.     2-4. Continue to f/u with PCP for BP and COPD management. Patient would prefer that PCP continue managing COPD. Continue using 2L O2 with exertion and at night. Patient was provided with Rx copy for POC that was sent to [facility] in April 2021.   5. Continue to stay active.    6. Sleep hygiene discussed. Recommend keeping a set sleep/wake schedule. Logging enough hours of sleep. Limiting/Avoiding naps. No caffeine after noon and no heavy meals in the evening.   Chronic insomnia: Patient is not taking any sleep aids.  Tizanidine to help with chronic back pain allows the patient to sleep.  7. Follow up with the appropriate healthcare practitioners for all other medical problems. Continue to f/u with Dr. Saunders, pain specialist for chronic back pain. Currently on oxcydone 10/325 mg qid prn and zanaflex 4 mg qhs prn. He also takes gabapentin 300 mg 3 tabs tid for post herpetic neuralgia.   8. F/u in 5 weeks for CSA.       PITA Nance.      This dictation was created using voice recognition software. The accuracy of the dictation is limited to the abilities of the software. I expect there may be some errors of grammar and possibly content.

## 2021-08-06 DIAGNOSIS — J44.9 COPD MIXED TYPE (HCC): ICD-10-CM

## 2021-08-10 NOTE — TELEPHONE ENCOUNTER
1. Caller Name: Bayron Parikh                        Call Back Number: [phone]      How would the patient prefer to be contacted with a response: Phone call OK to leave a detailed message    2. SPECIFIC Action To Be Taken: Pt called requesting wixela inhaler 100-50. No the one that was sent to the pharmacy few days ago.     3. Diagnosis/Clinical Reason for Request: breathing issues.    4. Specialty & Provider Name/Lab/Imaging Location: Pt would like a call back from the PCP or, Medical Assistant.    5. Is appointment scheduled for requested order/referral: no    Patient was informed they will receive a return phone call from the office ONLY if there are any questions before processing their request. Advised to call back if they haven't received a call from the referral department in 5 days.

## 2021-08-30 ENCOUNTER — OFFICE VISIT (OUTPATIENT)
Dept: MEDICAL GROUP | Facility: MEDICAL CENTER | Age: 76
End: 2021-08-30
Payer: MEDICARE

## 2021-08-30 VITALS
HEIGHT: 70 IN | WEIGHT: 190 LBS | DIASTOLIC BLOOD PRESSURE: 60 MMHG | SYSTOLIC BLOOD PRESSURE: 110 MMHG | OXYGEN SATURATION: 92 % | BODY MASS INDEX: 27.2 KG/M2 | TEMPERATURE: 97.1 F | HEART RATE: 45 BPM | RESPIRATION RATE: 16 BRPM

## 2021-08-30 DIAGNOSIS — J44.9 COPD MIXED TYPE (HCC): ICD-10-CM

## 2021-08-30 DIAGNOSIS — G47.33 OSA (OBSTRUCTIVE SLEEP APNEA): ICD-10-CM

## 2021-08-30 PROBLEM — Z92.29 UP TO DATE WITH INFLUENZA VACCINATION: Status: RESOLVED | Noted: 2021-01-19 | Resolved: 2021-08-30

## 2021-08-30 PROCEDURE — 99213 OFFICE O/P EST LOW 20 MIN: CPT | Performed by: STUDENT IN AN ORGANIZED HEALTH CARE EDUCATION/TRAINING PROGRAM

## 2021-08-30 ASSESSMENT — FIBROSIS 4 INDEX: FIB4 SCORE: 1.94

## 2021-08-30 NOTE — PROGRESS NOTES
"Subjective:     CC: COPD follow-up    HPI:   Bayron presents today with  COPD mixed type (HCC)  Patient with COPD.  Patient former smoker but stopped approximately 20 years ago.  Patient states that he does feel his lungs are worsening.  Patient taking fluticasone-salmeterol inhaler, albuterol, nebulizer.  Patient presents today with concern that he is out of his fluticasone-salmeterol inhaler.  Patient had requested refills approximately 2 weeks ago and medication was refilled as brand name Advair instead of Wixela.  Patient states that he has not picked up the Advair inhaler.  Patient states that he is taking his medications as prescribed.  Patient states that he does take albuterol almost daily.  Patient states that he takes it despite need.  Patient educated on use of medications.  Patient notes no chest pain or shortness of breath.    BRANDEE (obstructive sleep apnea)  Patient continues on CPAP.  Patient continues to follow with pulmonary.  Review of recent sleep medicine visit.          ROS:  Gen: no fevers/chills  Pulm: no sob, no cough  CV: no chest pain, no palpitation  : no dysuria  MSk: no myalgias  Skin: no rash  Neuro: no headaches      Objective:     Exam:  /60 (BP Location: Right arm, Patient Position: Sitting, BP Cuff Size: Adult)   Pulse (!) 45   Temp 36.2 °C (97.1 °F) (Temporal)   Resp 16   Ht 1.778 m (5' 10\")   Wt 86.2 kg (190 lb)   SpO2 92%   BMI 27.26 kg/m²  Body mass index is 27.26 kg/m².    Gen: Alert and oriented, No apparent distress.  Neck: Neck is supple without lymphadenopathy.  Lungs: Normal effort, CTA bilaterally, no wheezes, rhonchi, or rales  CV: Regular rate and rhythm. No murmurs, rubs, or gallops.  Ext: No clubbing, cyanosis, edema.    Assessment & Plan:     76 y.o. male with the following -     1. COPD mixed type (HCC)  Chronic, stable.  Patient presents today for medication refill of fluticasone-salmeterol inhaler.  Patient provided education on how to use these " medications.  Patient encouraged to continue taking this medication twice daily.  Patient encouraged to only use albuterol inhaler as needed.  We will continue to follow.  - fluticasone-salmeterol (WIXELA INHUB) 100-50 MCG/DOSE AEROSOL POWDER, BREATH ACTIVATED; Inhale 1 Puff every 12 hours.  Dispense: 1 Each; Refill: 6      Return if symptoms worsen or fail to improve.    Please note that this dictation was created using voice recognition software. I have made every reasonable attempt to correct obvious errors, but I expect that there are errors of grammar and possibly content that I did not discover before finalizing the note.

## 2021-08-30 NOTE — ASSESSMENT & PLAN NOTE
Patient continues on CPAP.  Patient continues to follow with pulmonary.  Review of recent sleep medicine visit.

## 2021-08-30 NOTE — ASSESSMENT & PLAN NOTE
Patient with COPD.  Patient former smoker but stopped approximately 20 years ago.  Patient states that he does feel his lungs are worsening.  Patient taking fluticasone-salmeterol inhaler, albuterol, nebulizer.  Patient presents today with concern that he is out of his fluticasone-salmeterol inhaler.  Patient had requested refills approximately 2 weeks ago and medication was refilled as brand name Advair instead of Wixela.  Patient states that he has not picked up the Advair inhaler.  Patient states that he is taking his medications as prescribed.  Patient states that he does take albuterol almost daily.  Patient states that he takes it despite need.  Patient educated on use of medications.  Patient notes no chest pain or shortness of breath.

## 2021-09-27 ENCOUNTER — APPOINTMENT (OUTPATIENT)
Dept: MEDICAL GROUP | Facility: MEDICAL CENTER | Age: 76
End: 2021-09-27
Payer: MEDICARE

## 2021-09-27 ENCOUNTER — APPOINTMENT (OUTPATIENT)
Dept: SLEEP MEDICINE | Facility: MEDICAL CENTER | Age: 76
End: 2021-09-27
Payer: MEDICARE

## 2021-11-05 ENCOUNTER — OFFICE VISIT (OUTPATIENT)
Dept: MEDICAL GROUP | Facility: MEDICAL CENTER | Age: 76
End: 2021-11-05
Payer: MEDICARE

## 2021-11-05 VITALS
WEIGHT: 178 LBS | BODY MASS INDEX: 25.48 KG/M2 | HEART RATE: 74 BPM | TEMPERATURE: 99 F | HEIGHT: 70 IN | DIASTOLIC BLOOD PRESSURE: 64 MMHG | SYSTOLIC BLOOD PRESSURE: 132 MMHG | OXYGEN SATURATION: 93 %

## 2021-11-05 DIAGNOSIS — G89.29 CHRONIC BILATERAL LOW BACK PAIN WITH LEFT-SIDED SCIATICA: ICD-10-CM

## 2021-11-05 DIAGNOSIS — Z23 NEED FOR VACCINATION: ICD-10-CM

## 2021-11-05 DIAGNOSIS — J44.9 COPD MIXED TYPE (HCC): ICD-10-CM

## 2021-11-05 DIAGNOSIS — F51.01 PRIMARY INSOMNIA: ICD-10-CM

## 2021-11-05 DIAGNOSIS — M54.42 CHRONIC BILATERAL LOW BACK PAIN WITH LEFT-SIDED SCIATICA: ICD-10-CM

## 2021-11-05 DIAGNOSIS — G47.33 OSA (OBSTRUCTIVE SLEEP APNEA): ICD-10-CM

## 2021-11-05 DIAGNOSIS — I10 ESSENTIAL (PRIMARY) HYPERTENSION: ICD-10-CM

## 2021-11-05 PROCEDURE — 99214 OFFICE O/P EST MOD 30 MIN: CPT | Mod: 25 | Performed by: STUDENT IN AN ORGANIZED HEALTH CARE EDUCATION/TRAINING PROGRAM

## 2021-11-05 PROCEDURE — 90662 IIV NO PRSV INCREASED AG IM: CPT | Performed by: STUDENT IN AN ORGANIZED HEALTH CARE EDUCATION/TRAINING PROGRAM

## 2021-11-05 PROCEDURE — G0008 ADMIN INFLUENZA VIRUS VAC: HCPCS | Performed by: STUDENT IN AN ORGANIZED HEALTH CARE EDUCATION/TRAINING PROGRAM

## 2021-11-05 RX ORDER — TRAZODONE HYDROCHLORIDE 50 MG/1
50 TABLET ORAL NIGHTLY PRN
Qty: 90 TABLET | Refills: 3 | Status: SHIPPED | OUTPATIENT
Start: 2021-11-05 | End: 2022-01-27

## 2021-11-05 NOTE — PROGRESS NOTES
Subjective:     CC: Establish care, follow-up COPD    HPI:   Bayron presents today to establish care    Problem   Ike (Obstructive Sleep Apnea)    Compliant with CPAP.  He is only able to sleep about 3 or 4 hours a night, sometimes 5.  Does not believe this is attributable to the CPAP     Copd Mixed Type (Hcc)    Had spirometry in 2018, reviewed results, moderate obstruction with some improvement with bronchodilator.  Consistent with COPD however do not see full PFTs.    Using albuterol a few times/day.  Compliant with fluticasone salmeterol inhaler twice a day.  He does believe his symptoms slightly worsening     Chronic Bilateral Low Back Pain With Left-Sided Sciatica    Takes Percocet fairly regularly  Is not interested in any further pain management or spine surgery consult     Primary Insomnia    Patient only gets 3 to 5 hours of sleep at night.  He practices decent sleep hygiene.  Is not opposed to trying something for sleep         Current Outpatient Medications Ordered in Epic   Medication Sig Dispense Refill   • traZODone (DESYREL) 50 MG Tab Take 1 Tablet by mouth at bedtime as needed for Sleep. 90 Tablet 3   • fluticasone-salmeterol (WIXELA INHUB) 100-50 MCG/DOSE AEROSOL POWDER, BREATH ACTIVATED Inhale 1 Puff every 12 hours. 1 Each 6   • albuterol 108 (90 Base) MCG/ACT Aero Soln inhalation aerosol Inhale 2 Puffs every 6 hours as needed. 1 Each 11   • triamcinolone acetonide (KENALOG) 0.1 % Ointment APPLY TO AFFECTED AREA TWICE A DAY 15 g 0   • Misc. Devices Misc Overnight pulse oximetry to check for apnea episodes and low oxygen. 1 Each 11   • ASPIRIN 81 PO Take  by mouth.     • oxyCODONE-acetaminophen (PERCOCET-10)  MG Tab Take 1 Tab by mouth every four hours as needed for Severe Pain.     • tizanidine (ZANAFLEX) 4 MG Tab Take 1 Tab by mouth every 6 hours as needed. 30 Tab 0     No current Epic-ordered facility-administered medications on file.       Health Maintenance: Completed    ROS:  Gen: no  "fevers/chills, no changes in weight  Eyes: no changes in vision  ENT: no sore throat, no hearing loss, no bloody nose  Pulm: See HPI  CV: no chest pain, no palpitations  GI: no nausea/vomiting, no diarrhea  : no dysuria  MSk: no myalgias  Skin: no rash  Neuro: no headaches, no numbness/tingling  Heme/Lymph: no easy bruising      Objective:     Exam:  /64 (BP Location: Left arm, Patient Position: Sitting, BP Cuff Size: Adult)   Pulse 74   Temp 37.2 °C (99 °F) (Temporal)   Ht 1.778 m (5' 10\")   Wt 80.7 kg (178 lb)   SpO2 93%   BMI 25.54 kg/m²  Body mass index is 25.54 kg/m².    Gen: Alert and oriented, No apparent distress.  Neck: Neck is supple without lymphadenopathy.  Lungs: Normal effort, mild rhonchi no wheezing  CV: Regular rate and rhythm. .  Ext: No clubbing, cyanosis, edema.        Assessment & Plan:     76 y.o. male with the following -     Problem List Items Addressed This Visit     Chronic bilateral low back pain with left-sided sciatica     No changes in management at this time           Relevant Medications    traZODone (DESYREL) 50 MG Tab    Primary insomnia     Trial of trazodone 50 mg nightly         Relevant Medications    traZODone (DESYREL) 50 MG Tab    COPD mixed type (HCC)     We will get complete pulmonary function test  Continue current medications         Relevant Medications    traZODone (DESYREL) 50 MG Tab    Other Relevant Orders    PULMONARY FUNCTION TESTS -Test requested: Complete Pulmonary Function Test    Comp Metabolic Panel    Lipid Profile    CBC WITHOUT DIFFERENTIAL    BRANDEE (obstructive sleep apnea)     Continue CPAP           Other Visit Diagnoses     Need for vaccination        Relevant Orders    Influenza Vaccine, High Dose (65+ Only) (Completed)    Essential (primary) hypertension         Relevant Orders    Lipid Profile        Return in about 3 months (around 2/5/2022) for AWV.    Please note that this dictation was created using voice recognition software. I have " made every reasonable attempt to correct obvious errors, but I expect that there are errors of grammar and possibly content that I did not discover before finalizing the note.

## 2021-12-09 ENCOUNTER — HOSPITAL ENCOUNTER (OUTPATIENT)
Dept: PULMONOLOGY | Facility: MEDICAL CENTER | Age: 76
End: 2021-12-09
Attending: STUDENT IN AN ORGANIZED HEALTH CARE EDUCATION/TRAINING PROGRAM
Payer: MEDICARE

## 2021-12-09 DIAGNOSIS — J44.9 COPD MIXED TYPE (HCC): ICD-10-CM

## 2021-12-09 PROCEDURE — 94060 EVALUATION OF WHEEZING: CPT | Mod: 26 | Performed by: INTERNAL MEDICINE

## 2021-12-09 PROCEDURE — 94729 DIFFUSING CAPACITY: CPT

## 2021-12-09 PROCEDURE — 94726 PLETHYSMOGRAPHY LUNG VOLUMES: CPT | Mod: 26 | Performed by: INTERNAL MEDICINE

## 2021-12-09 PROCEDURE — 94060 EVALUATION OF WHEEZING: CPT

## 2021-12-09 PROCEDURE — 94729 DIFFUSING CAPACITY: CPT | Mod: 26 | Performed by: INTERNAL MEDICINE

## 2021-12-09 PROCEDURE — 94726 PLETHYSMOGRAPHY LUNG VOLUMES: CPT

## 2021-12-09 RX ORDER — ALBUTEROL SULFATE 90 UG/1
2 AEROSOL, METERED RESPIRATORY (INHALATION)
Status: DISCONTINUED | OUTPATIENT
Start: 2021-12-09 | End: 2021-12-10 | Stop reason: HOSPADM

## 2021-12-09 ASSESSMENT — PULMONARY FUNCTION TESTS
FVC_PREDICTED: 3.96
FEV1/FVC_PREDICTED: 74.90
FVC: 3.85
FEV1/FVC_PERCENT_PREDICTED: 73
FEV1: 2.12
FEV1_LLN: 2.46
FEV1/FVC_PERCENT_PREDICTED: 74
FVC_PERCENT_PREDICTED: 97
FEV1/FVC: 54.56
FEV1/FVC_PERCENT_PREDICTED: 73
FVC_LLN: 3.31
FEV1/FVC_PERCENT_LLN: 62.54
FEV1/FVC: 55
FEV1/FVC_PERCENT_PREDICTED: 74
FVC_LLN: 3.31
FEV1_PERCENT_CHANGE: 0
FEV1/FVC: 55.42
FEV1_PERCENT_CHANGE: 0
FVC: 3.88
FEV1: 2.13
FEV1/FVC_PERCENT_LLN: 62.54
FEV1_PERCENT_PREDICTED: 71
FEV1/FVC: 55.32
FEV1/FVC_PERCENT_CHANGE: 1
FEV1/FVC_PERCENT_PREDICTED: 72
FEV1_PERCENT_PREDICTED: 72
FEV1_PREDICTED: 2.94
FVC_PERCENT_PREDICTED: 97
FEV1_LLN: 2.46

## 2021-12-13 NOTE — PROCEDURES
DATE OF SERVICE:  12/09/2021     PULMONARY FUNCTION TEST INTERPRETATION  Patient was unable to perform to ATS standards for repeatability. Results reflects patient best effort.     SPIROMETRY:  The spirometry showed mild obstruction, manifested by an FEV1/FVC of 54.56%.    The FEV1 was 2.13, which is 72% of predicted in the post=bronchodilator arm.     There is no response to bronchodilators in this test, although this does not   preclude the patient from receiving treatment with them.     LUNG VOLUMES:  The lung volumes manifest air trapping, evidenced by an RV of   124% (3.25), TLC is 99%, which is within normal limits. (7.03)     There is mild diffusion impairment that corrects to VA.  DLCO was 67% and corrects to 87%.     The flow volume loops correlate with the information above.        ______________________________  MD VELASQUEZ Head[place]/OSMAR/VIRGINIE    DD:  12/12/2021 18:49  DT:  12/12/2021 19:03    Job#:  [id number]

## 2022-01-04 ENCOUNTER — OFFICE VISIT (OUTPATIENT)
Dept: SLEEP MEDICINE | Facility: MEDICAL CENTER | Age: 77
End: 2022-01-04
Payer: MEDICARE

## 2022-01-04 VITALS
HEART RATE: 81 BPM | BODY MASS INDEX: 25.48 KG/M2 | WEIGHT: 178 LBS | HEIGHT: 70 IN | OXYGEN SATURATION: 94 % | DIASTOLIC BLOOD PRESSURE: 82 MMHG | SYSTOLIC BLOOD PRESSURE: 132 MMHG

## 2022-01-04 DIAGNOSIS — G47.31 COMPLEX SLEEP APNEA SYNDROME: ICD-10-CM

## 2022-01-04 DIAGNOSIS — G89.4 CHRONIC PAIN SYNDROME: ICD-10-CM

## 2022-01-04 PROCEDURE — 99214 OFFICE O/P EST MOD 30 MIN: CPT | Performed by: NURSE PRACTITIONER

## 2022-01-04 NOTE — PATIENT INSTRUCTIONS
Patient is still having an increase in events per hour.  Compliance was reviewed and shows an AHI of 9.7.  Patient states on the bad nights, he is unable to sleep and he wakes up with a morning headache.  I make an adjustment once more to the auto CPAP.  New settings will be 13 to 20 cm/H2O.  On nights with elevated events per hour, the machine is maxing out at 17 cm/H2O.  He may benefit from increasing pressures and we will reassess in approximately 2 months.  Patient also requesting copy of order for oxygen concentrator.  This was provided for patient.

## 2022-01-04 NOTE — PROGRESS NOTES
Chief Complaint   Patient presents with   • Follow-Up     PFT Result    • Follow-Up     POC order       HPI:  Bayron Parikh is a 76 y.o. year old male here today for follow-up on complex sleep apnea. PMH includes post herpatic neuralgia, shingles, HTN, psychiatric problems, eczema, ED, COPD (managed by PCP), former smoker w a 39 pack year history, quit 11/1/2000, hypoxia, arthritis, tonsillectomy, snoring, daytime sleepiness, and insomnia. He follows up with Dr. Saunders for chronic low back pain for which he takes oxycodone 10/325 mg qid prn and zanaflex 4 mg qhs prn. He also takes gabapentin 300 mg 3 tabs tid for post herpetic neuralgia.  Last seen 8/5/2021 by AIMEE March.  At last visit, the patient was previously on auto CPAP at 10 to 16 cm/H2O with 2 L supplemental oxygen bleed in.  AHI on previous compliance was 10.3.  They increased auto CPAP to 13 to 17 cm/H2O due to these concerns.     Patient denies any difficulty tolerating mask or pressures, and denies any mask leakage at this time.  He does state that he does know when he has elevated events per hour as he is unable to sleep.  This most always results in a morning headache for this patient.    Compliance was reviewed and does show 83% use with an average time of 5 hours and 18 minutes and a resultant AHI of 9.7 with an obstructive apnea index of 5.9 and a central index of 0.2.  He remains on auto CPAP at 13 to 17 cm/H2O on a ResMed device.    Sleep Study History:   PSG split night study from 1/26/21 indicated severe complex sleep apnea with an AHI of 49.3.  Central apnea was evident at 24% during the diagnostic portion and 36% during the treatment portion of the study. Patient spent 109.1 minutes below 89% O2.  CPAP was started at a pressure of 4 cm H2O and titrated as high as 14 cm H2O. At 13 cmH2O the AHI was 0.0, min SpO2 was 89%, max was 93%. Recommend ACPAP 13 cm with Airfit F20 mask. Consider supplemental O2 bleed in.      Multiox from  1/19/21 indicated he dropped from the 90s down to 86 with exertion. Therefore, O2 2 L a minute as needed for exercise was prescribed.     ROS: As per HPI and otherwise negative if not stated.    Past Medical History:   Diagnosis Date   • Anesthesia    • Apnea, sleep    • Arthritis    • Asthma     daily inhaler   • Chickenpox    • Cold 01/2019   • Daytime sleepiness    • Dental disorder     full dentures   • Emphysema of lung (HCC)    • Gasping for breath    • [place] measles    • Heart burn    • Hypertension    • Pain     back   • Pneumonia 2018   • Psychiatric problem     anxiety   • Shingles 03/01/2018   • Shingles 05/01/2018   • Snoring        Past Surgical History:   Procedure Laterality Date   • LUMBAR FUSION O-ARM  10/9/2019    Procedure: FUSION, SPINE, LUMBAR, WITH O-ARM IMAGING GUIDANCE- L4-5 TLIF;  Surgeon: Nikolas Dooley M.D.;  Location: [facility];  Service: Neurosurgery   • LUMBAR LAMINECTOMY DISKECTOMY  10/9/2019    Procedure: LAMINECTOMY, SPINE, LUMBAR, WITH DISCECTOMY- REDO L3-S1 LAMI;  Surgeon: Nikolas Dooley M.D.;  Location: [facility];  Service: Neurosurgery   • LUMBAR DECOMPRESSION  10/9/2019    Procedure: DECOMPRESSION, SPINE, LUMBAR- RIGHT L5 TRANSPEDICULAR;  Surgeon: Nikolas Dooley M.D.;  Location: [facility];  Service: Neurosurgery   • LUMBAR LAMINECTOMY DISKECTOMY  3/14/2019    Procedure: LUMBAR LAMINECTOMY DISKECTOMY- L4-5, MEDIAL FACETECTOMY;  Surgeon: Edmond Fung M.D.;  Location: [facility];  Service: Neurosurgery   • FORAMINOTOMY Bilateral 3/14/2019    Procedure: FORAMINOTOMY;  Surgeon: Edmond Fung M.D.;  Location: [facility];  Service: Neurosurgery   • OTHER      T&A   • TONSILLECTOMY         Family History   Problem Relation Age of Onset   • Cancer Father         Esophogial/smoker   • No Known Problems Mother    • No Known Problems Brother    • No Known Problems Son    • No Known Problems Daughter    • No  "Known Problems Daughter    • No Known Problems Maternal Grandmother    • No Known Problems Maternal Grandfather    • No Known Problems Paternal Grandmother    • No Known Problems Paternal Grandfather    • Sleep Apnea Neg Hx        Allergies as of 01/04/2022   • (No Known Allergies)        Vitals:  /82 (BP Location: Left arm, Patient Position: Sitting, BP Cuff Size: Adult)   Pulse 81   Ht 1.778 m (5' 10\")   Wt 80.7 kg (178 lb)   SpO2 94%     Current medications as of today   Current Outpatient Medications   Medication Sig Dispense Refill   • traZODone (DESYREL) 50 MG Tab Take 1 Tablet by mouth at bedtime as needed for Sleep. 90 Tablet 3   • fluticasone-salmeterol (WIXELA INHUB) 100-50 MCG/DOSE AEROSOL POWDER, BREATH ACTIVATED Inhale 1 Puff every 12 hours. 1 Each 6   • albuterol 108 (90 Base) MCG/ACT Aero Soln inhalation aerosol Inhale 2 Puffs every 6 hours as needed. 1 Each 11   • triamcinolone acetonide (KENALOG) 0.1 % Ointment APPLY TO AFFECTED AREA TWICE A DAY 15 g 0   • ASPIRIN 81 PO Take  by mouth.     • oxyCODONE-acetaminophen (PERCOCET-10)  MG Tab Take 1 Tab by mouth every four hours as needed for Severe Pain.     • tizanidine (ZANAFLEX) 4 MG Tab Take 1 Tab by mouth every 6 hours as needed. 30 Tab 0   • Misc. Devices Misc Overnight pulse oximetry to check for apnea episodes and low oxygen. 1 Each 11     No current facility-administered medications for this visit.         Physical Exam:   Gen:           Alert and oriented, No apparent distress. Mood and affect appropriate, normal interaction with examiner.  Eyes:          PERRL, EOM intact, sclere white, conjunctive moist.  Ears:          Not examined.   Hearing:     Grossly intact.  Nose:          Normal, no lesions or deformities.  Dentition:    Good dentition.  Oropharynx:   Tongue normal, posterior pharynx without erythema or exudate.  Neck:        Supple, trachea midline, no masses.  Respiratory Effort: No intercostal retractions or use of " accessory muscles.   Lung Auscultation:      Clear to auscultation bilaterally; no rales, rhonchi or wheezing.  CV:            Regular rate and rhythm. No murmurs, rubs or gallops.  Abd:           Not examined.   Lymphadenopathy: Not examined.  Gait and Station: Normal.  Digits and Nails: No clubbing, cyanosis, petechiae, or nodes.   Cranial Nerves: II-XII grossly intact.  Skin:        No rashes, lesions or ulcers noted.               Ext:           No cyanosis or edema.      Assessment:  1. Complex sleep apnea syndrome  DME Other   2. Chronic pain syndrome         Immunizations:    Flu: 11/5/2021  Pneumovax 23: 8/19/2013  Prevnar 13: 11/27/2017  COVID-19: 2/6/2021, 2/27/2021, 11/20/2021    Plan:  Patient is still having an increase in events per hour.  Compliance was reviewed and shows an AHI of 9.7.  Patient states on the bad nights, he is unable to sleep and he wakes up with a morning headache.  I make an adjustment once more to the auto CPAP.  New settings will be 13 to 20 cm/H2O.  On nights with elevated events per hour, the machine is maxing out at 17 cm/H2O.  He may benefit from increasing pressures and we will reassess in approximately 2 months.  Patient also requesting copy of order for oxygen concentrator.  This was provided for patient.    Please note that this dictation was created using voice recognition software. I have made every reasonable attempt to correct obvious errors, but it is possible there are errors of grammar and possibly content that I did not discover before finalizing the note.

## 2022-01-24 ENCOUNTER — HOSPITAL ENCOUNTER (OUTPATIENT)
Dept: LAB | Facility: MEDICAL CENTER | Age: 77
End: 2022-01-24
Attending: STUDENT IN AN ORGANIZED HEALTH CARE EDUCATION/TRAINING PROGRAM
Payer: MEDICARE

## 2022-01-24 DIAGNOSIS — J44.9 COPD MIXED TYPE (HCC): ICD-10-CM

## 2022-01-24 DIAGNOSIS — I10 ESSENTIAL (PRIMARY) HYPERTENSION: ICD-10-CM

## 2022-01-24 LAB
ALBUMIN SERPL BCP-MCNC: 4.6 G/DL (ref 3.2–4.9)
ALBUMIN/GLOB SERPL: 1.7 G/DL
ALP SERPL-CCNC: 91 U/L (ref 30–99)
ALT SERPL-CCNC: 18 U/L (ref 2–50)
ANION GAP SERPL CALC-SCNC: 10 MMOL/L (ref 7–16)
AST SERPL-CCNC: 10 U/L (ref 12–45)
BILIRUB SERPL-MCNC: 0.3 MG/DL (ref 0.1–1.5)
BUN SERPL-MCNC: 20 MG/DL (ref 8–22)
CALCIUM SERPL-MCNC: 9.3 MG/DL (ref 8.5–10.5)
CHLORIDE SERPL-SCNC: 106 MMOL/L (ref 96–112)
CHOLEST SERPL-MCNC: 169 MG/DL (ref 100–199)
CO2 SERPL-SCNC: 26 MMOL/L (ref 20–33)
CREAT SERPL-MCNC: 0.82 MG/DL (ref 0.5–1.4)
ERYTHROCYTE [DISTWIDTH] IN BLOOD BY AUTOMATED COUNT: 47.4 FL (ref 35.9–50)
FASTING STATUS PATIENT QL REPORTED: NORMAL
GLOBULIN SER CALC-MCNC: 2.7 G/DL (ref 1.9–3.5)
GLUCOSE SERPL-MCNC: 105 MG/DL (ref 65–99)
HCT VFR BLD AUTO: 43.1 % (ref 42–52)
HDLC SERPL-MCNC: 41 MG/DL
HGB BLD-MCNC: 14.5 G/DL (ref 14–18)
LDLC SERPL CALC-MCNC: 94 MG/DL
MCH RBC QN AUTO: 32.6 PG (ref 27–33)
MCHC RBC AUTO-ENTMCNC: 33.6 G/DL (ref 33.7–35.3)
MCV RBC AUTO: 96.9 FL (ref 81.4–97.8)
PLATELET # BLD AUTO: 232 K/UL (ref 164–446)
PMV BLD AUTO: 12.3 FL (ref 9–12.9)
POTASSIUM SERPL-SCNC: 4.6 MMOL/L (ref 3.6–5.5)
PROT SERPL-MCNC: 7.3 G/DL (ref 6–8.2)
RBC # BLD AUTO: 4.45 M/UL (ref 4.7–6.1)
SODIUM SERPL-SCNC: 142 MMOL/L (ref 135–145)
TRIGL SERPL-MCNC: 171 MG/DL (ref 0–149)
WBC # BLD AUTO: 9.7 K/UL (ref 4.8–10.8)

## 2022-01-24 PROCEDURE — 85027 COMPLETE CBC AUTOMATED: CPT

## 2022-01-24 PROCEDURE — 80061 LIPID PANEL: CPT

## 2022-01-24 PROCEDURE — 36415 COLL VENOUS BLD VENIPUNCTURE: CPT

## 2022-01-24 PROCEDURE — 80053 COMPREHEN METABOLIC PANEL: CPT

## 2022-01-27 ENCOUNTER — OFFICE VISIT (OUTPATIENT)
Dept: MEDICAL GROUP | Facility: MEDICAL CENTER | Age: 77
End: 2022-01-27
Payer: MEDICARE

## 2022-01-27 VITALS
SYSTOLIC BLOOD PRESSURE: 142 MMHG | WEIGHT: 181.6 LBS | DIASTOLIC BLOOD PRESSURE: 64 MMHG | TEMPERATURE: 98.7 F | HEIGHT: 70 IN | OXYGEN SATURATION: 95 % | BODY MASS INDEX: 26 KG/M2 | HEART RATE: 83 BPM

## 2022-01-27 DIAGNOSIS — J44.9 COPD MIXED TYPE (HCC): ICD-10-CM

## 2022-01-27 DIAGNOSIS — F51.01 PRIMARY INSOMNIA: ICD-10-CM

## 2022-01-27 PROCEDURE — G0439 PPPS, SUBSEQ VISIT: HCPCS | Performed by: STUDENT IN AN ORGANIZED HEALTH CARE EDUCATION/TRAINING PROGRAM

## 2022-01-27 RX ORDER — AMOXICILLIN 875 MG/1
875 TABLET, COATED ORAL 2 TIMES DAILY
Qty: 14 TABLET | Refills: 0 | Status: SHIPPED | OUTPATIENT
Start: 2022-01-27 | End: 2022-02-03

## 2022-01-27 RX ORDER — TRAZODONE HYDROCHLORIDE 100 MG/1
200 TABLET ORAL NIGHTLY
Qty: 30 TABLET | Refills: 3 | Status: SHIPPED | OUTPATIENT
Start: 2022-01-27 | End: 2022-01-27 | Stop reason: SDUPTHER

## 2022-01-27 RX ORDER — TRAZODONE HYDROCHLORIDE 100 MG/1
200 TABLET ORAL NIGHTLY
Qty: 180 TABLET | Refills: 3 | Status: SHIPPED | OUTPATIENT
Start: 2022-01-27 | End: 2022-12-19

## 2022-01-27 ASSESSMENT — FIBROSIS 4 INDEX: FIB4 SCORE: 0.79

## 2022-01-27 ASSESSMENT — PATIENT HEALTH QUESTIONNAIRE - PHQ9: CLINICAL INTERPRETATION OF PHQ2 SCORE: 0

## 2022-01-27 ASSESSMENT — ACTIVITIES OF DAILY LIVING (ADL): BATHING_REQUIRES_ASSISTANCE: 0

## 2022-01-27 ASSESSMENT — ENCOUNTER SYMPTOMS: GENERAL WELL-BEING: FAIR

## 2022-01-27 NOTE — PROGRESS NOTES
Chief Complaint   Patient presents with   • Annual Wellness Visit     HPI:  Bayron is a 76 y.o. here for Medicare Annual Wellness Visit    He reports he has been using his nebulizer daily because that is what the pharmacist told him to, and using his Wixela every now and then. Patient was counseled on proper inhaler use. His symptoms at rest are fine but he does require oxygen on ambulation and feels a little short of breath.    After starting trazodone at his previous visit on 50 mg he did not have any effect for his insomnia, he increase this to 100 mg still with minimal effect. He is agreeable to increase the dose further.      Patient Active Problem List    Diagnosis Date Noted   • BRANDEE (obstructive sleep apnea) 01/19/2021   • Former smoker 01/19/2021   • Hypoxia 01/19/2021   • COPD mixed type (HCC) 08/06/2018   • Post herpetic neuralgia 05/23/2018   • Erectile dysfunction 11/27/2017   • Chronic bilateral low back pain with left-sided sciatica 12/08/2016   • Eczema 12/08/2016   • Primary insomnia 12/08/2016   • Spinal stenosis, lumbar 06/19/2015   • Chronic pain syndrome 11/20/2012       Current Outpatient Medications   Medication Sig Dispense Refill   • amoxicillin (AMOXIL) 875 MG tablet Take 1 Tablet by mouth 2 times a day for 7 days. 14 Tablet 0   • traZODone (DESYREL) 100 MG Tab Take 2 Tablets by mouth every evening. 180 Tablet 3   • Tiotropium Bromide Monohydrate 1.25 MCG/ACT Aero Soln Inhale 1 Puff every day. 1 Each 3   • fluticasone-salmeterol (WIXELA INHUB) 100-50 MCG/DOSE AEROSOL POWDER, BREATH ACTIVATED Inhale 1 Puff every 12 hours. 1 Each 6   • albuterol 108 (90 Base) MCG/ACT Aero Soln inhalation aerosol Inhale 2 Puffs every 6 hours as needed. 1 Each 11   • triamcinolone acetonide (KENALOG) 0.1 % Ointment APPLY TO AFFECTED AREA TWICE A DAY 15 g 0   • Misc. Devices Misc Overnight pulse oximetry to check for apnea episodes and low oxygen. 1 Each 11   • ASPIRIN 81 PO Take  by mouth.     •  oxyCODONE-acetaminophen (PERCOCET-10)  MG Tab Take 1 Tab by mouth every four hours as needed for Severe Pain.     • tizanidine (ZANAFLEX) 4 MG Tab Take 1 Tab by mouth every 6 hours as needed. 30 Tab 0     No current facility-administered medications for this visit.        Patient is taking medications as noted in medication list.  Current supplements as per medication list.     Allergies: Patient has no known allergies.    Current social contact/activities: Shooting targets, time with wife    Is patient current with immunizations? Yes.    He  reports that he quit smoking about 21 years ago. His smoking use included cigarettes. He has a 39.00 pack-year smoking history. He has never used smokeless tobacco. He reports that he does not drink alcohol and does not use drugs.  Counseling given: Not Answered  Comment: started smoking at age 16        DPA/Advanced directive: Patient has Advanced Directive on file.     ROS:    Gait: Uses a cane   Ostomy: No   Other tubes: No   Amputations: No   Chronic oxygen use No   Last eye exam N/A  Wears hearing aids: Yes  : Denies any urinary leakage during the last 6 months      Screening:        Depression Screening    Little interest or pleasure in doing things?  0 - not at all  Feeling down, depressed, or hopeless? 0 - not at all  Patient Health Questionnaire Score: 0    If depressive symptoms identified deferred to follow up visit unless specifically addressed in assessment and plan.    Interpretation of PHQ-9 Total Score   Score Severity   1-4 No Depression   5-9 Mild Depression   10-14 Moderate Depression   15-19 Moderately Severe Depression   20-27 Severe Depression    Screening for Cognitive Impairment    Three Minute Recall (captain, garden, picture)  3/3    Mike clock face with all 12 numbers and set the hands to show 5 past 8.  Yes    If cognitive concerns identified, deferred for follow up unless specifically addressed in assessment and plan.    Fall Risk  Assessment    Has the patient had two or more falls in the last year or any fall with injury in the last year?  No  If fall risk identified, deferred for follow up unless specifically addressed in assessment and plan.    Safety Assessment    Throw rugs on floor.  Yes  Handrails on all stairs.  Yes  Good lighting in all hallways.  Yes  Difficulty hearing.  Yes  Patient counseled about all safety risks that were identified.    Functional Assessment ADLs    Are there any barriers preventing you from cooking for yourself or meeting nutritional needs?  No.    Are there any barriers preventing you from driving safely or obtaining transportation?  No.    Are there any barriers preventing you from using a telephone or calling for help?  No.    Are there any barriers preventing you from shopping?  No.    Are there any barriers preventing you from taking care of your own finances?  No.    Are there any barriers preventing you from managing your medications?  No.    Are there any barriers preventing you from showering, bathing or dressing yourself?  No.    Are you currently engaging in any exercise or physical activity?  No.     What is your perception of your health?  Fair.    Health Maintenance Summary          Overdue - Annual Wellness Visit (Every 366 Days) Overdue since 8/29/2020 08/29/2019  Subsequent Annual Wellness Visit - Includes PPPS ()    08/29/2019  Visit Dx: Medicare annual wellness visit, subsequent    01/10/2018  Visit Dx: Medicare annual wellness visit, subsequent    12/28/2016  Visit Dx: Medicare annual wellness visit, subsequent          Annual Pulmonary Function Test / Spirometry (Yearly) Next due on 12/9/2022 12/09/2021  PFT DICTATED RESULTS    07/30/2018  PULMONARY FUNCTION TESTS Test requested: Spirometry with-out & with Bronchodilator    07/30/2018  PFT DICTATED RESULTS          IMM DTaP/Tdap/Td Vaccine (2 - Td or Tdap) Next due on 8/19/2023 08/19/2013  Imm Admin: Tdap Vaccine     2004  Imm Admin: TD Vaccine          IMM MENINGOCOCCAL VACCINE (MCV4) (Series Information) Aged Out    2004  Imm Admin: Meningococcal Polysaccharide Vaccine MPSV4 - HISTORICAL DATA          IMM HEP B VACCINE (Series Information) Aged Out    04/15/2004  Imm Admin: Hep A/HEP B Combined Vaccine (TwinRix)    2004  Imm Admin: Hep A/HEP B Combined Vaccine (TwinRix)          IMM PNEUMOCOCCAL VACCINE: 65+ Years (Series Information) Completed    2017  Imm Admin: Pneumococcal Conjugate Vaccine (Prevnar/PCV-13)    2013  Imm Admin: Pneumococcal polysaccharide vaccine (PPSV-23)          IMM ZOSTER VACCINES (Series Information) Completed    2018  Imm Admin: Zoster Vaccine Recombinant (RZV) (SHINGRIX)    2018  Imm Admin: Zoster Vaccine Recombinant (RZV) (SHINGRIX)          IMM INFLUENZA (Series Information) Completed    2021  Imm Admin: Influenza Vaccine Adult HD    10/04/2020  Imm Admin: Influenza Vaccine Quad Inj (Pf)    2019  Imm Admin: Influenza, Unspecified - HISTORICAL DATA    10/03/2018  Imm Admin: Influenza Vaccine Adult HD    10/24/2017  Imm Admin: Influenza Vaccine Adult HD    Only the first 5 history entries have been loaded, but more history exists.          COVID-19 Vaccine (Series Information) Completed    2021  Imm Admin: Pfizer SARS-CoV-2 Vaccine 12+    2021  Imm Admin: Pfizer SARS-CoV-2 Vaccine    2021  Imm Admin: Pfizer SARS-CoV-2 Vaccine          Discontinued - COLORECTAL CANCER SCREENING  Discontinued    2008  COLONOSCOPY (Done)                Patient Care Team:  Shaquille Grubbs M.D. as PCP - General (Internal Medicine)  Jaret Saunders M.D. (Anesthesiology)  [facility] (DME Supplier)    Social History     Tobacco Use   • Smoking status: Former Smoker     Packs/day: 1.00     Years: 39.00     Pack years: 39.00     Types: Cigarettes     Quit date: 2000     Years since quittin.2   • Smokeless tobacco: Never Used   • Tobacco  comment: started smoking at age 16   Vaping Use   • Vaping Use: Never used   Substance Use Topics   • Alcohol use: No   • Drug use: No     Family History   Problem Relation Age of Onset   • Cancer Father         Esophogial/smoker   • No Known Problems Mother    • No Known Problems Brother    • No Known Problems Son    • No Known Problems Daughter    • No Known Problems Daughter    • No Known Problems Maternal Grandmother    • No Known Problems Maternal Grandfather    • No Known Problems Paternal Grandmother    • No Known Problems Paternal Grandfather    • Sleep Apnea Neg Hx      He  has a past medical history of Anesthesia, Apnea, sleep, Arthritis, Asthma, Chickenpox, Cold (01/2019), Daytime sleepiness, Dental disorder, Emphysema of lung (HCC), Gasping for breath, [place] measles, Heart burn, Hypertension, Pain, Pneumonia (2018), Psychiatric problem, Shingles (03/01/2018), Shingles (05/01/2018), and Snoring.   Past Surgical History:   Procedure Laterality Date   • LUMBAR FUSION O-ARM  10/9/2019    Procedure: FUSION, SPINE, LUMBAR, WITH O-ARM IMAGING GUIDANCE- L4-5 TLIF;  Surgeon: Nikolas Dooley M.D.;  Location: [facility];  Service: Neurosurgery   • LUMBAR LAMINECTOMY DISKECTOMY  10/9/2019    Procedure: LAMINECTOMY, SPINE, LUMBAR, WITH DISCECTOMY- REDO L3-S1 LAMI;  Surgeon: Nikolas Dooley M.D.;  Location: [facility];  Service: Neurosurgery   • LUMBAR DECOMPRESSION  10/9/2019    Procedure: DECOMPRESSION, SPINE, LUMBAR- RIGHT L5 TRANSPEDICULAR;  Surgeon: Nikolas Dooley M.D.;  Location: [facility];  Service: Neurosurgery   • LUMBAR LAMINECTOMY DISKECTOMY  3/14/2019    Procedure: LUMBAR LAMINECTOMY DISKECTOMY- L4-5, MEDIAL FACETECTOMY;  Surgeon: Edmond Fung M.D.;  Location: [facility];  Service: Neurosurgery   • FORAMINOTOMY Bilateral 3/14/2019    Procedure: FORAMINOTOMY;  Surgeon: Edmond Fung M.D.;  Location: [facility];  Service:  "Neurosurgery   • OTHER      T&A   • TONSILLECTOMY             Exam:     /64   Pulse 83   Temp 37.1 °C (98.7 °F) (Temporal)   Ht 1.778 m (5' 10\")   Wt 82.4 kg (181 lb 9.6 oz)   SpO2 95%  Body mass index is 26.06 kg/m².    Hearing good.    Dentition poor  Alert, oriented in no acute distress.  Eye contact is good, speech goal directed, affect calm      Assessment and Plan. The following treatment and monitoring plan is recommended:    1. COPD mixed type (HCC)     2. Primary insomnia     Prescription for Ronan tropia sent to start LAMA for optimizing COPD treatment  Increase trazodone to 200 mg, patient cautioned on side effects.      Services suggested: No services needed at this time  Health Care Screening recommendations as per orders if indicated.  Referrals offered: PT/OT/Nutrition counseling/Behavioral Health/Smoking cessation as per orders if indicated.    Discussion today about general wellness and lifestyle habits:    · Prevent falls and reduce trip hazards; Cautioned about securing or removing rugs.  · Have a working fire alarm and carbon monoxide detector;   · Engage in regular physical activity and social activities       Follow-up: Return in about 3 months (around 4/27/2022).  "

## 2022-02-03 ENCOUNTER — HOSPITAL ENCOUNTER (OUTPATIENT)
Dept: RADIOLOGY | Facility: MEDICAL CENTER | Age: 77
End: 2022-02-03
Attending: NURSE PRACTITIONER
Payer: MEDICARE

## 2022-02-03 DIAGNOSIS — M54.16 LUMBAR RADICULOPATHY: ICD-10-CM

## 2022-02-08 ENCOUNTER — HOSPITAL ENCOUNTER (OUTPATIENT)
Dept: RADIOLOGY | Facility: MEDICAL CENTER | Age: 77
End: 2022-02-08
Attending: NURSE PRACTITIONER
Payer: MEDICARE

## 2022-02-08 DIAGNOSIS — M54.16 LUMBAR RADICULOPATHY: ICD-10-CM

## 2022-02-08 PROCEDURE — 72148 MRI LUMBAR SPINE W/O DYE: CPT | Mod: MH

## 2022-03-01 ENCOUNTER — OFFICE VISIT (OUTPATIENT)
Dept: SLEEP MEDICINE | Facility: MEDICAL CENTER | Age: 77
End: 2022-03-01
Payer: MEDICARE

## 2022-03-01 VITALS
WEIGHT: 178.3 LBS | HEIGHT: 70 IN | OXYGEN SATURATION: 95 % | BODY MASS INDEX: 25.53 KG/M2 | DIASTOLIC BLOOD PRESSURE: 80 MMHG | RESPIRATION RATE: 18 BRPM | HEART RATE: 89 BPM | SYSTOLIC BLOOD PRESSURE: 146 MMHG

## 2022-03-01 DIAGNOSIS — G47.31 COMPLEX SLEEP APNEA SYNDROME: ICD-10-CM

## 2022-03-01 PROCEDURE — 99213 OFFICE O/P EST LOW 20 MIN: CPT | Performed by: NURSE PRACTITIONER

## 2022-03-01 ASSESSMENT — FIBROSIS 4 INDEX: FIB4 SCORE: 0.79

## 2022-03-01 NOTE — PROGRESS NOTES
Chief Complaint   Patient presents with   • Follow-Up     BRANDEE       HPI:  Bayron Parikh is a 76 y.o. year old male here today for follow-up on complex sleep apnea. PMH includes post herpatic neuralgia, shingles, HTN, psychiatric problems, eczema, ED, COPD (managed by PCP), former smoker w a 39 pack year history, quit 11/1/2000, hypoxia, arthritis, tonsillectomy, snoring, daytime sleepiness, and insomnia. He follows up with Dr. Saunders for chronic low back pain for which he takes oxycodone 10/325 mg qid prn and zanaflex 4 mg qhs prn. He also takes gabapentin 300 mg 3 tabs tid for post herpetic neuralgia.  Last seen 1/4/2022 by me.     At a previous visit, he was on auto CPAP at 10 to 16 cm/H2O with 2 L supplemental oxygen bleed in.  AHI on previous compliance was 10.3.  They increased auto CPAP to 13 to 17 cm/H2O due to these concerns.  compliance at previous visit showed an AHI of 9.7.  At previous visit I adjusted the pressures to 13 to 20 cm/H2O.    Current 30-day compliance shows 90% use with an average time of 5 hours and 38 minutes and a resultant AHI of 6.9 there is no evidence of excessive leakage on compliance.  Median pressure is 14.6 with a 95th percentile of 17.7 cm/H2O.    Patient denies any difficulty tolerating mask or pressures, and denies any mask leakage at this time.  He does state that he does know when he has elevated events per hour as he is unable to sleep.  This most always results in a morning headache for this patient.    Sleep Study History:   PSG split night study from 1/26/21 indicated severe complex sleep apnea with an AHI of 49.3.  Central apnea was evident at 24% during the diagnostic portion and 36% during the treatment portion of the study. Patient spent 109.1 minutes below 89% O2.  CPAP was started at a pressure of 4 cm H2O and titrated as high as 14 cm H2O. At 13 cmH2O the AHI was 0.0, min SpO2 was 89%, max was 93%. Recommend ACPAP 13 cm with Airfit F20 mask. Consider  supplemental O2 bleed in.      Multiox from 1/19/21 indicated he dropped from the 90s down to 86 with exertion. Therefore, O2 2 L a minute as needed for exercise was prescribed.     ROS: As per HPI and otherwise negative if not stated.    Past Medical History:   Diagnosis Date   • Anesthesia    • Apnea, sleep    • Arthritis    • Asthma     daily inhaler   • Chickenpox    • Cold 01/2019   • Daytime sleepiness    • Dental disorder     full dentures   • Emphysema of lung (HCC)    • Gasping for breath    • [place]yr[place] measles    • Heart burn    • Hypertension    • Pain     back   • Pneumonia 2018   • Psychiatric problem     anxiety   • Shingles 03/01/2018   • Shingles 05/01/2018   • Snoring        Past Surgical History:   Procedure Laterality Date   • LUMBAR FUSION O-ARM  10/9/2019    Procedure: FUSION, SPINE, LUMBAR, WITH O-ARM IMAGING GUIDANCE- L4-5 TLIF;  Surgeon: Nikolas Dooley M.D.;  Location: [facility];  Service: Neurosurgery   • LUMBAR LAMINECTOMY DISKECTOMY  10/9/2019    Procedure: LAMINECTOMY, SPINE, LUMBAR, WITH DISCECTOMY- REDO L3-S1 LAMI;  Surgeon: Nikolas Dooley M.D.;  Location: [facility];  Service: Neurosurgery   • LUMBAR DECOMPRESSION  10/9/2019    Procedure: DECOMPRESSION, SPINE, LUMBAR- RIGHT L5 TRANSPEDICULAR;  Surgeon: Nikolas Dooley M.D.;  Location: [facility];  Service: Neurosurgery   • LUMBAR LAMINECTOMY DISKECTOMY  3/14/2019    Procedure: LUMBAR LAMINECTOMY DISKECTOMY- L4-5, MEDIAL FACETECTOMY;  Surgeon: Edmond Fung M.D.;  Location: [facility];  Service: Neurosurgery   • FORAMINOTOMY Bilateral 3/14/2019    Procedure: FORAMINOTOMY;  Surgeon: Edmond Fung M.D.;  Location: [facility];  Service: Neurosurgery   • OTHER      T&A   • TONSILLECTOMY         Family History   Problem Relation Age of Onset   • Cancer Father         Esophogial/smoker   • No Known Problems Mother    • No Known Problems Brother    • No Known Problems Son     • No Known Problems Daughter    • No Known Problems Daughter    • No Known Problems Maternal Grandmother    • No Known Problems Maternal Grandfather    • No Known Problems Paternal Grandmother    • No Known Problems Paternal Grandfather    • Sleep Apnea Neg Hx        Allergies as of 03/01/2022   • (No Known Allergies)        Vitals:  There were no vitals taken for this visit.    Current medications as of today   Current Outpatient Medications   Medication Sig Dispense Refill   • traZODone (DESYREL) 100 MG Tab Take 2 Tablets by mouth every evening. 180 Tablet 3   • Tiotropium Bromide Monohydrate 1.25 MCG/ACT Aero Soln Inhale 1 Puff every day. 1 Each 3   • fluticasone-salmeterol (WIXELA INHUB) 100-50 MCG/DOSE AEROSOL POWDER, BREATH ACTIVATED Inhale 1 Puff every 12 hours. 1 Each 6   • albuterol 108 (90 Base) MCG/ACT Aero Soln inhalation aerosol Inhale 2 Puffs every 6 hours as needed. 1 Each 11   • triamcinolone acetonide (KENALOG) 0.1 % Ointment APPLY TO AFFECTED AREA TWICE A DAY 15 g 0   • Misc. Devices Misc Overnight pulse oximetry to check for apnea episodes and low oxygen. 1 Each 11   • ASPIRIN 81 PO Take  by mouth.     • oxyCODONE-acetaminophen (PERCOCET-10)  MG Tab Take 1 Tab by mouth every four hours as needed for Severe Pain.     • tizanidine (ZANAFLEX) 4 MG Tab Take 1 Tab by mouth every 6 hours as needed. 30 Tab 0     No current facility-administered medications for this visit.         Physical Exam:   Gen:           Alert and oriented, No apparent distress. Mood and affect appropriate, normal interaction with examiner.  Eyes:          PERRL, EOM intact, sclere white, conjunctive moist.  Ears:          Not examined.   Hearing:     Grossly intact.  Nose:          Normal, no lesions or deformities.  Dentition:    Good dentition.  Oropharynx:   Tongue normal, posterior pharynx without erythema or exudate.  Neck:        Supple, trachea midline, no masses.  Respiratory Effort: No intercostal retractions or  use of accessory muscles.   Lung Auscultation:      Clear to auscultation bilaterally; no rales, rhonchi or wheezing.  CV:            Regular rate and rhythm. No murmurs, rubs or gallops.  Abd:           Not examined.   Lymphadenopathy: Not examined.  Gait and Station: Normal.  Digits and Nails: No clubbing, cyanosis, petechiae, or nodes.   Cranial Nerves: II-XII grossly intact.  Skin:        No rashes, lesions or ulcers noted.               Ext:           No cyanosis or edema.      Assessment:  1. Complex sleep apnea syndrome         Plan:  1.  Reviewed updated compliance after pressure adjustment was made.  Current AHI is 6.9 which is better than it has been in the past.  Pressure is currently maxed out at auto settings of 13 to 20 cm/H2O.  Patient denies any difficulty with mask fit at this time or any pressure leaks.  I am placing referral to 8 ENT for further evaluation.  Order also placed for mask and supplies which will be good for 1 year.  Patient will seek out ENT referral and then follow-up approximately 6 months from now for reevaluation.  Patient continues to use and benefit from CPAP therapy at this time.    Please note that this dictation was created using voice recognition software. I have made every reasonable attempt to correct obvious errors, but it is possible there are errors of grammar and possibly content that I did not discover before finalizing the note.

## 2022-04-07 ENCOUNTER — PRE-ADMISSION TESTING (OUTPATIENT)
Dept: ADMISSIONS | Facility: MEDICAL CENTER | Age: 77
End: 2022-04-07
Attending: NEUROLOGICAL SURGERY
Payer: MEDICARE

## 2022-04-07 ENCOUNTER — HOSPITAL ENCOUNTER (OUTPATIENT)
Dept: RADIOLOGY | Facility: MEDICAL CENTER | Age: 77
End: 2022-04-07
Attending: NEUROLOGICAL SURGERY
Payer: MEDICARE

## 2022-04-07 DIAGNOSIS — Z01.811 PRE-OPERATIVE RESPIRATORY EXAMINATION: ICD-10-CM

## 2022-04-07 DIAGNOSIS — Z01.810 PRE-OPERATIVE CARDIOVASCULAR EXAMINATION: ICD-10-CM

## 2022-04-07 DIAGNOSIS — Z01.812 PRE-OPERATIVE LABORATORY EXAMINATION: ICD-10-CM

## 2022-04-07 LAB
ANION GAP SERPL CALC-SCNC: 11 MMOL/L (ref 7–16)
APTT PPP: 27.9 SEC (ref 24.7–36)
BASOPHILS # BLD AUTO: 1.8 % (ref 0–1.8)
BASOPHILS # BLD: 0.16 K/UL (ref 0–0.12)
BUN SERPL-MCNC: 19 MG/DL (ref 8–22)
CALCIUM SERPL-MCNC: 10.2 MG/DL (ref 8.5–10.5)
CHLORIDE SERPL-SCNC: 104 MMOL/L (ref 96–112)
CO2 SERPL-SCNC: 25 MMOL/L (ref 20–33)
CREAT SERPL-MCNC: 0.81 MG/DL (ref 0.5–1.4)
EKG IMPRESSION: NORMAL
EOSINOPHIL # BLD AUTO: 0.29 K/UL (ref 0–0.51)
EOSINOPHIL NFR BLD: 3.2 % (ref 0–6.9)
ERYTHROCYTE [DISTWIDTH] IN BLOOD BY AUTOMATED COUNT: 45.7 FL (ref 35.9–50)
GFR SERPLBLD CREATININE-BSD FMLA CKD-EPI: 90 ML/MIN/1.73 M 2
GLUCOSE SERPL-MCNC: 95 MG/DL (ref 65–99)
HCT VFR BLD AUTO: 43.7 % (ref 42–52)
HGB BLD-MCNC: 14.4 G/DL (ref 14–18)
IMM GRANULOCYTES # BLD AUTO: 0.02 K/UL (ref 0–0.11)
IMM GRANULOCYTES NFR BLD AUTO: 0.2 % (ref 0–0.9)
INR PPP: 1.03 (ref 0.87–1.13)
LYMPHOCYTES # BLD AUTO: 2.81 K/UL (ref 1–4.8)
LYMPHOCYTES NFR BLD: 30.9 % (ref 22–41)
MCH RBC QN AUTO: 31.4 PG (ref 27–33)
MCHC RBC AUTO-ENTMCNC: 33 G/DL (ref 33.7–35.3)
MCV RBC AUTO: 95.4 FL (ref 81.4–97.8)
MONOCYTES # BLD AUTO: 0.8 K/UL (ref 0–0.85)
MONOCYTES NFR BLD AUTO: 8.8 % (ref 0–13.4)
NEUTROPHILS # BLD AUTO: 5 K/UL (ref 1.82–7.42)
NEUTROPHILS NFR BLD: 55.1 % (ref 44–72)
NRBC # BLD AUTO: 0 K/UL
NRBC BLD-RTO: 0 /100 WBC
PLATELET # BLD AUTO: 245 K/UL (ref 164–446)
PMV BLD AUTO: 11.2 FL (ref 9–12.9)
POTASSIUM SERPL-SCNC: 4.4 MMOL/L (ref 3.6–5.5)
PROTHROMBIN TIME: 13.2 SEC (ref 12–14.6)
RBC # BLD AUTO: 4.58 M/UL (ref 4.7–6.1)
SODIUM SERPL-SCNC: 140 MMOL/L (ref 135–145)
WBC # BLD AUTO: 9.1 K/UL (ref 4.8–10.8)

## 2022-04-07 PROCEDURE — 85730 THROMBOPLASTIN TIME PARTIAL: CPT

## 2022-04-07 PROCEDURE — 85610 PROTHROMBIN TIME: CPT

## 2022-04-07 PROCEDURE — 93005 ELECTROCARDIOGRAM TRACING: CPT

## 2022-04-07 PROCEDURE — 80048 BASIC METABOLIC PNL TOTAL CA: CPT

## 2022-04-07 PROCEDURE — 71046 X-RAY EXAM CHEST 2 VIEWS: CPT

## 2022-04-07 PROCEDURE — 93010 ELECTROCARDIOGRAM REPORT: CPT | Performed by: INTERNAL MEDICINE

## 2022-04-07 PROCEDURE — 85025 COMPLETE CBC W/AUTO DIFF WBC: CPT

## 2022-04-07 PROCEDURE — 36415 COLL VENOUS BLD VENIPUNCTURE: CPT

## 2022-04-07 ASSESSMENT — FIBROSIS 4 INDEX: FIB4 SCORE: 0.79

## 2022-04-14 ENCOUNTER — APPOINTMENT (OUTPATIENT)
Dept: RADIOLOGY | Facility: MEDICAL CENTER | Age: 77
End: 2022-04-14
Attending: NEUROLOGICAL SURGERY
Payer: MEDICARE

## 2022-04-14 ENCOUNTER — ANESTHESIA (OUTPATIENT)
Dept: SURGERY | Facility: MEDICAL CENTER | Age: 77
End: 2022-04-14
Payer: MEDICARE

## 2022-04-14 ENCOUNTER — ANESTHESIA EVENT (OUTPATIENT)
Dept: SURGERY | Facility: MEDICAL CENTER | Age: 77
End: 2022-04-14
Payer: MEDICARE

## 2022-04-14 ENCOUNTER — HOSPITAL ENCOUNTER (OUTPATIENT)
Facility: MEDICAL CENTER | Age: 77
End: 2022-04-14
Attending: NEUROLOGICAL SURGERY | Admitting: NEUROLOGICAL SURGERY
Payer: MEDICARE

## 2022-04-14 VITALS
HEIGHT: 70 IN | SYSTOLIC BLOOD PRESSURE: 103 MMHG | WEIGHT: 172.4 LBS | TEMPERATURE: 97.4 F | OXYGEN SATURATION: 92 % | HEART RATE: 90 BPM | BODY MASS INDEX: 24.68 KG/M2 | RESPIRATION RATE: 16 BRPM | DIASTOLIC BLOOD PRESSURE: 70 MMHG

## 2022-04-14 DIAGNOSIS — M48.061 SPINAL STENOSIS OF LUMBAR REGION, UNSPECIFIED WHETHER NEUROGENIC CLAUDICATION PRESENT: ICD-10-CM

## 2022-04-14 PROCEDURE — 700105 HCHG RX REV CODE 258: Performed by: NEUROLOGICAL SURGERY

## 2022-04-14 PROCEDURE — 700111 HCHG RX REV CODE 636 W/ 250 OVERRIDE (IP): Performed by: NEUROLOGICAL SURGERY

## 2022-04-14 PROCEDURE — 00630 ANES PX LUMBAR REGION NOS: CPT | Performed by: ANESTHESIOLOGY

## 2022-04-14 PROCEDURE — 160029 HCHG SURGERY MINUTES - 1ST 30 MINS LEVEL 4: Performed by: NEUROLOGICAL SURGERY

## 2022-04-14 PROCEDURE — 700111 HCHG RX REV CODE 636 W/ 250 OVERRIDE (IP): Performed by: ANESTHESIOLOGY

## 2022-04-14 PROCEDURE — 160041 HCHG SURGERY MINUTES - EA ADDL 1 MIN LEVEL 4: Performed by: NEUROLOGICAL SURGERY

## 2022-04-14 PROCEDURE — 700101 HCHG RX REV CODE 250: Performed by: NEUROLOGICAL SURGERY

## 2022-04-14 PROCEDURE — 700101 HCHG RX REV CODE 250: Performed by: ANESTHESIOLOGY

## 2022-04-14 PROCEDURE — 160009 HCHG ANES TIME/MIN: Performed by: NEUROLOGICAL SURGERY

## 2022-04-14 PROCEDURE — 110454 HCHG SHELL REV 250: Performed by: NEUROLOGICAL SURGERY

## 2022-04-14 PROCEDURE — 160047 HCHG PACU  - EA ADDL 30 MINS PHASE II: Performed by: NEUROLOGICAL SURGERY

## 2022-04-14 PROCEDURE — 160002 HCHG RECOVERY MINUTES (STAT): Performed by: NEUROLOGICAL SURGERY

## 2022-04-14 PROCEDURE — 500367 HCHG DRAIN KIT, HEMOVAC: Performed by: NEUROLOGICAL SURGERY

## 2022-04-14 PROCEDURE — 99100 ANES PT EXTEME AGE<1 YR&>70: CPT | Performed by: ANESTHESIOLOGY

## 2022-04-14 PROCEDURE — 160025 RECOVERY II MINUTES (STATS): Performed by: NEUROLOGICAL SURGERY

## 2022-04-14 PROCEDURE — 700102 HCHG RX REV CODE 250 W/ 637 OVERRIDE(OP): Performed by: ANESTHESIOLOGY

## 2022-04-14 PROCEDURE — 160048 HCHG OR STATISTICAL LEVEL 1-5: Performed by: NEUROLOGICAL SURGERY

## 2022-04-14 PROCEDURE — 160046 HCHG PACU - 1ST 60 MINS PHASE II: Performed by: NEUROLOGICAL SURGERY

## 2022-04-14 PROCEDURE — A9270 NON-COVERED ITEM OR SERVICE: HCPCS | Performed by: ANESTHESIOLOGY

## 2022-04-14 PROCEDURE — 72020 X-RAY EXAM OF SPINE 1 VIEW: CPT

## 2022-04-14 PROCEDURE — 160035 HCHG PACU - 1ST 60 MINS PHASE I: Performed by: NEUROLOGICAL SURGERY

## 2022-04-14 PROCEDURE — 501838 HCHG SUTURE GENERAL: Performed by: NEUROLOGICAL SURGERY

## 2022-04-14 PROCEDURE — 160036 HCHG PACU - EA ADDL 30 MINS PHASE I: Performed by: NEUROLOGICAL SURGERY

## 2022-04-14 RX ORDER — MEPERIDINE HYDROCHLORIDE 25 MG/ML
25 INJECTION INTRAMUSCULAR; INTRAVENOUS; SUBCUTANEOUS
Status: DISCONTINUED | OUTPATIENT
Start: 2022-04-14 | End: 2022-04-14 | Stop reason: HOSPADM

## 2022-04-14 RX ORDER — HYDROMORPHONE HYDROCHLORIDE 1 MG/ML
0.1 INJECTION, SOLUTION INTRAMUSCULAR; INTRAVENOUS; SUBCUTANEOUS
Status: DISCONTINUED | OUTPATIENT
Start: 2022-04-14 | End: 2022-04-14 | Stop reason: HOSPADM

## 2022-04-14 RX ORDER — OXYCODONE HCL 5 MG/5 ML
5 SOLUTION, ORAL ORAL
Status: COMPLETED | OUTPATIENT
Start: 2022-04-14 | End: 2022-04-14

## 2022-04-14 RX ORDER — HYDROMORPHONE HYDROCHLORIDE 1 MG/ML
0.5 INJECTION, SOLUTION INTRAMUSCULAR; INTRAVENOUS; SUBCUTANEOUS
Status: DISCONTINUED | OUTPATIENT
Start: 2022-04-14 | End: 2022-04-14 | Stop reason: HOSPADM

## 2022-04-14 RX ORDER — BUPIVACAINE HYDROCHLORIDE AND EPINEPHRINE 5; 5 MG/ML; UG/ML
INJECTION, SOLUTION PERINEURAL
Status: DISCONTINUED | OUTPATIENT
Start: 2022-04-14 | End: 2022-04-14 | Stop reason: HOSPADM

## 2022-04-14 RX ORDER — IPRATROPIUM BROMIDE AND ALBUTEROL SULFATE 2.5; .5 MG/3ML; MG/3ML
3 SOLUTION RESPIRATORY (INHALATION)
Status: DISCONTINUED | OUTPATIENT
Start: 2022-04-14 | End: 2022-04-14 | Stop reason: HOSPADM

## 2022-04-14 RX ORDER — OXYCODONE HCL 5 MG/5 ML
10 SOLUTION, ORAL ORAL
Status: COMPLETED | OUTPATIENT
Start: 2022-04-14 | End: 2022-04-14

## 2022-04-14 RX ORDER — CEFAZOLIN SODIUM 1 G/3ML
INJECTION, POWDER, FOR SOLUTION INTRAMUSCULAR; INTRAVENOUS
Status: DISCONTINUED | OUTPATIENT
Start: 2022-04-14 | End: 2022-04-14 | Stop reason: HOSPADM

## 2022-04-14 RX ORDER — ONDANSETRON 2 MG/ML
4 INJECTION INTRAMUSCULAR; INTRAVENOUS
Status: DISCONTINUED | OUTPATIENT
Start: 2022-04-14 | End: 2022-04-14 | Stop reason: HOSPADM

## 2022-04-14 RX ORDER — CEFAZOLIN SODIUM 1 G/3ML
INJECTION, POWDER, FOR SOLUTION INTRAMUSCULAR; INTRAVENOUS PRN
Status: DISCONTINUED | OUTPATIENT
Start: 2022-04-14 | End: 2022-04-14 | Stop reason: SURG

## 2022-04-14 RX ORDER — HYDROMORPHONE HYDROCHLORIDE 1 MG/ML
0.2 INJECTION, SOLUTION INTRAMUSCULAR; INTRAVENOUS; SUBCUTANEOUS
Status: DISCONTINUED | OUTPATIENT
Start: 2022-04-14 | End: 2022-04-14 | Stop reason: HOSPADM

## 2022-04-14 RX ORDER — ONDANSETRON 2 MG/ML
INJECTION INTRAMUSCULAR; INTRAVENOUS PRN
Status: DISCONTINUED | OUTPATIENT
Start: 2022-04-14 | End: 2022-04-14 | Stop reason: SURG

## 2022-04-14 RX ORDER — DIPHENHYDRAMINE HYDROCHLORIDE 50 MG/ML
12.5 INJECTION INTRAMUSCULAR; INTRAVENOUS
Status: DISCONTINUED | OUTPATIENT
Start: 2022-04-14 | End: 2022-04-14 | Stop reason: HOSPADM

## 2022-04-14 RX ORDER — SODIUM CHLORIDE, SODIUM LACTATE, POTASSIUM CHLORIDE, CALCIUM CHLORIDE 600; 310; 30; 20 MG/100ML; MG/100ML; MG/100ML; MG/100ML
INJECTION, SOLUTION INTRAVENOUS CONTINUOUS
Status: DISCONTINUED | OUTPATIENT
Start: 2022-04-14 | End: 2022-04-14 | Stop reason: HOSPADM

## 2022-04-14 RX ORDER — DEXAMETHASONE SODIUM PHOSPHATE 4 MG/ML
INJECTION, SOLUTION INTRA-ARTICULAR; INTRALESIONAL; INTRAMUSCULAR; INTRAVENOUS; SOFT TISSUE PRN
Status: DISCONTINUED | OUTPATIENT
Start: 2022-04-14 | End: 2022-04-14 | Stop reason: SURG

## 2022-04-14 RX ORDER — SODIUM CHLORIDE, SODIUM LACTATE, POTASSIUM CHLORIDE, CALCIUM CHLORIDE 600; 310; 30; 20 MG/100ML; MG/100ML; MG/100ML; MG/100ML
INJECTION, SOLUTION INTRAVENOUS CONTINUOUS
Status: ACTIVE | OUTPATIENT
Start: 2022-04-14 | End: 2022-04-14

## 2022-04-14 RX ORDER — LIDOCAINE HYDROCHLORIDE 20 MG/ML
INJECTION, SOLUTION EPIDURAL; INFILTRATION; INTRACAUDAL; PERINEURAL PRN
Status: DISCONTINUED | OUTPATIENT
Start: 2022-04-14 | End: 2022-04-14 | Stop reason: SURG

## 2022-04-14 RX ORDER — MIDAZOLAM HYDROCHLORIDE 1 MG/ML
1 INJECTION INTRAMUSCULAR; INTRAVENOUS
Status: DISCONTINUED | OUTPATIENT
Start: 2022-04-14 | End: 2022-04-14 | Stop reason: HOSPADM

## 2022-04-14 RX ORDER — KETOROLAC TROMETHAMINE 30 MG/ML
INJECTION, SOLUTION INTRAMUSCULAR; INTRAVENOUS PRN
Status: DISCONTINUED | OUTPATIENT
Start: 2022-04-14 | End: 2022-04-14 | Stop reason: SURG

## 2022-04-14 RX ADMIN — FENTANYL CITRATE 50 MCG: 50 INJECTION, SOLUTION INTRAMUSCULAR; INTRAVENOUS at 14:24

## 2022-04-14 RX ADMIN — LIDOCAINE HYDROCHLORIDE 50 MG: 20 INJECTION, SOLUTION EPIDURAL; INFILTRATION; INTRACAUDAL at 12:24

## 2022-04-14 RX ADMIN — HYDROMORPHONE HYDROCHLORIDE 0.2 MG: 1 INJECTION, SOLUTION INTRAMUSCULAR; INTRAVENOUS; SUBCUTANEOUS at 14:28

## 2022-04-14 RX ADMIN — MIDAZOLAM 2 MG: 1 INJECTION INTRAMUSCULAR; INTRAVENOUS at 12:20

## 2022-04-14 RX ADMIN — ROCURONIUM BROMIDE 10 MG: 10 INJECTION, SOLUTION INTRAVENOUS at 13:49

## 2022-04-14 RX ADMIN — ROCURONIUM BROMIDE 50 MG: 10 INJECTION, SOLUTION INTRAVENOUS at 12:24

## 2022-04-14 RX ADMIN — PROPOFOL 150 MG: 10 INJECTION, EMULSION INTRAVENOUS at 12:24

## 2022-04-14 RX ADMIN — KETOROLAC TROMETHAMINE 30 MG: 30 INJECTION, SOLUTION INTRAMUSCULAR at 12:44

## 2022-04-14 RX ADMIN — FENTANYL CITRATE 50 MCG: 50 INJECTION, SOLUTION INTRAMUSCULAR; INTRAVENOUS at 14:20

## 2022-04-14 RX ADMIN — CEFAZOLIN 2 G: 330 INJECTION, POWDER, FOR SOLUTION INTRAMUSCULAR; INTRAVENOUS at 12:20

## 2022-04-14 RX ADMIN — HYDROMORPHONE HYDROCHLORIDE 0.2 MG: 1 INJECTION, SOLUTION INTRAMUSCULAR; INTRAVENOUS; SUBCUTANEOUS at 14:43

## 2022-04-14 RX ADMIN — FENTANYL CITRATE 50 MCG: 50 INJECTION, SOLUTION INTRAMUSCULAR; INTRAVENOUS at 12:24

## 2022-04-14 RX ADMIN — SUGAMMADEX 200 MG: 100 INJECTION, SOLUTION INTRAVENOUS at 14:04

## 2022-04-14 RX ADMIN — SODIUM CHLORIDE, POTASSIUM CHLORIDE, SODIUM LACTATE AND CALCIUM CHLORIDE: 600; 310; 30; 20 INJECTION, SOLUTION INTRAVENOUS at 11:29

## 2022-04-14 RX ADMIN — DEXAMETHASONE SODIUM PHOSPHATE 8 MG: 4 INJECTION, SOLUTION INTRA-ARTICULAR; INTRALESIONAL; INTRAMUSCULAR; INTRAVENOUS; SOFT TISSUE at 12:38

## 2022-04-14 RX ADMIN — FENTANYL CITRATE 50 MCG: 50 INJECTION, SOLUTION INTRAMUSCULAR; INTRAVENOUS at 13:26

## 2022-04-14 RX ADMIN — HYDROMORPHONE HYDROCHLORIDE 0.2 MG: 1 INJECTION, SOLUTION INTRAMUSCULAR; INTRAVENOUS; SUBCUTANEOUS at 14:33

## 2022-04-14 RX ADMIN — OXYCODONE HYDROCHLORIDE 10 MG: 5 SOLUTION ORAL at 14:27

## 2022-04-14 RX ADMIN — ONDANSETRON 4 MG: 2 INJECTION INTRAMUSCULAR; INTRAVENOUS at 12:38

## 2022-04-14 RX ADMIN — LIDOCAINE HYDROCHLORIDE 0.5 ML: 10 INJECTION, SOLUTION EPIDURAL; INFILTRATION; INTRACAUDAL at 11:30

## 2022-04-14 ASSESSMENT — PAIN DESCRIPTION - PAIN TYPE
TYPE: SURGICAL PAIN
TYPE: SURGICAL PAIN
TYPE: SURGICAL PAIN;CHRONIC PAIN
TYPE: SURGICAL PAIN
TYPE: CHRONIC PAIN

## 2022-04-14 ASSESSMENT — FIBROSIS 4 INDEX: FIB4 SCORE: 0.75

## 2022-04-14 ASSESSMENT — PAIN SCALES - GENERAL: PAIN_LEVEL: 6

## 2022-04-14 NOTE — OR NURSING
1415 PT arrived to PACU from OR. VSS. PT awake, reporting 7/10 pain, no nausea at this time. Will medicate per MAR. Dressing to midline back CDI. Hemovac in place.   1430 PT continues to report 7/10 pain, will medicate per MAR. VSS.   1445 Spouse updated on plan of care. VSS. Pt reporting 5/10 pain. VSS. Tolerating sips of water.   1515 Report called to phase 2 TRUONG Wynn. VSS. Hemovac emptied, output recorded. Pt states pain is manageable. No belongings with patient In pacu.

## 2022-04-14 NOTE — PROGRESS NOTES
Patient in pre-op, assessment completed, patient and wife ricardo updated on plan of care, all questions answered, no further needs at this time, call light within reach.

## 2022-04-14 NOTE — OR NURSING
1517 Arrived  From PACU Pt's VSS; denies N/V; states pain is at tolerable level. Dressing CDI to low back hemovac with scant bloody drainage.  Pt ambulated to the bathroom and voided large amount.    1600 TC to Dr Dooley in the OR. Pt walked ,voided and had 0 cc out from hemovac and wants to go home. Orders received for discharge.    1615 hemovac dc'd pt tolerated well.        1645 D/c orders received. IV dc'd. Pt changed into clothing with assistance. Pt up and ambulated to BR, steady gait, voided adequately. Discharge instructions given ; pt and family verbalized understanding and questions answered. Patient states ready to d/c home. Pt dc'd in w/c with CNA.

## 2022-04-14 NOTE — ANESTHESIA PREPROCEDURE EVALUATION
Case: [id number] Date/Time: 04/14/22 1301    Procedures:       LAMINECTOMY, SPINE, LUMBAR, WITH DISCECTOMY - L4 (Left )      DECOMPRESSION, SPINE, LUMBAR - POSTERIOR L3 TRANSPEDICULAR    Pre-op diagnosis: SPINAL STENOSIS OF LUMBAR REGION    Location: [facility] OR 04 / SURGERY [facility]    Surgeons: Nikolas Dooley M.D.          Relevant Problems   ANESTHESIA   (positive) BRANDEE (obstructive sleep apnea)      PULMONARY   (positive) COPD mixed type (HCC)       Physical Exam    Airway   Mallampati: II  TM distance: >3 FB  Neck ROM: full       Cardiovascular - normal exam  Rhythm: regular  Rate: normal  (-) murmur     Dental - normal exam           Pulmonary - normal exam  Breath sounds clear to auscultation     Abdominal    Neurological - normal exam                 Anesthesia Plan    ASA 3       Plan - general       Airway plan will be ETT          Induction: intravenous    Postoperative Plan: Postoperative administration of opioids is intended.    Pertinent diagnostic labs and testing reviewed    Informed Consent:    Anesthetic plan and risks discussed with patient.    Use of blood products discussed with: patient whom consented to blood products.

## 2022-04-14 NOTE — DISCHARGE INSTRUCTIONS
ACTIVITY: Rest and take it easy for the first 24 hours.  A responsible adult is recommended to remain with you during that time.  It is normal to feel sleepy.  We encourage you to not do anything that requires balance, judgment or coordination.    MILD FLU-LIKE SYMPTOMS ARE NORMAL. YOU MAY EXPERIENCE GENERALIZED MUSCLE ACHES, THROAT IRRITATION, HEADACHE AND/OR SOME NAUSEA.    FOR 24 HOURS DO NOT:  Drive, operate machinery or run household appliances.  Drink beer or alcoholic beverages.   Make important decisions or sign legal documents.    SPECIAL INSTRUCTIONS:   Activity as tolerated.    May remove surgical  dressing tomorrow at 2 PM   Once dressing off may shower at that time.   May remove drain bandaid 24 hours after removal at 4:30   Keep dressing open to air.   Ok to get incision wet, do not submerge in water until steristrips off.   Keep steristrips 7-10 days.   No NSAIDs for one week.   Follow up in four weeks.   Pericolace over the counter 2 by mouth daily while taking pain meds   Patient has meds at home- will increase to take 2 every 6 hours up to 8 in a day    DIET: To avoid nausea, slowly advance diet as tolerated, avoiding spicy or greasy foods for the first day.  Add more substantial food to your diet according to your physician's instructions.  INCREASE FLUIDS AND FIBER TO AVOID CONSTIPATION.    FOLLOW-UP APPOINTMENT:  A follow-up appointment should be arranged with your doctor in 1-2 weeks; call to schedule.    You should CALL YOUR PHYSICIAN if you develop:  Fever greater than 101 degrees F.  Pain not relieved by medication, or persistent nausea or vomiting.  Excessive bleeding (blood soaking through dressing) or unexpected drainage from the wound.  Extreme redness or swelling around the incision site, drainage of pus or foul smelling drainage.  Inability to urinate or empty your bladder within 8 hours.  Problems with breathing or chest pain.    You should call 911 if you develop problems with  breathing or chest pain.    If you are unable to contact your doctor or surgical center, you should go to the nearest emergency room or urgent care center.      Physician's telephone #: [phone] Dr. Dooley    If any questions arise, call your doctor.  If your doctor is not available, please feel free to call the Surgical Center at [phone].     A registered nurse may call you a few days after your surgery to see how you are doing after your procedure.    MEDICATIONS: Resume taking daily medication.  Take prescribed pain medication with food.  If no medication is prescribed, you may take non-aspirin pain medication if needed.  PAIN MEDICATION CAN BE VERY CONSTIPATING.  Take a stool softener or laxative such as senokot, pericolace, or milk of magnesia if needed.    Last pain medication given at 2:47 PM.    If your physician has prescribed pain medication that includes Acetaminophen (Tylenol), do not take additional Acetaminophen (Tylenol) while taking the prescribed medication.    Depression / Suicide Risk    As you are discharged from this [facility] facility, it is important to learn how to keep safe from harming yourself.    Recognize the warning signs:  · Abrupt changes in personality, positive or negative- including increase in energy   · Giving away possessions  · Change in eating patterns- significant weight changes-  positive or negative  · Change in sleeping patterns- unable to sleep or sleeping all the time   · Unwillingness or inability to communicate  · Depression  · Unusual sadness, discouragement and loneliness  · Talk of wanting to die  · Neglect of personal appearance   · Rebelliousness- reckless behavior  · Withdrawal from people/activities they love  · Confusion- inability to concentrate     If you or a loved one observes any of these behaviors or has concerns about self-harm, here's what you can do:  · Talk about it- your feelings and reasons for harming yourself  · Remove any means that  you might use to hurt yourself (examples: pills, rope, extension cords, firearm)  · Get professional help from the community (Mental Health, Substance Abuse, psychological counseling)  · Do not be alone:Call your Safe Contact- someone whom you trust who will be there for you.  · Call your local CRISIS HOTLINE 095-3022 or 254-938-9068  · Call your local Children's Mobile Crisis Response Team [place] (681) 747-1923 or www.[employer]  · Call the toll free National Suicide Prevention Hotlines   · National Suicide Prevention Lifeline 117-720-AAKX (1922)  · National Hope Line Network 800-SUICIDE (014-8301)

## 2022-04-14 NOTE — ANESTHESIA PROCEDURE NOTES
Airway    Date/Time: 4/14/2022 12:25 PM  Performed by: Sánchez Vaughn M.D.  Authorized by: Sánchez Vaughn M.D.     Location:  OR  Urgency:  Elective  Indications for Airway Management:  Anesthesia      Spontaneous Ventilation: absent    Sedation Level:  Deep  Preoxygenated: Yes    Patient Position:  Sniffing  Final Airway Type:  Endotracheal airway  Final Endotracheal Airway:  ETT  Cuffed: Yes    Technique Used for Successful ETT Placement:  Direct laryngoscopy    Insertion Site:  Oral  Blade Type:  Eugene  Laryngoscope Blade/Videolaryngoscope Blade Size:  3  ETT Size (mm):  8.0  Measured from:  Teeth  ETT to Teeth (cm):  24  Placement Verified by: auscultation and capnometry    Cormack-Lehane Classification:  Grade I - full view of glottis  Number of Attempts at Approach:  1

## 2022-04-14 NOTE — ANESTHESIA TIME REPORT
Anesthesia Start and Stop Event Times     Date Time Event    4/14/2022 1036 Ready for Procedure     1220 Anesthesia Start     1417 Anesthesia Stop        Responsible Staff  04/14/22    Name Role Begin End    Sánchez Vaughn M.D. Anesth 1220 1417        Overtime Reason:  no overtime (within assigned shift)    Comments:

## 2022-04-14 NOTE — OP REPORT
DATE OF SERVICE:  04/14/2022     PREOPERATIVE DIAGNOSIS:  Left L3 and L4 radiculopathy secondary to stenosis.     POSTOPERATIVE DIAGNOSIS:  Left L3 and L4 radiculopathy secondary to stenosis.     OPERATIONS:  1.  Microscopic left L3 transpedicular excision of lateral degenerated disk,   extensive decompression of distal left L3 nerve root.  2.  Left L4 laminotomy, medial facetectomy, decompression of left L4 nerve   root.     SURGEON:  Nikolas Dooley MD     ASSISTANT:  BULMARO Bravo     ANESTHESIA:  General endotracheal.     ANESTHESIOLOGIST:  Sánchez Vaughn MD     PREPARATION:  ChloraPrep.     MEDICATIONS:  The patient given Ancef prior to incision.     INDICATIONS:  This is a patient with radicular pain refractory to nonoperative   therapies.  The patient had stenosis affecting his 3 root in the foramen and   his 4 root in the lateral recess.  The patient was felt to be a candidate for   operative decompression for relief of radicular pain.  The patient understood   major risks and complications, paralysis exceedingly rare, biggest risk of   surgery is nonresponse, which is 10-15%, small risk of wound infection, spinal   fluid leak, chance to require another operation rest of his life 15%.  The   patient is understanding and agreed to proceed and signed consent.     NEED FOR SURGICAL ASSISTANT:  Surgical assistant required for retraction,   suction, irrigation, both under loupe and microscopic magnification, cleaning   of instruments, keep the case moving forward to minimize operative time.     DESCRIPTION OF PROCEDURE:  The patient was brought to the operating room.    Peripheral venous lines in place.  General anesthesia was induced.  The   patient intubated.  No Knutson catheter was placed.  The patient laid prone on   the OSI table using 6 posts, pressure points carefully padded.  The back was   doubly prepped by myself with ChloraPrep and draped.  Planned incision was   marked 3-4.  Skin was  infiltrated with local.  Using a subperiosteal   dissection, dissected out the spinous processes of inferior L2 through   superior L4.  The patient had previous 4-5 fusion.  I identified the pedicle   screw, levels also confirmed with intraoperative fluoroscopy.  Using Midas Juan Alberto   drill, inferior portion of lamina 3, the medial facet of 4 was drilled to   paper thin shelf of bone.  Working through scar tissue, I identified the 4   pedicle, completely removed the origin of the 4 root in the lateral recess and   could readily pass the nerve probe superiorly along the lateral recess around   the 4 pedicle without compromise.  Next, operating microscope was brought in   and a transpedicular L3 approach was carried out removing the lateral pars of   3, the tip of superior facet of 4.  Lateral ligamentum flavum was identified   and removed.  The 3 pedicle was identified and then the 3 root.  The patient   had a large degenerated disk, which was pancaking the 3 root up against the   inferior surface of the L3 pedicle.  I incised the lateral degenerated disk   with a 15 blade in a piecemeal fashion using curettes, the Midas Juan Alberto drill   itself and pituitary rongeurs.  This disk was removed from medial to lateral   pedicle where the nerve root was able to expand into the area that was   previously occupied by this degenerated disk.  Once that was accomplished in a   rather tedious fashion, the nerve root was well decompressed.  I could slide   the nerve probe medially underneath the pars interarticularis and in the   distal soft tissues without compromise.  Once decompression was completed,   microscope was withdrawn.  Wound was irrigated with antibiotic irrigation.  A   medium Hemovac drain was placed in depth of the wound, brought out through   separate stab incision.  Deep fascia was closed with 0 Vicryl, subcutaneous   fascia with 2-0 Vicryl, subcuticular closed with 3-0 Vicryl, and skin edges   approximated with  quarter-inch Steri-Strips.  The patient was laid supine on   the bed, extubated, taken to recovery room in satisfactory condition.     ESTIMATED BLOOD LOSS:  100 mL.        ______________________________  MD ANTONIO POWERS/JESI/[place]    DD:  04/14/2022 14:42  DT:  04/14/2022 15:59    Job#:  [id number]    CC:Sánchez Vaughn MD(User)

## 2022-04-14 NOTE — ANESTHESIA POSTPROCEDURE EVALUATION
Patient: Bayron Parikh    Procedure Summary     Date: 04/14/22 Room / Location: [facility] 04 / SURGERY [facility]    Anesthesia Start: 1220 Anesthesia Stop: 1417    Procedures:       LAMINECTOMY, SPINE, LUMBAR, WITH DISCECTOMY - L4 (Left )      DECOMPRESSION, SPINE, LUMBAR - POSTERIOR L3 TRANSPEDICULAR Diagnosis: (SPINAL STENOSIS OF LUMBAR REGION)    Surgeons: Nikolas Dooley M.D. Responsible Provider: Sánchez Vaughn M.D.    Anesthesia Type: general ASA Status: 3          Final Anesthesia Type: general  Last vitals  BP   Blood Pressure : 151/82    Temp   36.6 °C (97.9 °F)    Pulse   82   Resp   20    SpO2   96 %      Anesthesia Post Evaluation    Patient location during evaluation: PACU  Patient participation: complete - patient participated  Level of consciousness: awake and alert  Pain score: 6    Airway patency: patent  Anesthetic complications: no  Cardiovascular status: hemodynamically stable  Respiratory status: acceptable  Hydration status: euvolemic    PONV: none          No complications documented.     Nurse Pain Score: 7 (NPRS)

## 2022-08-04 ENCOUNTER — OFFICE VISIT (OUTPATIENT)
Dept: MEDICAL GROUP | Facility: PHYSICIAN GROUP | Age: 77
End: 2022-08-04
Payer: MEDICARE

## 2022-08-04 VITALS
RESPIRATION RATE: 16 BRPM | HEART RATE: 80 BPM | OXYGEN SATURATION: 97 % | WEIGHT: 168.8 LBS | HEIGHT: 70 IN | TEMPERATURE: 98.3 F | DIASTOLIC BLOOD PRESSURE: 80 MMHG | BODY MASS INDEX: 24.17 KG/M2 | SYSTOLIC BLOOD PRESSURE: 146 MMHG

## 2022-08-04 DIAGNOSIS — H61.23 IMPACTED CERUMEN, BILATERAL: ICD-10-CM

## 2022-08-04 DIAGNOSIS — Z76.89 ENCOUNTER TO ESTABLISH CARE: ICD-10-CM

## 2022-08-04 PROCEDURE — 99213 OFFICE O/P EST LOW 20 MIN: CPT | Performed by: STUDENT IN AN ORGANIZED HEALTH CARE EDUCATION/TRAINING PROGRAM

## 2022-08-04 RX ORDER — FLUTICASONE PROPIONATE 50 MCG
SPRAY, SUSPENSION (ML) NASAL
COMMUNITY
Start: 2022-07-28 | End: 2023-06-17

## 2022-08-04 ASSESSMENT — FIBROSIS 4 INDEX: FIB4 SCORE: 0.76

## 2022-08-04 NOTE — PROGRESS NOTES
Subjective:     CC:  Diagnoses of Encounter to establish care and Impacted cerumen, bilateral were pertinent to this visit.    HISTORY OF THE PRESENT ILLNESS: Patient is a 77 y.o. male. This pleasant patient is here today to establish care.    He does present with acute concern over bilateral ear fullness.  He recently went to get fitted with earplugs and was told that they could not do so because of impacted cerumen.    Active Diagnosis:    Patient Active Problem List   Diagnosis   • Chronic bilateral low back pain with left-sided sciatica   • Eczema   • Primary insomnia   • Erectile dysfunction   • Post herpetic neuralgia   • COPD mixed type (HCC)   • BRANDEE (obstructive sleep apnea)   • Former smoker   • Hypoxia   • Chronic pain syndrome   • Spinal stenosis, lumbar      Current Outpatient Medications Ordered in Epic   Medication Sig Dispense Refill   • fluticasone (FLONASE) 50 MCG/ACT nasal spray      • traZODone (DESYREL) 100 MG Tab Take 2 Tablets by mouth every evening. 180 Tablet 3   • Tiotropium Bromide Monohydrate 1.25 MCG/ACT Aero Soln Inhale 1 Puff every day. 1 Each 3   • fluticasone-salmeterol (WIXELA INHUB) 100-50 MCG/DOSE AEROSOL POWDER, BREATH ACTIVATED Inhale 1 Puff every 12 hours. 1 Each 6   • albuterol 108 (90 Base) MCG/ACT Aero Soln inhalation aerosol Inhale 2 Puffs every 6 hours as needed. 1 Each 11   • triamcinolone acetonide (KENALOG) 0.1 % Ointment APPLY TO AFFECTED AREA TWICE A DAY 15 g 0   • Misc. Devices Misc Overnight pulse oximetry to check for apnea episodes and low oxygen. 1 Each 11   • oxyCODONE-acetaminophen (PERCOCET-10)  MG Tab Take 1 Tab by mouth every four hours as needed for Severe Pain.     • tizanidine (ZANAFLEX) 4 MG Tab Take 1 Tab by mouth every 6 hours as needed. 30 Tab 0     No current Epic-ordered facility-administered medications on file.     ROS:   Gen: No fevers/chills, no changes in weight  HEENT: No changes in vision/hearing, sore throat.  Hearing at  "baseline.  Pulm: No cough, unexplained SOB.  CV: No chest pain/pressure, no palpitations  GI: No nausea/vomiting, no diarrhea  : No dysuria/nocturia  MSk: No myalgias  Skin: No rash/skin changes  Neuro: No headaches, no numbness/tingling  Heme/Lymph: no easy bruising      Objective:     Exam: BP (!) 146/80 (BP Location: Left arm, Patient Position: Sitting, BP Cuff Size: Adult)   Pulse 80   Temp 36.8 °C (98.3 °F) (Temporal)   Resp 16   Ht 1.778 m (5' 10\")   Wt 76.6 kg (168 lb 12.8 oz)   SpO2 97%  Body mass index is 24.22 kg/m².    General: Normal appearing. No distress.  HEENT: Normocephalic. Eyes conjunctiva clear lids without ptosis, pupils equal and reactive to light accommodation. Ears normal shape and contour, canals are clear bilaterally after lavage, tympanic membranes are benign, nasal mucosa benign, oropharynx is without erythema, edema or exudates.   Neck: Supple without JVD. Thyroid is not enlarged.  Pulmonary: Clear to ausculation.  Normal effort. No rales, ronchi, or wheezing.  Cardiovascular: Regular rate and rhythm without murmur. Radial pulses are intact and equal bilaterally.  Abdomen: Nondistended.  Neurologic: Grossly nonfocal.  CN II through XII intact.  Lymph: No cervical or supraclavicular lymph nodes are palpable  Skin: Warm and dry.  No obvious lesions.  Musculoskeletal: Normal gait. No extremity cyanosis, clubbing, or edema.  Psych: Normal mood and affect. Alert and oriented x3. Judgment and insight are normal.    A chaperone was offered to the patient during today's exam. Patient declined chaperone.    Labs:   4/7/2022:  - CBC, RBC 4.58  - BMP, WNL    1/24/2022  - Lipid profile, total cholesterol 169, triglycerides 171, HDL 41, LDL 94%.    Assessment & Plan:   77 y.o. male with the following -    1.  Bilateral impacted cerumen  - Acute uncomplicated illness.  - Education and home maintenance was provided.  - Bilateral ear lavage was provided by the medical assistant in clinic today.  " Resolution was verified.    2.  Encounter to establish care  -  We discussed diet/exercise/healthy weight maintenance.  We discussed the need to always wear seatbelt.   -Patient is up-to-date on vaccines and other healthcare maintenance.    Return in about 1 year (around 8/4/2023).    Please note that this dictation was created using voice recognition software. I have made every reasonable attempt to correct obvious errors, but I expect that there are errors of grammar and possibly content that I did not discover before finalizing the note.    Anastacio Antoine PA-C 8/4/2022

## 2022-09-16 RX ORDER — ALBUTEROL SULFATE 90 UG/1
AEROSOL, METERED RESPIRATORY (INHALATION)
Qty: 60 EACH | Refills: 6 | Status: SHIPPED | OUTPATIENT
Start: 2022-09-16 | End: 2023-01-04

## 2022-09-19 RX ORDER — TIOTROPIUM BROMIDE INHALATION SPRAY 1.56 UG/1
1.25 SPRAY, METERED RESPIRATORY (INHALATION) DAILY
Qty: 4 G | Refills: 5 | Status: SHIPPED | OUTPATIENT
Start: 2022-09-19 | End: 2024-01-05 | Stop reason: SDUPTHER

## 2022-10-06 ENCOUNTER — TELEPHONE (OUTPATIENT)
Dept: MEDICAL GROUP | Facility: PHYSICIAN GROUP | Age: 77
End: 2022-10-06
Payer: MEDICARE

## 2022-10-06 RX ORDER — FLUTICASONE PROPIONATE AND SALMETEROL 100; 50 UG/1; UG/1
1 POWDER RESPIRATORY (INHALATION) EVERY 12 HOURS
Qty: 60 EACH | Refills: 0 | Status: SHIPPED | OUTPATIENT
Start: 2022-10-06 | End: 2022-11-01

## 2022-11-01 RX ORDER — FLUTICASONE PROPIONATE AND SALMETEROL 100; 50 UG/1; UG/1
POWDER RESPIRATORY (INHALATION)
Qty: 60 EACH | Refills: 3 | Status: SHIPPED | OUTPATIENT
Start: 2022-11-01 | End: 2023-03-04 | Stop reason: SDUPTHER

## 2022-11-08 ENCOUNTER — PATIENT MESSAGE (OUTPATIENT)
Dept: HEALTH INFORMATION MANAGEMENT | Facility: OTHER | Age: 77
End: 2022-11-08

## 2022-12-19 RX ORDER — TRAZODONE HYDROCHLORIDE 100 MG/1
200 TABLET ORAL NIGHTLY
Qty: 180 TABLET | Refills: 3 | Status: SHIPPED | OUTPATIENT
Start: 2022-12-19

## 2023-01-04 RX ORDER — ALBUTEROL SULFATE 90 UG/1
1 AEROSOL, METERED RESPIRATORY (INHALATION) EVERY 12 HOURS
Qty: 60 EACH | Refills: 6 | OUTPATIENT
Start: 2023-01-04

## 2023-01-04 RX ORDER — ALBUTEROL SULFATE 90 UG/1
2 AEROSOL, METERED RESPIRATORY (INHALATION) EVERY 4 HOURS PRN
Qty: 2 EACH | Refills: 5 | Status: SHIPPED | OUTPATIENT
Start: 2023-01-04

## 2023-01-11 ENCOUNTER — TELEPHONE (OUTPATIENT)
Dept: MEDICAL GROUP | Facility: PHYSICIAN GROUP | Age: 78
End: 2023-01-11
Payer: MEDICARE

## 2023-01-11 NOTE — TELEPHONE ENCOUNTER
"VOICEMAIL  1. Caller Name: Bayron Theodore Pryor                        Call Back Number: [phone] (home)       2. Message: pt needs refill of \"yupelri\" and albuteral sent to [address] pharmacy. Unable to pend due to med not being on pt med list    3. Patient approves office to leave a detailed voicemail/MyChart message: no  "

## 2023-03-04 RX ORDER — FLUTICASONE PROPIONATE AND SALMETEROL 100; 50 UG/1; UG/1
1 POWDER RESPIRATORY (INHALATION) EVERY 12 HOURS
Qty: 60 EACH | Refills: 5 | Status: SHIPPED | OUTPATIENT
Start: 2023-03-04 | End: 2023-06-06

## 2023-03-13 ENCOUNTER — APPOINTMENT (RX ONLY)
Dept: URBAN - METROPOLITAN AREA CLINIC 20 | Facility: CLINIC | Age: 78
Setting detail: DERMATOLOGY
End: 2023-03-13

## 2023-03-13 DIAGNOSIS — L57.0 ACTINIC KERATOSIS: ICD-10-CM

## 2023-03-13 DIAGNOSIS — L82.0 INFLAMED SEBORRHEIC KERATOSIS: ICD-10-CM

## 2023-03-13 DIAGNOSIS — D18.0 HEMANGIOMA: ICD-10-CM

## 2023-03-13 DIAGNOSIS — Z71.89 OTHER SPECIFIED COUNSELING: ICD-10-CM

## 2023-03-13 DIAGNOSIS — D22 MELANOCYTIC NEVI: ICD-10-CM

## 2023-03-13 DIAGNOSIS — L21.8 OTHER SEBORRHEIC DERMATITIS: ICD-10-CM

## 2023-03-13 DIAGNOSIS — L81.4 OTHER MELANIN HYPERPIGMENTATION: ICD-10-CM

## 2023-03-13 DIAGNOSIS — L82.1 OTHER SEBORRHEIC KERATOSIS: ICD-10-CM

## 2023-03-13 PROBLEM — D22.62 MELANOCYTIC NEVI OF LEFT UPPER LIMB, INCLUDING SHOULDER: Status: ACTIVE | Noted: 2023-03-13

## 2023-03-13 PROBLEM — D22.5 MELANOCYTIC NEVI OF TRUNK: Status: ACTIVE | Noted: 2023-03-13

## 2023-03-13 PROBLEM — D18.01 HEMANGIOMA OF SKIN AND SUBCUTANEOUS TISSUE: Status: ACTIVE | Noted: 2023-03-13

## 2023-03-13 PROBLEM — D22.61 MELANOCYTIC NEVI OF RIGHT UPPER LIMB, INCLUDING SHOULDER: Status: ACTIVE | Noted: 2023-03-13

## 2023-03-13 PROCEDURE — 99213 OFFICE O/P EST LOW 20 MIN: CPT | Mod: 25

## 2023-03-13 PROCEDURE — ? PRESCRIPTION

## 2023-03-13 PROCEDURE — 17000 DESTRUCT PREMALG LESION: CPT

## 2023-03-13 PROCEDURE — ? COUNSELING

## 2023-03-13 PROCEDURE — ? LIQUID NITROGEN

## 2023-03-13 PROCEDURE — ? MEDICATION COUNSELING

## 2023-03-13 PROCEDURE — 17003 DESTRUCT PREMALG LES 2-14: CPT

## 2023-03-13 PROCEDURE — ? SUNSCREEN RECOMMENDATIONS

## 2023-03-13 RX ORDER — KETOCONAZOLE 20 MG/ML
SHAMPOO, SUSPENSION TOPICAL BIW
Qty: 1 | Refills: 3 | Status: ERX

## 2023-03-13 RX ORDER — FLUOCINONIDE 0.5 MG/ML
SOLUTION TOPICAL QD
Qty: 60 | Refills: 3 | Status: ERX | COMMUNITY
Start: 2023-03-13

## 2023-03-13 RX ADMIN — FLUOCINONIDE: 0.5 SOLUTION TOPICAL at 00:00

## 2023-03-13 ASSESSMENT — LOCATION DETAILED DESCRIPTION DERM
LOCATION DETAILED: RIGHT PROXIMAL DORSAL FOREARM
LOCATION DETAILED: POSTERIOR MID-PARIETAL SCALP
LOCATION DETAILED: RIGHT VENTRAL DISTAL FOREARM
LOCATION DETAILED: RIGHT INFERIOR MEDIAL FOREHEAD
LOCATION DETAILED: RIGHT MEDIAL SUPERIOR CHEST
LOCATION DETAILED: RIGHT SUPERIOR FOREHEAD
LOCATION DETAILED: RIGHT RADIAL DORSAL HAND
LOCATION DETAILED: LEFT VENTRAL PROXIMAL FOREARM
LOCATION DETAILED: RIGHT INFERIOR UPPER BACK
LOCATION DETAILED: RIGHT MEDIAL UPPER BACK
LOCATION DETAILED: LEFT PROXIMAL DORSAL FOREARM
LOCATION DETAILED: RIGHT CENTRAL EYEBROW
LOCATION DETAILED: LEFT INFERIOR MEDIAL FOREHEAD
LOCATION DETAILED: LEFT RADIAL DORSAL HAND
LOCATION DETAILED: INFERIOR THORACIC SPINE
LOCATION DETAILED: SUPERIOR THORACIC SPINE
LOCATION DETAILED: LEFT CENTRAL EYEBROW

## 2023-03-13 ASSESSMENT — LOCATION SIMPLE DESCRIPTION DERM
LOCATION SIMPLE: RIGHT HAND
LOCATION SIMPLE: CHEST
LOCATION SIMPLE: LEFT EYEBROW
LOCATION SIMPLE: RIGHT UPPER BACK
LOCATION SIMPLE: LEFT FOREHEAD
LOCATION SIMPLE: UPPER BACK
LOCATION SIMPLE: POSTERIOR SCALP
LOCATION SIMPLE: RIGHT FOREHEAD
LOCATION SIMPLE: LEFT HAND
LOCATION SIMPLE: RIGHT EYEBROW
LOCATION SIMPLE: RIGHT FOREARM
LOCATION SIMPLE: LEFT FOREARM

## 2023-03-13 ASSESSMENT — LOCATION ZONE DERM
LOCATION ZONE: SCALP
LOCATION ZONE: HAND
LOCATION ZONE: FACE
LOCATION ZONE: ARM
LOCATION ZONE: TRUNK

## 2023-03-13 NOTE — PROCEDURE: LIQUID NITROGEN
Show Applicator Variable?: Yes
Consent: The patient's consent was obtained including but not limited to risks of crusting, scabbing, blistering, scarring, darker or lighter pigmentary change, recurrence, incomplete removal and infection. RTC in 2 months if lesion(s) persistent.
Post-Care Instructions: I reviewed with the patient in detail post-care instructions. Patient is to wear sunprotection, and avoid picking at any of the treated lesions. Pt may apply Vaseline to crusted or scabbing areas.
Number Of Freeze-Thaw Cycles: 2 freeze-thaw cycles
Render Note In Bullet Format When Appropriate: No
Detail Level: Detailed
Duration Of Freeze Thaw-Cycle (Seconds): 10

## 2023-06-05 ENCOUNTER — APPOINTMENT (OUTPATIENT)
Dept: MEDICAL GROUP | Facility: PHYSICIAN GROUP | Age: 78
End: 2023-06-05
Payer: MEDICARE

## 2023-06-06 ENCOUNTER — OFFICE VISIT (OUTPATIENT)
Dept: MEDICAL GROUP | Facility: PHYSICIAN GROUP | Age: 78
End: 2023-06-06
Payer: MEDICARE

## 2023-06-06 VITALS
HEIGHT: 70 IN | WEIGHT: 167.8 LBS | OXYGEN SATURATION: 90 % | BODY MASS INDEX: 24.02 KG/M2 | RESPIRATION RATE: 16 BRPM | TEMPERATURE: 98.7 F | DIASTOLIC BLOOD PRESSURE: 82 MMHG | SYSTOLIC BLOOD PRESSURE: 146 MMHG | HEART RATE: 81 BPM

## 2023-06-06 DIAGNOSIS — Z00.00 HEALTH CARE MAINTENANCE: ICD-10-CM

## 2023-06-06 DIAGNOSIS — F51.01 PRIMARY INSOMNIA: ICD-10-CM

## 2023-06-06 DIAGNOSIS — J44.9 COPD MIXED TYPE (HCC): ICD-10-CM

## 2023-06-06 DIAGNOSIS — E78.1 HYPERTRIGLYCERIDEMIA: ICD-10-CM

## 2023-06-06 PROCEDURE — 99214 OFFICE O/P EST MOD 30 MIN: CPT | Performed by: STUDENT IN AN ORGANIZED HEALTH CARE EDUCATION/TRAINING PROGRAM

## 2023-06-06 PROCEDURE — 3079F DIAST BP 80-89 MM HG: CPT | Performed by: STUDENT IN AN ORGANIZED HEALTH CARE EDUCATION/TRAINING PROGRAM

## 2023-06-06 PROCEDURE — 3077F SYST BP >= 140 MM HG: CPT | Performed by: STUDENT IN AN ORGANIZED HEALTH CARE EDUCATION/TRAINING PROGRAM

## 2023-06-06 RX ORDER — BUDESONIDE AND FORMOTEROL FUMARATE DIHYDRATE 80; 4.5 UG/1; UG/1
2 AEROSOL RESPIRATORY (INHALATION) 2 TIMES DAILY
Qty: 2 EACH | Refills: 5 | Status: SHIPPED | OUTPATIENT
Start: 2023-06-06 | End: 2023-12-13

## 2023-06-06 RX ORDER — FLUOCINONIDE TOPICAL SOLUTION USP, 0.05% 0.5 MG/ML
SOLUTION TOPICAL
COMMUNITY
Start: 2023-03-13

## 2023-06-06 RX ORDER — KETOCONAZOLE 20 MG/ML
SHAMPOO TOPICAL
COMMUNITY
Start: 2023-03-13

## 2023-06-06 ASSESSMENT — FIBROSIS 4 INDEX: FIB4 SCORE: 0.77

## 2023-06-06 ASSESSMENT — PATIENT HEALTH QUESTIONNAIRE - PHQ9: CLINICAL INTERPRETATION OF PHQ2 SCORE: 0

## 2023-06-17 NOTE — PROGRESS NOTES
Subjective:     CC:  Diagnoses of Health care maintenance, COPD mixed type (HCC), Primary insomnia, and Hypertriglyceridemia were pertinent to this visit.    HISTORY OF THE PRESENT ILLNESS: Patient is a 78 y.o. male. This pleasant patient is here today to discuss:    1. Health care maintenance  No dentistry.  Poor exercise.  Terrible diet.  Patient not interested in making any changes.    2. COPD mixed type (HCC)  Symbicort 80-4.5, 2 puffs twice daily.  Spiriva 1.25 mcg daily.  Infrequent use of albuterol MDI.    3. Primary insomnia  Trazodone 200 mg nightly.  Patient feels this provides him with adequate relief.  He is aware of the increased fall risk and feels that the benefits outweigh the risks.    4. Hypertriglyceridemia  Not currently treated with diet or medication.    Active Diagnosis:    Patient Active Problem List   Diagnosis    Chronic bilateral low back pain with left-sided sciatica    Eczema    Primary insomnia    Erectile dysfunction    Post herpetic neuralgia    COPD mixed type (HCC)    BRANDEE (obstructive sleep apnea)    Former smoker    Hypoxia    Chronic pain syndrome    Spinal stenosis, lumbar      Current Outpatient Medications Ordered in Epic   Medication Sig Dispense Refill    fluocinonide (LIDEX) 0.05 % external solution APPLY ONCE DAILY TO ITCHING AREAS ON SCALP LINE FOR 2 WEEKS WHEN FLARING.      ketoconazole (NIZORAL) 2 % shampoo PLEASE SEE ATTACHED FOR DETAILED DIRECTIONS      budesonide-formoterol (SYMBICORT) 80-4.5 MCG/ACT Aerosol Inhale 2 Puffs 2 times a day. 2 Each 5    Revefenacin 175 MCG/3ML Solution Inhale 175 mcg every day. 90 mL 5    albuterol 108 (90 Base) MCG/ACT Aero Soln inhalation aerosol Inhale 2 Puffs every four hours as needed for Shortness of Breath. 2 Each 5    traZODone (DESYREL) 100 MG Tab TAKE 2 TABLETS BY MOUTH EVERY EVENING 180 Tablet 3    Tiotropium Bromide Monohydrate (SPIRIVA RESPIMAT) 1.25 MCG/ACT Aero Soln Inhale 1.25 mcg every day. 4 g 5    triamcinolone  "acetonide (KENALOG) 0.1 % Ointment APPLY TO AFFECTED AREA TWICE A DAY 15 g 0    Misc. Devices Misc Overnight pulse oximetry to check for apnea episodes and low oxygen. 1 Each 11    oxyCODONE-acetaminophen (PERCOCET-10)  MG Tab Take 1 Tab by mouth every four hours as needed for Severe Pain.      tizanidine (ZANAFLEX) 4 MG Tab Take 1 Tab by mouth every 6 hours as needed. 30 Tab 0     No current Epic-ordered facility-administered medications on file.     ROS:   Gen: No fevers/chills, no changes in weight  HEENT: No changes in vision/hearing, sore throat.  Pulm: No cough, unexplained SOB.  CV: No chest pain/pressure, no palpitations  GI: No nausea/vomiting, no diarrhea  : No dysuria/nocturia  MSk: No myalgias  Skin: No rash/skin changes  Neuro: No headaches, no numbness/tingling  Heme/Lymph: no easy bruising      Objective:     Exam: BP (!) 146/82 (BP Location: Left arm, Patient Position: Sitting, BP Cuff Size: Adult)   Pulse 81   Temp 37.1 °C (98.7 °F) (Temporal)   Resp 16   Ht 1.778 m (5' 10\")   Wt 76.1 kg (167 lb 12.8 oz)   SpO2 90%  Body mass index is 24.08 kg/m².    General: Normal appearing. No distress.  HEENT: Normocephalic. Eyes conjunctiva clear lids without ptosis. Pupils equal and reactive to light accommodation. Ears normal shape and contour. Canals are clear bilaterally, tympanic membranes are benign. Nasal mucosa benign, oropharynx is without erythema, edema or exudates.   Neck: Supple without JVD. Thyroid is not enlarged.  Pulmonary: Clear to ausculation.  Normal effort. No rales, ronchi, or wheezing.  Cardiovascular: Regular rate and rhythm without murmur. Radial pulses are intact and equal bilaterally.  Abdomen: Nondistended   neurologic: Grossly nonfocal.  CN II through XII intact.  Lymph: No cervical or supraclavicular lymph nodes are palpable  Skin: Warm and dry.  No obvious lesions.  Musculoskeletal: Cane assisted gait. No extremity cyanosis, clubbing, or edema.  Psych: Normal mood " and affect. Alert and oriented x3. Judgment and insight are normal.        Assessment & Plan:   78 y.o. male with the following -    Labs:   4/7/2022:  -CBC showing RBC count 4.58, otherwise normal.  -BMP, WNL    1. Health care maintenance  -Improved diet and regular exercise recommended.  - Comp Metabolic Panel; Future    2. COPD mixed type (HCC)  -Chronic, stable.  -Continue Symbicort 80-4.5, 2 puffs twice daily.  -Continue Spiriva 1.25 mcg daily.  -Continue albuterol MDI as needed.    3. Primary insomnia  -Chronic, stable.  Well treated without side effect.  -Trazodone 200 mg nightly.    4. Hypertriglyceridemia  -Unknown status.  - Lipid Profile; Future    Return in about 6 months (around 12/6/2023).  Follow-up on multiple chronic illnesses.    Please note that this dictation was created using voice recognition software. I have made every reasonable attempt to correct obvious errors, but I expect that there are errors of grammar and possibly content that I did not discover before finalizing the note.      Anastacio Antoine PA-C 6/17/2023

## 2023-08-02 ENCOUNTER — DOCUMENTATION (OUTPATIENT)
Dept: HEALTH INFORMATION MANAGEMENT | Facility: OTHER | Age: 78
End: 2023-08-02
Payer: MEDICARE

## 2023-11-29 ENCOUNTER — PATIENT MESSAGE (OUTPATIENT)
Dept: HEALTH INFORMATION MANAGEMENT | Facility: OTHER | Age: 78
End: 2023-11-29

## 2023-12-13 ENCOUNTER — OFFICE VISIT (OUTPATIENT)
Dept: MEDICAL GROUP | Facility: PHYSICIAN GROUP | Age: 78
End: 2023-12-13
Payer: MEDICARE

## 2023-12-13 VITALS
HEART RATE: 83 BPM | WEIGHT: 168 LBS | DIASTOLIC BLOOD PRESSURE: 80 MMHG | TEMPERATURE: 98 F | OXYGEN SATURATION: 98 % | BODY MASS INDEX: 24.05 KG/M2 | HEIGHT: 70 IN | SYSTOLIC BLOOD PRESSURE: 140 MMHG

## 2023-12-13 DIAGNOSIS — Z79.899 OTHER LONG TERM (CURRENT) DRUG THERAPY: ICD-10-CM

## 2023-12-13 DIAGNOSIS — Z23 NEED FOR VACCINATION: ICD-10-CM

## 2023-12-13 DIAGNOSIS — R09.02 HYPOXEMIA: ICD-10-CM

## 2023-12-13 DIAGNOSIS — F51.01 PRIMARY INSOMNIA: ICD-10-CM

## 2023-12-13 DIAGNOSIS — G89.29 CHRONIC BILATERAL LOW BACK PAIN WITH LEFT-SIDED SCIATICA: ICD-10-CM

## 2023-12-13 DIAGNOSIS — M54.42 CHRONIC BILATERAL LOW BACK PAIN WITH LEFT-SIDED SCIATICA: ICD-10-CM

## 2023-12-13 DIAGNOSIS — J44.9 COPD MIXED TYPE (HCC): Primary | ICD-10-CM

## 2023-12-13 DIAGNOSIS — G47.33 OSA (OBSTRUCTIVE SLEEP APNEA): ICD-10-CM

## 2023-12-13 PROCEDURE — 90471 IMMUNIZATION ADMIN: CPT | Performed by: NURSE PRACTITIONER

## 2023-12-13 PROCEDURE — 90715 TDAP VACCINE 7 YRS/> IM: CPT | Performed by: NURSE PRACTITIONER

## 2023-12-13 PROCEDURE — 3077F SYST BP >= 140 MM HG: CPT | Performed by: NURSE PRACTITIONER

## 2023-12-13 PROCEDURE — 3079F DIAST BP 80-89 MM HG: CPT | Performed by: NURSE PRACTITIONER

## 2023-12-13 PROCEDURE — 99214 OFFICE O/P EST MOD 30 MIN: CPT | Mod: 25 | Performed by: NURSE PRACTITIONER

## 2023-12-13 RX ORDER — BUDESONIDE AND FORMOTEROL FUMARATE DIHYDRATE 160; 4.5 UG/1; UG/1
2 AEROSOL RESPIRATORY (INHALATION) 2 TIMES DAILY
Qty: 6 G | Refills: 5 | Status: SHIPPED | OUTPATIENT
Start: 2023-12-13

## 2023-12-13 ASSESSMENT — ENCOUNTER SYMPTOMS
HEADACHES: 0
CHILLS: 0
DIZZINESS: 0
SHORTNESS OF BREATH: 1
WEIGHT LOSS: 0
VOMITING: 0
FEVER: 0
NAUSEA: 0
ABDOMINAL PAIN: 0
MYALGIAS: 0

## 2023-12-13 ASSESSMENT — FIBROSIS 4 INDEX: FIB4 SCORE: 0.77

## 2023-12-13 NOTE — PROGRESS NOTES
Subjective:     CC: [facility]    HPI:   Bayron is a 78 y.o. male presents to establish care. Previous PCP was Anastacio Antoine PA-C. Specialists: None. Pt would like to discuss the following today:    Problem   Ike (Obstructive Sleep Apnea)    This is a chronic condition. He states that he cannot sleep with his current CPAP, so he does not use it much. He was also previously on supplemental oxygen nocturnally. He is not currently interested in troubleshooting this issue.      Hypoxemia    He reports dyspnea on exertion.  When I recommended we perform a 6-minute walk test, he inform me that he had previously been advised to use supplemental oxygen with exertion.  He does not currently do this.  And will like to wait on performing a 6-minute walk test.  He will plan to instead use supplemental oxygen during exertion as previously advised.     Copd Mixed Type (Hcc)    Had spirometry in 2021, which revealed mild obstruction (FEV1/FVC of 54.56%).   Current regimen: Symbicort 80-4.5 mcg/act 2 puffs twice a day  He is not currently taking Spiriva 1.25mcg since it is cost prohibitive. However, he is getting new insurance on 01/01/2024 and would like Spiriva re-sent. Reports he uses albuterol when he is exerting himself. He uses albuterol about 2x/day.      Chronic Bilateral Low Back Pain With Left-Sided Sciatica    This is a chronic condition. S/p laminectomy in 04/2022. He is currently followed by JOHNNY Evangelista at [facility] neurosurgery. He is taking percocet  QID and tizanidine 4mg for pain relief.      Primary Insomnia    This is a chronic condition. He is currently taking Trazodone 100mg 2 tablets nightly which does aid in sleep onset. He reports that with taking Trazodone, he is able to fall asleep without problem.   He does not go to sleep around the same at night. Does not drink alcohol.         Health Maintenance/Immunizations: Completed    Current Outpatient Medications   Medication Sig Dispense Refill  "   budesonide-formoterol (SYMBICORT) 160-4.5 MCG/ACT Aerosol Inhale 2 Puffs 2 times a day. 6 g 5    fluocinonide (LIDEX) 0.05 % external solution APPLY ONCE DAILY TO ITCHING AREAS ON SCALP LINE FOR 2 WEEKS WHEN FLARING.      ketoconazole (NIZORAL) 2 % shampoo PLEASE SEE ATTACHED FOR DETAILED DIRECTIONS      Revefenacin 175 MCG/3ML Solution Inhale 175 mcg every day. 90 mL 5    albuterol 108 (90 Base) MCG/ACT Aero Soln inhalation aerosol Inhale 2 Puffs every four hours as needed for Shortness of Breath. 2 Each 5    traZODone (DESYREL) 100 MG Tab TAKE 2 TABLETS BY MOUTH EVERY EVENING 180 Tablet 3    triamcinolone acetonide (KENALOG) 0.1 % Ointment APPLY TO AFFECTED AREA TWICE A DAY 15 g 0    Misc. Devices Misc Overnight pulse oximetry to check for apnea episodes and low oxygen. 1 Each 11    oxyCODONE-acetaminophen (PERCOCET-10)  MG Tab Take 1 Tab by mouth every four hours as needed for Severe Pain.      tizanidine (ZANAFLEX) 4 MG Tab Take 1 Tab by mouth every 6 hours as needed. 30 Tab 0    Tiotropium Bromide Monohydrate (SPIRIVA RESPIMAT) 1.25 MCG/ACT Aero Soln Inhale 1.25 mcg every day. (Patient not taking: Reported on 12/13/2023) 4 g 5     No current facility-administered medications for this visit.     Past Medical History:   Diagnosis Date    Anesthesia     pt  states he can not lie flat or he \"will stop breathing\" told his larynx is narrow    Apnea, sleep 04/07/2022    uses CPAP wears oxygen 2-3 L     Arthritis 04/07/2022    osteo hands, lumbar    Asthma     daily inhaler, wears oxygen 2-3 at night and prn with long distances , uses rescue inhaler once a day     Breath shortness 04/07/2022    uses O2, can not lie flat or \"I stop breathing\"    Daytime sleepiness     Dental disorder 04/07/2022    full dentures upper and lower    Emphysema of lung (HCC) 04/07/2022    COPD    Gasping for breath     Faroese measles     Pain 04/07/2022    back    Pneumonia 2018    Psychiatric problem 04/07/2022    anxiety at times "    Shingles 03/01/2018    Snoring 04/07/2022     Past Surgical History:   Procedure Laterality Date    LUMBAR LAMINECTOMY DISKECTOMY Left 4/14/2022    Procedure: LAMINECTOMY, SPINE, LUMBAR, WITH DISCECTOMY - L4;  Surgeon: Nikolas Dooley M.D.;  Location: [facility];  Service: Neurosurgery    LUMBAR DECOMPRESSION  4/14/2022    Procedure: DECOMPRESSION, SPINE, LUMBAR - POSTERIOR L3 TRANSPEDICULAR;  Surgeon: Nikolas Dooley M.D.;  Location: [facility];  Service: Neurosurgery    LUMBAR FUSION O-ARM  10/9/2019    Procedure: FUSION, SPINE, LUMBAR, WITH O-ARM IMAGING GUIDANCE- L4-5 TLIF;  Surgeon: Nikolas Dooley M.D.;  Location: [facility];  Service: Neurosurgery    LUMBAR LAMINECTOMY DISKECTOMY  10/9/2019    Procedure: LAMINECTOMY, SPINE, LUMBAR, WITH DISCECTOMY- REDO L3-S1 LAMI;  Surgeon: Nikolas Dooley M.D.;  Location: [facility];  Service: Neurosurgery    LUMBAR DECOMPRESSION  10/9/2019    Procedure: DECOMPRESSION, SPINE, LUMBAR- RIGHT L5 TRANSPEDICULAR;  Surgeon: Nikolas Dooley M.D.;  Location: [facility];  Service: Neurosurgery    LUMBAR LAMINECTOMY DISKECTOMY  3/14/2019    Procedure: LUMBAR LAMINECTOMY DISKECTOMY- L4-5, MEDIAL FACETECTOMY;  Surgeon: Edmond Fung M.D.;  Location: [facility];  Service: Neurosurgery    FORAMINOTOMY Bilateral 3/14/2019    Procedure: FORAMINOTOMY;  Surgeon: Edmond Fung M.D.;  Location: [facility];  Service: Neurosurgery    TONSILLECTOMY        Family History   Problem Relation Age of Onset    Cancer Father         Esophogial/smoker    No Known Problems Mother     No Known Problems Brother     No Known Problems Son     No Known Problems Daughter     No Known Problems Daughter     No Known Problems Maternal Grandmother     No Known Problems Maternal Grandfather     No Known Problems Paternal Grandmother     No Known Problems Paternal Grandfather     Sleep Apnea Neg Hx      Social History  "    Tobacco Use    Smoking status: Former     Current packs/day: 0.00     Average packs/day: 1 pack/day for 39.0 years (39.0 ttl pk-yrs)     Types: Cigarettes     Start date: 1961     Quit date: 2000     Years since quittin.1    Smokeless tobacco: Never    Tobacco comments:     started smoking at age 16   Vaping Use    Vaping Use: Never used   Substance Use Topics    Alcohol use: No    Drug use: No      Review of Systems   Constitutional:  Negative for chills, fever and weight loss.   Respiratory:  Positive for shortness of breath.    Cardiovascular:  Negative for chest pain.   Gastrointestinal:  Negative for abdominal pain, nausea and vomiting.   Musculoskeletal:  Negative for myalgias.   Neurological:  Negative for dizziness and headaches.      Objective:     BP (!) 140/80 (BP Location: Right arm, Patient Position: Sitting, BP Cuff Size: Adult)   Pulse 83   Temp 36.7 °C (98 °F) (Temporal)   Ht 1.778 m (5' 10\")   Wt 76.2 kg (168 lb)   SpO2 98%   BMI 24.11 kg/m²  Body mass index is 24.11 kg/m².     Wt Readings from Last 4 Encounters:   23 76.2 kg (168 lb)   23 76.1 kg (167 lb 12.8 oz)   22 76.6 kg (168 lb 12.8 oz)   22 78.2 kg (172 lb 6.4 oz)     Physical Exam  Constitutional:       Appearance: Normal appearance.   Eyes:      Conjunctiva/sclera: Conjunctivae normal.      Pupils: Pupils are equal, round, and reactive to light.   Cardiovascular:      Rate and Rhythm: Normal rate and regular rhythm.      Heart sounds: Normal heart sounds. No murmur heard.  Pulmonary:      Effort: Pulmonary effort is normal. No respiratory distress.      Breath sounds: Normal breath sounds.   Musculoskeletal:      Right lower leg: No edema.      Left lower leg: No edema.   Lymphadenopathy:      Cervical: No cervical adenopathy.   Skin:     General: Skin is warm and dry.   Neurological:      General: No focal deficit present.      Mental Status: He is alert and oriented to person, place, and " time.   Psychiatric:         Mood and Affect: Mood normal.         Behavior: Behavior normal.        Assessment and Plan:   78 y.o. male with the following -    1. COPD mixed type (HCC)  Chronic, stable. I would much prefer he be on a LAMA for optimal COPD treatment. We will try sending this in for him early next year given his change in insurance coverage. Symbicort increased from 80-4.5mcg/act to 160-4.5mcg/act. He is up to date on pneumonia, flu and COVID-19 vaccines.  - budesonide-formoterol (SYMBICORT) 160-4.5 MCG/ACT Aerosol; Inhale 2 Puffs 2 times a day.  Dispense: 6 g; Refill: 5    2. Hypoxemia  He declined a walk test today in office, but is agreeable to performing one at next visit.  For now, I recommended he monitor his oxygen level will during exertion and use supplemental oxygen as previously directed.    3. Primary insomnia  Chronic, stable. He has several areas that could be improved in his sleep hygiene including avoidance of screens, having a regular bedtime, and regular exercise during the day.  Strongly recommended he work on his sleep hygiene to improve his insomnia.  For now, he can continue his current regimen.  - Trazodone 100 mg 2 tablets by mouth nightly as needed for sleep    4. BRANDEE (obstructive sleep apnea)  Chronic, non-adherent to treatment with CPAP. Strongly emphasized importance of treating sleep apnea. He declined referral to sleep apnea.     5. Chronic bilateral low back pain with left-sided sciatica  Chronic, stable. Continue follow up with [facility] as directed.    6. Other long term (current) drug therapy  - Comp Metabolic Panel; Future  - CBC WITH DIFFERENTIAL; Future    7. Need for vaccination  - Tdap Vaccine =>8YO IM    Return in about 3 months (around 3/13/2024) for Medicare AWV.    Please note that this dictation was created using voice recognition software. I have made every reasonable attempt to correct obvious errors, but I expect that there are errors of  grammar and possibly content that I did not discover before finalizing the note.

## 2024-01-04 ENCOUNTER — PATIENT MESSAGE (OUTPATIENT)
Dept: MEDICAL GROUP | Facility: PHYSICIAN GROUP | Age: 79
End: 2024-01-04
Payer: MEDICARE

## 2024-01-04 DIAGNOSIS — J44.9 COPD MIXED TYPE (HCC): ICD-10-CM

## 2024-01-05 RX ORDER — TIOTROPIUM BROMIDE INHALATION SPRAY 1.56 UG/1
1.25 SPRAY, METERED RESPIRATORY (INHALATION) DAILY
Qty: 4 G | Refills: 5 | Status: SHIPPED | OUTPATIENT
Start: 2024-01-05

## 2024-03-12 ENCOUNTER — APPOINTMENT (RX ONLY)
Dept: URBAN - METROPOLITAN AREA CLINIC 20 | Facility: CLINIC | Age: 79
Setting detail: DERMATOLOGY
End: 2024-03-12

## 2024-03-12 DIAGNOSIS — L57.0 ACTINIC KERATOSIS: ICD-10-CM

## 2024-03-12 DIAGNOSIS — D18.0 HEMANGIOMA: ICD-10-CM

## 2024-03-12 DIAGNOSIS — Z71.89 OTHER SPECIFIED COUNSELING: ICD-10-CM

## 2024-03-12 DIAGNOSIS — D22 MELANOCYTIC NEVI: ICD-10-CM

## 2024-03-12 DIAGNOSIS — L81.4 OTHER MELANIN HYPERPIGMENTATION: ICD-10-CM

## 2024-03-12 DIAGNOSIS — L82.1 OTHER SEBORRHEIC KERATOSIS: ICD-10-CM

## 2024-03-12 PROBLEM — D22.61 MELANOCYTIC NEVI OF RIGHT UPPER LIMB, INCLUDING SHOULDER: Status: ACTIVE | Noted: 2024-03-12

## 2024-03-12 PROBLEM — D22.62 MELANOCYTIC NEVI OF LEFT UPPER LIMB, INCLUDING SHOULDER: Status: ACTIVE | Noted: 2024-03-12

## 2024-03-12 PROBLEM — D22.5 MELANOCYTIC NEVI OF TRUNK: Status: ACTIVE | Noted: 2024-03-12

## 2024-03-12 PROBLEM — D18.01 HEMANGIOMA OF SKIN AND SUBCUTANEOUS TISSUE: Status: ACTIVE | Noted: 2024-03-12

## 2024-03-12 PROCEDURE — ? LIQUID NITROGEN

## 2024-03-12 PROCEDURE — 99213 OFFICE O/P EST LOW 20 MIN: CPT | Mod: 25

## 2024-03-12 PROCEDURE — 17000 DESTRUCT PREMALG LESION: CPT

## 2024-03-12 PROCEDURE — ? SUNSCREEN RECOMMENDATIONS

## 2024-03-12 PROCEDURE — ? COUNSELING

## 2024-03-12 PROCEDURE — 17003 DESTRUCT PREMALG LES 2-14: CPT

## 2024-03-12 ASSESSMENT — LOCATION DETAILED DESCRIPTION DERM
LOCATION DETAILED: RIGHT DORSAL WRIST
LOCATION DETAILED: INFERIOR THORACIC SPINE
LOCATION DETAILED: LEFT VENTRAL PROXIMAL FOREARM
LOCATION DETAILED: LEFT ANTERIOR LATERAL DISTAL UPPER ARM
LOCATION DETAILED: RIGHT SUPERIOR MEDIAL FOREHEAD
LOCATION DETAILED: RIGHT CENTRAL FRONTAL SCALP
LOCATION DETAILED: LEFT PROXIMAL DORSAL FOREARM
LOCATION DETAILED: RIGHT PROXIMAL DORSAL FOREARM
LOCATION DETAILED: LEFT RADIAL DORSAL HAND
LOCATION DETAILED: RIGHT RADIAL DORSAL HAND
LOCATION DETAILED: NASAL ROOT
LOCATION DETAILED: RIGHT VENTRAL DISTAL FOREARM
LOCATION DETAILED: RIGHT INFERIOR MEDIAL FOREHEAD
LOCATION DETAILED: RIGHT INFERIOR UPPER BACK
LOCATION DETAILED: LEFT SUPERIOR FOREHEAD
LOCATION DETAILED: LEFT LATERAL EYEBROW
LOCATION DETAILED: RIGHT SUPERIOR FOREHEAD
LOCATION DETAILED: SUPERIOR THORACIC SPINE
LOCATION DETAILED: RIGHT MEDIAL UPPER BACK
LOCATION DETAILED: LEFT MID PREAURICULAR CHEEK

## 2024-03-12 ASSESSMENT — LOCATION ZONE DERM
LOCATION ZONE: NOSE
LOCATION ZONE: FACE
LOCATION ZONE: ARM
LOCATION ZONE: TRUNK
LOCATION ZONE: HAND
LOCATION ZONE: SCALP

## 2024-03-12 ASSESSMENT — LOCATION SIMPLE DESCRIPTION DERM
LOCATION SIMPLE: RIGHT FOREARM
LOCATION SIMPLE: LEFT CHEEK
LOCATION SIMPLE: RIGHT SCALP
LOCATION SIMPLE: RIGHT UPPER BACK
LOCATION SIMPLE: RIGHT FOREHEAD
LOCATION SIMPLE: LEFT FOREHEAD
LOCATION SIMPLE: NOSE
LOCATION SIMPLE: LEFT EYEBROW
LOCATION SIMPLE: LEFT UPPER ARM
LOCATION SIMPLE: RIGHT HAND
LOCATION SIMPLE: LEFT HAND
LOCATION SIMPLE: LEFT FOREARM
LOCATION SIMPLE: UPPER BACK
LOCATION SIMPLE: RIGHT WRIST

## 2024-03-12 NOTE — PROCEDURE: LIQUID NITROGEN
Show Applicator Variable?: Yes
Render Note In Bullet Format When Appropriate: No
Detail Level: Detailed
Duration Of Freeze Thaw-Cycle (Seconds): 10
Post-Care Instructions: I reviewed with the patient in detail post-care instructions. Patient is to wear sunprotection, and avoid picking at any of the treated lesions. Pt may apply Vaseline to crusted or scabbing areas.
Number Of Freeze-Thaw Cycles: 2 freeze-thaw cycles
Consent: The patient's consent was obtained including but not limited to risks of crusting, scabbing, blistering, scarring, darker or lighter pigmentary change, recurrence, incomplete removal and infection. RTC in 2 months if lesion(s) persistent.

## 2024-04-25 NOTE — OP REPORT
DATE OF SERVICE:  10/09/2019    PREOPERATIVE DIAGNOSES:  1.  Lumbar stenosis with radiculopathy.  2.  Bilateral far lateral L4 degenerated disks.  3.  L4-L5 spondylolisthesis.    POSTOPERATIVE DIAGNOSES:  1.  Lumbar stenosis with radiculopathy.  2.  Bilateral far lateral L4 degenerated disks.  3.  L4-L5 spondylolisthesis.    OPERATIONS:  1.  Bilateral L4 redo laminectomy, total facetectomies, excision of bilateral   lateral disk, decompression of bilateral L4 nerve roots.  2.  Bilateral L5 redo laminectomy, medial facetectomy, right-sided   foraminotomy, decompression of bilateral L5 nerve roots.  3.  Bilateral S1 partial laminectomy, medial facetectomy, decompression of   bilateral S1 nerve roots.  4.  L4-L5 fusion with posterolateral fusion plus translumbar interbody fusion.  5.  Placement of translumbar interbody cage, L4-L5 (Medtronic Elevate cage   10x28 mm).  6.  Harvesting and preparation of local bone graft plus left iliac crest   aspirate.  7.  Placement of L4-L5 nonsegmental pedicle screw instrumentation (Medtronic   Solera system).  8.  O-arm frameless stereotaxy.    SURGEON:  Nikolas Dooley MD    ASSISTANT:  BULMARO Mcgraw    ANESTHESIA:  General endotracheal.    ANESTHESIOLOGIST:  Bayron Norwood MD    PREPARATION:  ChloraPrep.    MEDICATIONS:  The patient was given Ancef prior to incision.    INDICATIONS:  The patient had previous decompressive surgery, persistent   radicular pain, had a slip at L4-L5 with significant bilateral L4 roots   stenosis.  Patient had evidence of lateral recess stenosis L5-S1, foraminal   narrowing to the right L5 root.  Patient was felt to be a candidate for the   proposed procedure in an attempt to improve radicular pain.  Patient   understood major risks, complications, death, and paralysis exceedingly rare.    Biggest risk of surgery is nonresponse, which is 10-15%, small risk of wound   infection, spinal fluid leak, chance to require another operation rest of his  Please consider and do recommend the 2 part Shingrix vaccine (can have here or local pharmacy) can have minor flu like syndrome for 12-18 hours, sore arm or no issue  Try Veozah (once we get your labs back and confirm liver enzymes are normal) one a day for hot flashes/night sweats from menopause-should see relief within 1-2 weeks  Get blood work and urine done today and we will call with results  Continue CPAP use nightly  Continue meds as directed for migraines-refill provided  Continue to work on weight loss with healthy diet-lean protein/fish/veggies and low carbs/low sugar  Exercise regularly with goal of 150 minutes/week  Call and schedule mammogram as you are due now  Get into GYN for routine pelvic/pap exam you are past due  Follow up here in 3 months to see how menopause symptoms are with medication      life 15%.  Patient is understanding and agreed to proceed and signed the   consent.    DESCRIPTION OF PROCEDURE:  Patient was brought to the operating room.    Peripheral venous lines were placed.  General anesthesia was induced.  Patient   was intubated.  Knutson catheter was placed.  Patient was laid prone on the OSI   table using 6 posts.  Pressure points were carefully padded.  Back was   shaved, sterilely prepped and draped.  Previous incision was infiltrated with   local and incised with scalpel using electrocautery.  Dissection was carried   down to and through deep fascia.  We dissected out the facet joints L3-L4,   L4-L5, and L5-S1 bilaterally.  The inferior facets of L4 removed, tips of the   superior facets of L5, and dissected down through scar tissue, removed   remaining lamina of L5, foraminotomy over the right L5 root, decompressed S1   roots bilaterally and lateral recess.  We excised lateral disk underneath the   distal L4 roots bilaterally with downbiting curettes, drill, #15 blade, and   pituitary rongeurs.  Next, the O-arm was brought in.  There was good   correlation with the local anatomy.  Pedicle screws were placed bilaterally at   L4-L5.  Initial hole began with Midas Juan Alberto drill, deepened with Paolouard,   then with the 5.5 mm tap.  Palpation ensured no penetration outside the   pedicle or vertebral body.  I placed a 6.5x50 mm screws at all 4 sites.  Good   bone purchase was obtained at all 4 sites, 30 mm lordosed rods were placed in   the heads of the screws.  Locking screws were partially tightened.    Distraction was applied from the left side.  We removed the L4-L5 disk with   paddle devora, serrated curettes, and pituitary rongeurs.  Remaining bone   graft was placed in depth of the cage after bone was placed posterolaterally   along the decorticated transverse processes of L4-L5.  MasterGraft brick was   placed on the left and the right along with the iliac crest aspirate.  The   cage  was tamped into place, slightly countersunk and fully expanded.  Final   tightening of the locking screws on the pedicle screws was carried out x4.  A   322 cross connector was placed, tightened x3, grasped instrumentation at L4   and L5 moved as a single unit.  The bilateral L4, L5, and S1 roots were   completely decompressed.  Throughout the case, wound was irrigated with   antibiotic irrigation.  Medium Hemovac drain was placed in depth of wound,   brought out through a separate stab incision.  Deep fascia was closed with #1   Vicryl, subcutaneous fascia with #0 Vicryl, subcuticular closed with 3-0   Vicryl, and skin edges approximated with quarter-inch Steri-Strips.  Sterile   dressing was placed.  Patient was laid supine on the bed, extubated, taken to   recovery room in satisfactory condition.  The patient tolerated the procedure   well without apparent complications.    ESTIMATED BLOOD LOSS:  300 mL.       ____________________________________     MD ANTONIO POWERS / LAODNNA    DD:  10/09/2019 15:47:21  DT:  10/09/2019 17:44:11    D#:  [id number]  Job#:  [id number]    cc: Bayron Norwood MD

## 2024-05-01 ENCOUNTER — DOCUMENTATION (OUTPATIENT)
Dept: HEALTH INFORMATION MANAGEMENT | Facility: OTHER | Age: 79
End: 2024-05-01
Payer: COMMERCIAL

## 2024-05-29 ENCOUNTER — TELEPHONE (OUTPATIENT)
Dept: SLEEP MEDICINE | Facility: MEDICAL CENTER | Age: 79
End: 2024-05-29

## 2024-05-29 NOTE — TELEPHONE ENCOUNTER
Bayron calling today in need of a new o2 order. States he contacted [facility] to  some of the o2 tanks he had due to not using them all. He states that [facility] would not take just come of the tanks and picked up everything - tanks and concentrator leaving patient with no o2 and has now been off of o2 for 1.5 weeks.     He states he qualifies for [employer] and would like an order placed for portable oxygen. He attemped to have PCP complete order but received a letter that his PCP is leaving 06/01/2024 and is unable to provide him with assistance.    Patient last seen 03/01/2022 by Hermilo Mustafa, informed patient that due to being 2 years since seen, an appointment is necessary for documentation and orders. Patient is scheduled for the earliest appointment available at time of call on 07/24/2024 with Mercedes Mustafa. He states this is too long to wait for new o2 to be provided. Appointment is placed on waitlist.     Please review and advise if anything can be done prior to appointment or if there is any time he can be worked into an earlier appointment.

## 2024-05-30 NOTE — TELEPHONE ENCOUNTER
Per Mercedes Mustafa APRN   Patient has only ever been seen for sleep - see 2020 referral. I recommend he come in and see first available sleep provider, do multi ox, document O2 needs and place orders. If he needs pulm referral, to have that placed so we can evaluate him as a new pulm patient at later date.       Called pt and schedule sooner apt on 6/18/2024 @ 11:20 am

## 2024-06-17 ENCOUNTER — TELEPHONE (OUTPATIENT)
Dept: SLEEP MEDICINE | Facility: MEDICAL CENTER | Age: 79
End: 2024-06-17
Payer: COMMERCIAL

## 2024-06-18 ENCOUNTER — APPOINTMENT (OUTPATIENT)
Dept: SLEEP MEDICINE | Facility: MEDICAL CENTER | Age: 79
End: 2024-06-18
Attending: PHYSICIAN ASSISTANT
Payer: COMMERCIAL

## 2024-06-18 ENCOUNTER — OFFICE VISIT (OUTPATIENT)
Dept: SLEEP MEDICINE | Facility: MEDICAL CENTER | Age: 79
End: 2024-06-18
Attending: NURSE PRACTITIONER
Payer: COMMERCIAL

## 2024-06-18 VITALS
OXYGEN SATURATION: 93 % | BODY MASS INDEX: 22.54 KG/M2 | HEART RATE: 85 BPM | WEIGHT: 157.4 LBS | SYSTOLIC BLOOD PRESSURE: 124 MMHG | DIASTOLIC BLOOD PRESSURE: 84 MMHG | HEIGHT: 70 IN | RESPIRATION RATE: 14 BRPM

## 2024-06-18 DIAGNOSIS — G47.31 COMPLEX SLEEP APNEA SYNDROME: ICD-10-CM

## 2024-06-18 DIAGNOSIS — J96.11 CHRONIC RESPIRATORY FAILURE WITH HYPOXIA (HCC): ICD-10-CM

## 2024-06-18 DIAGNOSIS — Z87.891 FORMER SMOKER: ICD-10-CM

## 2024-06-18 DIAGNOSIS — J44.9 COPD MIXED TYPE (HCC): ICD-10-CM

## 2024-06-18 PROBLEM — G47.39 COMPLEX SLEEP APNEA SYNDROME: Chronic | Status: ACTIVE | Noted: 2021-01-19

## 2024-06-18 PROBLEM — G47.39 COMPLEX SLEEP APNEA SYNDROME: Status: ACTIVE | Noted: 2021-01-19

## 2024-06-18 PROCEDURE — 3074F SYST BP LT 130 MM HG: CPT | Performed by: NURSE PRACTITIONER

## 2024-06-18 PROCEDURE — 94761 N-INVAS EAR/PLS OXIMETRY MLT: CPT | Performed by: NURSE PRACTITIONER

## 2024-06-18 PROCEDURE — 3079F DIAST BP 80-89 MM HG: CPT | Performed by: NURSE PRACTITIONER

## 2024-06-18 PROCEDURE — 99213 OFFICE O/P EST LOW 20 MIN: CPT | Mod: 25 | Performed by: NURSE PRACTITIONER

## 2024-06-18 PROCEDURE — 99214 OFFICE O/P EST MOD 30 MIN: CPT | Performed by: NURSE PRACTITIONER

## 2024-06-18 RX ORDER — ALBUTEROL SULFATE 90 UG/1
2 AEROSOL, METERED RESPIRATORY (INHALATION) EVERY 4 HOURS PRN
Qty: 1 EACH | Refills: 5 | Status: SHIPPED | OUTPATIENT
Start: 2024-06-18

## 2024-06-18 RX ORDER — BUDESONIDE AND FORMOTEROL FUMARATE DIHYDRATE 160; 4.5 UG/1; UG/1
2 AEROSOL RESPIRATORY (INHALATION) 2 TIMES DAILY
Qty: 10.2 G | Refills: 5 | Status: SHIPPED | OUTPATIENT
Start: 2024-06-18

## 2024-06-18 RX ORDER — TIOTROPIUM BROMIDE INHALATION SPRAY 1.56 UG/1
1.25 SPRAY, METERED RESPIRATORY (INHALATION) DAILY
Qty: 4 G | Refills: 5 | Status: SHIPPED | OUTPATIENT
Start: 2024-06-18

## 2024-06-18 ASSESSMENT — PATIENT HEALTH QUESTIONNAIRE - PHQ9: CLINICAL INTERPRETATION OF PHQ2 SCORE: 0

## 2024-06-18 NOTE — PROCEDURES
Multi-Ox Readings  Multi Ox #1 Room air   O2 sat % at rest 93   O2 sat % on exertion     O2 sat average on exertion 86   Multi Ox #2 2 LPM   O2 sat % at rest 93   O2 sat % on exertion 91   O2 sat average on exertion       Oxygen Use     Oxygen Frequency     Duration of need     Is the patient mobile within the home?     CPAP Use?     BIPAP Use?     Servo Titration

## 2024-06-18 NOTE — PATIENT INSTRUCTIONS
Referral to [facility] - for continued care for CPAP with 2LPM O2    Restart oxygen 2LPM with activity and with CPAP at night    I will call [facility] to find out about getting CPAP back    Please contact [facility] and provide them with my information to order portable device    Follow up with me in 3 mos to check CPAP therapy

## 2024-06-18 NOTE — PROGRESS NOTES
Chief Complaint   Patient presents with    Follow-Up     Last Office Visit 3/1/2022 with Hermilo Mustafa AIMEE          HPI:  Bayron Parikh is a 79 y.o. year old male here today for follow-up on complex sleep apnea.  Last OV 3/1/22 Kris YU     Currently using APAP @ 13-20cm H20 nightly w/2LPM O2; RESMED; device obtained 2021.    Interval Events:  6/18/24:  Patient performed multi ox in office 1/19/21 and placed on daytime O2 2LPM with exertion and nocturnally. Patient recently lost O2 equipment due to no testing being updated with orders. He also had CPAP taken away by DME and unsure why. He wants to switch to [employer].  Mild COPD followed by PCP and using Symbicort, Spiriva and albuterol HFA prn. He notes having to switch to a new PCP through [facility] due to insurance and cannot be seen till 10/2024. He will also need referral for sleep medicine.  Insomnia followed by PCP on trazodone 200mg qhs.    Notes currently not having any appointment and concerned about his health.  He would like to pursue oxygen equipment through [facility].  He notes the last time use of CPAP the pressure to be quite high and difficult to tolerate as well.  He denies significant issues with sinuses but has a history of deviated septum and did not have corrected after seeing ENT.  He lives at home with his wife and is quite bit of family around.  She urged him to come to this appointment to have his issues resolved.    Sleep hx:  PSG split night study from 1/26/21 indicated severe complex sleep apnea with an AHI of 49.3.  Central apnea was evident at 24% during the diagnostic portion and 36% during the treatment portion of the study. Patient spent 109.1 minutes below 89% O2.  CPAP was started at a pressure of 4 cm H2O and titrated as high as 14 cm H2O. At 13 cmH2O the AHI was 0.0, min SpO2 was 89%, max was 93%.      PFT 12/9/21 noted mild obstruction with FEV1 2.13 L 72%, ratio 54, air trapping, %, TLC 99% DLCO 67% predicted.      ROS:  "As per HPI and otherwise negative if not stated.    Past Medical History:   Diagnosis Date    Anesthesia     pt  states he can not lie flat or he \"will stop breathing\" told his larynx is narrow    Apnea, sleep 04/07/2022    uses CPAP wears oxygen 2-3 L     Arthritis 04/07/2022    osteo hands, lumbar    Asthma     daily inhaler, wears oxygen 2-3 at night and prn with long distances , uses rescue inhaler once a day     Breath shortness 04/07/2022    uses O2, can not lie flat or \"I stop breathing\"    Daytime sleepiness     Dental disorder 04/07/2022    full dentures upper and lower    Emphysema of lung (HCC) 04/07/2022    COPD    Gasping for breath     Turkmen measles     Pain 04/07/2022    back    Pneumonia 2018    Psychiatric problem 04/07/2022    anxiety at times    Shingles 03/01/2018    Snoring 04/07/2022       Past Surgical History:   Procedure Laterality Date    LUMBAR LAMINECTOMY DISKECTOMY Left 4/14/2022    Procedure: LAMINECTOMY, SPINE, LUMBAR, WITH DISCECTOMY - L4;  Surgeon: Nikolas Dooley M.D.;  Location: [facility];  Service: Neurosurgery    LUMBAR DECOMPRESSION  4/14/2022    Procedure: DECOMPRESSION, SPINE, LUMBAR - POSTERIOR L3 TRANSPEDICULAR;  Surgeon: Nikolas Dooley M.D.;  Location: [facility];  Service: Neurosurgery    LUMBAR FUSION O-ARM  10/9/2019    Procedure: FUSION, SPINE, LUMBAR, WITH O-ARM IMAGING GUIDANCE- L4-5 TLIF;  Surgeon: Nikolas Dooley M.D.;  Location: [facility];  Service: Neurosurgery    LUMBAR LAMINECTOMY DISKECTOMY  10/9/2019    Procedure: LAMINECTOMY, SPINE, LUMBAR, WITH DISCECTOMY- REDO L3-S1 LAMI;  Surgeon: Nikolas Dooley M.D.;  Location: [facility];  Service: Neurosurgery    LUMBAR DECOMPRESSION  10/9/2019    Procedure: DECOMPRESSION, SPINE, LUMBAR- RIGHT L5 TRANSPEDICULAR;  Surgeon: Nikolas Dooley M.D.;  Location: [facility];  Service: Neurosurgery    LUMBAR LAMINECTOMY DISKECTOMY  3/14/2019    Procedure: LUMBAR LAMINECTOMY " DISKECTOMY- L4-5, MEDIAL FACETECTOMY;  Surgeon: Edmond Fung M.D.;  Location: [facility];  Service: Neurosurgery    FORAMINOTOMY Bilateral 3/14/2019    Procedure: FORAMINOTOMY;  Surgeon: Edmond Fung M.D.;  Location: [facility];  Service: Neurosurgery    TONSILLECTOMY         Family History   Problem Relation Age of Onset    Cancer Father         Esophogial/smoker    No Known Problems Mother     No Known Problems Brother     No Known Problems Son     No Known Problems Daughter     No Known Problems Daughter     No Known Problems Maternal Grandmother     No Known Problems Maternal Grandfather     No Known Problems Paternal Grandmother     No Known Problems Paternal Grandfather     Sleep Apnea Neg Hx        Social History     Socioeconomic History    Marital status:      Spouse name: Not on file    Number of children: Not on file    Years of education: Not on file    Highest education level: Not on file   Occupational History    Not on file   Tobacco Use    Smoking status: Former     Current packs/day: 0.00     Average packs/day: 1 pack/day for 39.0 years (39.0 ttl pk-yrs)     Types: Cigarettes     Start date: 1961     Quit date: 2000     Years since quittin.6    Smokeless tobacco: Never    Tobacco comments:     started smoking at age 16   Vaping Use    Vaping status: Never Used   Substance and Sexual Activity    Alcohol use: No    Drug use: No    Sexual activity: Yes     Partners: Female   Other Topics Concern    Not on file   Social History Narrative    Not on file     Social Determinants of Health     Financial Resource Strain: Not on File (2019)    Received from [facility]     Financial Resource Strain     Financial Resource Strain: 0   Food Insecurity: Not on File (2019)    Received from [facility]     Food Insecurity     Food: 0   Transportation Needs: Not on File (2019)    Received from [facility]     Transportation Needs     Transportation: 0  "  Physical Activity: Not on File (2019)    Received from [facility]     Physical Activity     Physical Activity: 0   Stress: Not on File (2019)    Received from [facility]     Stress     Stress: 0   Social Connections: Not on File (2019)    Received from [facility]     Social Connections     Social Connections and Isolation: 0   Intimate Partner Violence: Not on file   Housing Stability: Not on File (2019)    Received from [facility]     Housing Stability     Housin       Allergies as of 2024    (No Known Allergies)        Vitals:  /84 (BP Location: Left arm, Patient Position: Sitting, BP Cuff Size: Adult)   Pulse 85   Resp 14   Ht 1.778 m (5' 10\")   Wt 71.4 kg (157 lb 6.4 oz)   SpO2 93%     Current medications as of today   Current Outpatient Medications   Medication Sig Dispense Refill    Tiotropium Bromide Monohydrate (SPIRIVA RESPIMAT) 1.25 MCG/ACT Aero Soln Inhale 1.25 mcg every day. 4 g 5    budesonide-formoterol (SYMBICORT) 160-4.5 MCG/ACT Aerosol Inhale 2 Puffs 2 times a day. 6 g 5    fluocinonide (LIDEX) 0.05 % external solution APPLY ONCE DAILY TO ITCHING AREAS ON SCALP LINE FOR 2 WEEKS WHEN FLARING.      ketoconazole (NIZORAL) 2 % shampoo PLEASE SEE ATTACHED FOR DETAILED DIRECTIONS      albuterol 108 (90 Base) MCG/ACT Aero Soln inhalation aerosol Inhale 2 Puffs every four hours as needed for Shortness of Breath. 2 Each 5    traZODone (DESYREL) 100 MG Tab TAKE 2 TABLETS BY MOUTH EVERY EVENING 180 Tablet 3    triamcinolone acetonide (KENALOG) 0.1 % Ointment APPLY TO AFFECTED AREA TWICE A DAY 15 g 0    Misc. Devices Misc Overnight pulse oximetry to check for apnea episodes and low oxygen. 1 Each 11    oxyCODONE-acetaminophen (PERCOCET-10)  MG Tab Take 1 Tab by mouth every four hours as needed for Severe Pain.      tizanidine (ZANAFLEX) 4 MG Tab Take 1 Tab by mouth every 6 hours as needed. 30 Tab 0     No current facility-administered medications for this visit.         Physical " Exam:   Gen:           Alert and oriented, No apparent distress. Mood and affect appropriate, normal interaction with examiner.  Eyes:          PERRL, EOM intact, sclere white, conjunctive moist. Glasses.  Ears:          Not examined.   Hearing:     Grossly intact.  Nose:          Normal, no lesions or deformities.  Dentition:    Not examined.   Oropharynx:   Not examined.   Mallampati Classification: Not examined.   Neck:        Supple, trachea midline, no masses.  Respiratory Effort: No intercostal retractions or use of accessory muscles.   Lung Auscultation:      Clear to auscultation bilaterally; no rales, rhonchi or wheezing.  CV:            Regular rate and rhythm. No murmurs, rubs or gallops.  Abd:           Not examined.   Lymphadenopathy: Not examined.  Gait and Station: Cane  Digits and Nails: No clubbing, cyanosis, petechiae, or nodes.   Cranial Nerves: II-XII grossly intact.  Skin:        No rashes, lesions or ulcers noted.               Ext:           No cyanosis or edema.      Assessment:  1. Complex sleep apnea syndrome  Multiple Oximetry    Multiple Oximetry    DME Mask and Supplies    Referral to Other    DME O2 New Set Up    DME Other      2. BMI 22.0-22.9, adult        3. Former smoker        4. Chronic respiratory failure with hypoxia (HCC)  Multiple Oximetry    Multiple Oximetry    DME O2 New Set Up      5. COPD mixed type (HCC)  Tiotropium Bromide Monohydrate (SPIRIVA RESPIMAT) 1.25 MCG/ACT Aero Soln    budesonide-formoterol (SYMBICORT) 160-4.5 MCG/ACT Aerosol    albuterol 108 (90 Base) MCG/ACT Aero Soln inhalation aerosol    DME O2 New Set Up            Immunizations:    Flu:recommend fall 2024  Pneumovax 23:2013  Prevnar 13:2017  PCV 20: not due  COVID-19: 2023    Plan:  Patient's complex sleep apnea is not treated at all at this time.  The DME repossessed to CPAP and all oxygen equipment.  He did own his CPAP and obtained in 2021.  I will speak to DME so he can have his equipment  return.   DME mask/supplies   DME other; adjust to APAP 10-17cm    Referral [facility] sleep medicine  Multi ox in office requalify patient for oxygen therapy.  He will require 2 L/min with exertion and 2 L/min bleed and O2 with CPAP.  DME O2 through [facility] bleedin and daytime O2  History of COPD followed by primary care.  He is awaiting establishing with new care in [facility] and his current PCP has left for now.  He needs refills on bronchodilators to maintain till seen at new primary care appointment.  I will provide him refills for the next 5 months.   Refills  Encourage healthy diet regular activity for conditioning.  Follow-up in 3 months to reassess sleep apnea to verify patient is back on therapy, sooner if needed.  Advised patient to call [facility] to make a follow-up appointment for sleep medicine to establish with them as well.    Please note that this dictation was created using voice recognition software. I have made every reasonable attempt to correct obvious errors, but it is possible there are errors of grammar and possibly content that I did not discover before finalizing the note.

## 2024-06-20 ENCOUNTER — TELEPHONE (OUTPATIENT)
Dept: SLEEP MEDICINE | Facility: MEDICAL CENTER | Age: 79
End: 2024-06-20

## 2024-06-20 NOTE — TELEPHONE ENCOUNTER
Called pt to inform him that the order for the oxygen was filled out and sent to [facility]. I also informed the pt that we reach out to [facility] and was informed that they didn't  the pt cpap machine. Per Efrain at [facility] she stated that the only thing they took back was the oxygen  concentrator and everything that goes with that but not the cpap machine.     After informing the pt of this he stated that [facility] did take his cpap machine. I advise the pt to give [facility] a call to inform them of this and to request his machine back since it belongs to him. Pt stated that he will probably will not see that machine again. I advise the pt to give them a call again and informed him to contact us with what they say.

## 2024-07-03 ENCOUNTER — TELEPHONE (OUTPATIENT)
Dept: SLEEP MEDICINE | Facility: MEDICAL CENTER | Age: 79
End: 2024-07-03

## 2024-07-03 DIAGNOSIS — G47.31 COMPLEX SLEEP APNEA SYNDROME: ICD-10-CM

## 2024-07-03 NOTE — TELEPHONE ENCOUNTER
Patient called and stated he doesn't have device, advised he will need to complete a sleep study. If order can be re-routed so he can get it done through us, we do take his insurance since it's a [employer] Plan, thank you

## 2024-07-09 NOTE — TELEPHONE ENCOUNTER
Called pt and schedule in lab SS on 7/31/2024. Pt asked if at the sleep lab he would be getting something to sleep and informed pt no. Pt stated that he will need a sleep aid. Informed pt that I will be forwarding message to Mercedes YU to review.

## 2024-07-10 ENCOUNTER — PATIENT MESSAGE (OUTPATIENT)
Dept: SLEEP MEDICINE | Facility: MEDICAL CENTER | Age: 79
End: 2024-07-10
Payer: COMMERCIAL

## 2024-07-10 DIAGNOSIS — G47.31 COMPLEX SLEEP APNEA SYNDROME: ICD-10-CM

## 2024-07-10 NOTE — TELEPHONE ENCOUNTER
Called pt to relay Mercedes YU message   Patient is currently on trazodone - he need to bring this and take when prompted by technician          Pt stated to me that he hasn't been on Trazodone now for a year

## 2024-07-11 RX ORDER — ZOLPIDEM TARTRATE 5 MG/1
5 TABLET ORAL NIGHTLY PRN
Qty: 2 TABLET | Refills: 0 | Status: SHIPPED | OUTPATIENT
Start: 2024-07-11 | End: 2024-07-31

## 2024-07-24 ENCOUNTER — APPOINTMENT (OUTPATIENT)
Dept: SLEEP MEDICINE | Facility: MEDICAL CENTER | Age: 79
End: 2024-07-24
Payer: COMMERCIAL

## 2024-07-31 ENCOUNTER — SLEEP STUDY (OUTPATIENT)
Dept: SLEEP MEDICINE | Facility: MEDICAL CENTER | Age: 79
End: 2024-07-31
Attending: NURSE PRACTITIONER
Payer: COMMERCIAL

## 2024-07-31 DIAGNOSIS — G47.31 COMPLEX SLEEP APNEA SYNDROME: ICD-10-CM

## 2024-07-31 PROCEDURE — 95811 POLYSOM 6/>YRS CPAP 4/> PARM: CPT | Performed by: STUDENT IN AN ORGANIZED HEALTH CARE EDUCATION/TRAINING PROGRAM

## 2024-08-01 NOTE — PROCEDURES
Patient: JAMES LEMUS  ID: [id number] Date: 7/31/2024   MONTAGE: Standard  STUDY TYPE: Split Night  RECORDING TECHNIQUE:   After the scalp was prepared, gold plated electrodes were applied to the scalp according to the International 10-20 System. EEG (electroencephalogram) was continuously monitored from the O1-M2, O2-M1, C3-M2, C4-M1, F3-M2, and F4-M1. EOGs (electrooculograms) were monitored by electrodes placed at the left and right outer canthi. Chin EMG (electromyogram) was monitored by electrodes placed on the mentalis and sub-mentalis muscles. Nasal and oral airflow were monitored using a triple port thermocouple as well as oronasal pressure transducer. Respiratory effort was measured by inductive plethysmography technology employing abdominal and thoracic belts. Blood oxygen saturation and pulse were monitored by pulse oximetry. Heart rhythm was monitored by surface electrocardiogram. Leg EMG was studied using surface electrodes placed on left and right anterior tibialis. A microphone was used to monitor tracheal sounds and snoring. Body position was monitored and documented by technician observation.   SCORING CRITERIA:   A modification of the AASM manual for scoring of sleep and associated events was used. Obstructive apneas were scored by cessation of airflow for at least 10 seconds with continuing respiratory effort. Central apneas were scored by cessation of airflow for at least 10 seconds with no respiratory effort. Hypopneas were scored by a 30% or more reduction in airflow for at least 10 seconds accompanied by arterial oxygen desaturation of 3% or an arousal. For CMS (Medicare) patients, per AASM rule 1B, hypopneas are scored by 30% with mild reduction in airflow for at least 10 seconds accompanied by arterial saturation decreased at 4%.    DIAGNOSTIC  Study start time was 09:40:37 PM. Diagnostic recording time was 219 minutes with a total sleep time of 139 minutes resulting in a sleep efficiency of  63.55%%. Sleep latency from the start of the study was 12 minutes and the latency from sleep to REM was 00 minutes. In total,121 arousals were scored for an arousal index of 52.0.  Respiratory:  There were a total of 170 apneas consisting of 112 obstructive apneas, 0 mixed apneas, and 58 central apneas. A total of 15 hypopneas were scored. The apnea index was 73.12 per hour and the hypopnea index was 6.45 per hour resulting in an overall 4% AHI of 79.57. AHI during REM was 0.0 and AHI while supine was 79.57.  Central apneas accounted for 31.4% of total respiratory events  Oximetry:  There was a mean oxygen saturation of 91.0%. The minimum oxygen saturation during NREM sleep was 68.0% and in REM was --%. Time spent during sleep with oxygen saturations <88% was 33.0 minutes.   Cardiac:  The highest heart rate seen while awake was 93 BPM while the highest heart rate during sleep was 82 BPM with an average sleeping heart rate of 65 BPM.  Limb Movements:  There were a total of 0 PLMs during sleep, which resulted in a PLM index of 0.0. There were 0 PLMs associated with arousals which resulted in a PLMS arousal index of 0.0.    TREATMENT:  Treatment recording time was 3h 10.0m (190 minutes) with a total sleep time of 2h 51.5m (171 minutes) resulting in a sleep efficiency of 90.3%. Sleep latency from the start of treatment was 00 minutes and REM latency from sleep onset was 1h 25.5m. The patient had 44 arousals in total for an arousal index of 15.4.  Respiratory:   There were 13 apneas in total consisting of 12 obstructive apneas, 1 central apneas, and 0 mixed apneas for an apnea index of 4.55. The patient had 46 hypopneas in total, which resulted in a hypopnea index of 16.09. The overall 4% AHI was 20.64, with a REM AHI of 5.00, and a supine AHI of 20.64.   Oximetry:  The mean SaO2 during treatment was 93.0%. The minimum oxygen saturation in NREM was 83.0 % and in REM was 92.0%. Patient spent 5.6 minutes of TST with SaO2  <88%.  Cardiac:  The highest heart rate during sleep was 75 BPM with an average sleeping heart rate of 64BPM.  Limb Movements:  There were a total of 0 PLMS during titration sleep time that resulted in an index of 0.0. There were 1 PLMS associated with arousals. This resulted in a PLM arousal index of 0.3.  Titration:   CPAP was tried from 5 to 16. BiPAP was tried at 18/14  This was a fully attended sleep study. This test was technically adequate. This patient was titrated on CPAP starting at 5 cm of water pressure. Patient was titrated up to BiPAP 18/14 cm of water pressure. Patient did best at BiPAP 18/14 cm of water pressure. Patient spent 28 minutes at that pressure and the AHI was 3.2 which is considered treated sleep apnea.     Impression:  1.  Severe obstructive sleep apnea with an overall 4% AHI of 79.6 events an hour  2.  Nocturnal hypoxia secondary to untreated sleep apnea with time at or below 80% saturation of 33 minutes during diagnostic portion  3.  Patient met criteria for split-night protocol and was placed on CPAP and BiPAP therapy  4.  At increasing PAP pressure patient did have improvement in respiratory events  5.  Oxygen saturations appear to improve with BiPAP therapy compared to CPAP therapy  6.  Supine REM sleep was seen on PAP therapy    Recommendations:  I recommend auto BiPAP EPAP 14 IPAP 18 pressure support 4 cmH2O.  Patient used a large AirFit F20 mask during night of study.    I also recommend 30 day compliance download to assess the efficacy to the recommended pressure, measure leak, apnea hypopnea index and compliance for further outpatient monitoring and management of PAP therapy. In some cases alternative treatment options may be proven effective in resolving sleep apnea. These options include upper airway surgery, the use of a dental orthotic, weight loss, or positional therapy. Clinical correlation is required. In general patients with sleep apnea are advised to avoid alcohol,  sedatives and not to operate a motor vehicle while drowsy.  Untreated sleep apnea increases the risk for cardiovascular and neurovascular disease.

## 2024-09-18 ENCOUNTER — OFFICE VISIT (OUTPATIENT)
Dept: SLEEP MEDICINE | Facility: MEDICAL CENTER | Age: 79
End: 2024-09-18
Attending: NURSE PRACTITIONER
Payer: COMMERCIAL

## 2024-09-18 VITALS
WEIGHT: 160 LBS | HEIGHT: 68 IN | BODY MASS INDEX: 24.25 KG/M2 | OXYGEN SATURATION: 90 % | RESPIRATION RATE: 14 BRPM | HEART RATE: 104 BPM | DIASTOLIC BLOOD PRESSURE: 62 MMHG | SYSTOLIC BLOOD PRESSURE: 120 MMHG

## 2024-09-18 DIAGNOSIS — G47.31 COMPLEX SLEEP APNEA SYNDROME: Chronic | ICD-10-CM

## 2024-09-18 DIAGNOSIS — Z87.891 FORMER SMOKER: ICD-10-CM

## 2024-09-18 PROCEDURE — 3078F DIAST BP <80 MM HG: CPT | Performed by: NURSE PRACTITIONER

## 2024-09-18 PROCEDURE — 99212 OFFICE O/P EST SF 10 MIN: CPT | Performed by: NURSE PRACTITIONER

## 2024-09-18 PROCEDURE — 3074F SYST BP LT 130 MM HG: CPT | Performed by: NURSE PRACTITIONER

## 2024-09-18 PROCEDURE — 99213 OFFICE O/P EST LOW 20 MIN: CPT | Performed by: NURSE PRACTITIONER

## 2024-09-18 NOTE — PATIENT INSTRUCTIONS
Call medical equipment company in 3 business days to check on getting BIPAP device      Start using BIPAP every night more than 4hrs - message me on mychart if pressure is too high or uncomfortable    Continue oxygen 2lPM with exertion    Patient understands the need to use device every night for >4hrs to meet compliance standards for insurance purposes.  Patient understands they may have mask fits within first 30 days of therapy covered by insurance to obtain best fit.  Recommend cleaning mask, water reservoir, and tubing at least once per week with soap and water and wiping down mask with wash cloth or baby wipe once daily to remove oil from face to improve seal. Change filters per  recommendations; every 2-4 weeks.    Recommendations for dry mouth:  1.  Increase humidity on PAP machine  2.  Biotene or Spry toothpaste/mouthwash/spray  3.  XyliMelt lozenges  4.  Sleep strips mouth tape available on amazon.com  5.  Chin strap from [employer] or request from medical equipment company (call them for this)  6.  Increase fluid intake    Sleep hygiene discussed.  Recommend keeping a set sleep/wake schedule.  Long enough hours of sleep.  Limiting/avoiding naps.  No caffeine afternoon and no heavy meals in the evening.  Recommend daily exercise.

## 2024-09-18 NOTE — PROGRESS NOTES
Chief Complaint   Patient presents with    Follow-Up     LOV 6/18/24 LAUREL Mustafa    7/31/24         HPI:  Bayron Parikh is a 79 y.o. year old male here today for follow-up on sleep study results.  Last OV 6/18/24     Not on PAP therapy. Previously using APAP 13-20cm w/2LPM O2.    PSG split night 7/31/24:  Impression:  1.  Severe obstructive sleep apnea with an overall 4% AHI of 79.6 events an hour  2.  Nocturnal hypoxia secondary to untreated sleep apnea with time at or below 80% saturation of 33 minutes during diagnostic portion  3.  Patient met criteria for split-night protocol and was placed on CPAP and BiPAP therapy  4.  At increasing PAP pressure patient did have improvement in respiratory events  5.  Oxygen saturations appear to improve with BiPAP therapy compared to CPAP therapy  6.  Supine REM sleep was seen on PAP therapy     Recommendations:  I recommend auto BiPAP EPAP 14 IPAP 18 pressure support 4 cmH2O.  Patient used a large AirFit F20 mask during night of study.  Reviewed with patient.      Interval Events:  9/18/24: Patient presents today noting he established with primary care and did obtain oxygen on exertion and nocturnally through [employer].  He is interested in starting PAP therapy again.  He denies any changes in health since last office visit.  6/18/24: Patient performed multi ox in office 1/19/21 and placed on daytime O2 2LPM with exertion and nocturnally. Patient recently lost O2 equipment due to no testing being updated with orders. He also had CPAP taken away by DME and unsure why. He wants to switch to [employer].  Mild COPD followed by PCP and using Symbicort, Spiriva and albuterol HFA prn. He notes having to switch to a new PCP through [facility] due to insurance and cannot be seen till 10/2024. He will also need referral for sleep medicine.  Insomnia followed by PCP on trazodone 200mg qhs.     Notes currently not having any appointment and concerned about his health.  He would like to pursue  "oxygen equipment through [employer].  He notes the last time use of CPAP the pressure to be quite high and difficult to tolerate as well.  He denies significant issues with sinuses but has a history of deviated septum and did not have corrected after seeing ENT.  He lives at home with his wife and is quite bit of family around.  She urged him to come to this appointment to have his issues resolved.     Sleep hx:  PSG split night study from 1/26/21 indicated severe complex sleep apnea with an AHI of 49.3.  Central apnea was evident at 24% during the diagnostic portion and 36% during the treatment portion of the study. Patient spent 109.1 minutes below 89% O2.  CPAP was started at a pressure of 4 cm H2O and titrated as high as 14 cm H2O. At 13 cmH2O the AHI was 0.0, min SpO2 was 89%, max was 93%.       PFT 12/9/21 noted mild obstruction with FEV1 2.13 L 72%, ratio 54, air trapping, %, TLC 99% DLCO 67% predicted.      ROS: As per HPI and otherwise negative if not stated.    Past Medical History:   Diagnosis Date    Anesthesia     pt  states he can not lie flat or he \"will stop breathing\" told his larynx is narrow    Apnea, sleep 04/07/2022    uses CPAP wears oxygen 2-3 L     Arthritis 04/07/2022    osteo hands, lumbar    Asthma     daily inhaler, wears oxygen 2-3 at night and prn with long distances , uses rescue inhaler once a day     Breath shortness 04/07/2022    uses O2, can not lie flat or \"I stop breathing\"    Daytime sleepiness     Dental disorder 04/07/2022    full dentures upper and lower    Emphysema of lung (HCC) 04/07/2022    COPD    Gasping for breath     Serbian measles     Pain 04/07/2022    back    Pneumonia 2018    Psychiatric problem 04/07/2022    anxiety at times    Shingles 03/01/2018    Snoring 04/07/2022       Past Surgical History:   Procedure Laterality Date    LUMBAR LAMINECTOMY DISKECTOMY Left 4/14/2022    Procedure: LAMINECTOMY, SPINE, LUMBAR, WITH DISCECTOMY - L4;  Surgeon: Nikolas Dooley, " M.D.;  Location: [facility];  Service: Neurosurgery    LUMBAR DECOMPRESSION  4/14/2022    Procedure: DECOMPRESSION, SPINE, LUMBAR - POSTERIOR L3 TRANSPEDICULAR;  Surgeon: Nikolas Dooley M.D.;  Location: SURGERY [facility];  Service: Neurosurgery    LUMBAR FUSION O-ARM  10/9/2019    Procedure: FUSION, SPINE, LUMBAR, WITH O-ARM IMAGING GUIDANCE- L4-5 TLIF;  Surgeon: Nikolas Dooley M.D.;  Location: [facility];  Service: Neurosurgery    LUMBAR LAMINECTOMY DISKECTOMY  10/9/2019    Procedure: LAMINECTOMY, SPINE, LUMBAR, WITH DISCECTOMY- REDO L3-S1 LAMI;  Surgeon: Nikolas Dooley M.D.;  Location: [facility];  Service: Neurosurgery    LUMBAR DECOMPRESSION  10/9/2019    Procedure: DECOMPRESSION, SPINE, LUMBAR- RIGHT L5 TRANSPEDICULAR;  Surgeon: Nikolas Dooley M.D.;  Location: [facility];  Service: Neurosurgery    LUMBAR LAMINECTOMY DISKECTOMY  3/14/2019    Procedure: LUMBAR LAMINECTOMY DISKECTOMY- L4-5, MEDIAL FACETECTOMY;  Surgeon: Edmond Fung M.D.;  Location: [facility];  Service: Neurosurgery    FORAMINOTOMY Bilateral 3/14/2019    Procedure: FORAMINOTOMY;  Surgeon: Edmond Fung M.D.;  Location: [facility];  Service: Neurosurgery    TONSILLECTOMY         Family History   Problem Relation Age of Onset    Cancer Father         Esophogial/smoker    No Known Problems Mother     No Known Problems Brother     No Known Problems Son     No Known Problems Daughter     No Known Problems Daughter     No Known Problems Maternal Grandmother     No Known Problems Maternal Grandfather     No Known Problems Paternal Grandmother     No Known Problems Paternal Grandfather     Sleep Apnea Neg Hx        Social History     Socioeconomic History    Marital status:      Spouse name: Not on file    Number of children: Not on file    Years of education: Not on file    Highest education level: Not on file   Occupational History    Not on file   Tobacco Use  "   Smoking status: Former     Current packs/day: 0.00     Average packs/day: 1 pack/day for 39.0 years (39.0 ttl pk-yrs)     Types: Cigarettes     Start date: 1961     Quit date: 2000     Years since quittin.8    Smokeless tobacco: Never    Tobacco comments:     started smoking at age 16   Vaping Use    Vaping status: Never Used   Substance and Sexual Activity    Alcohol use: No    Drug use: No    Sexual activity: Yes     Partners: Female   Other Topics Concern    Not on file   Social History Narrative    Not on file     Social Determinants of Health     Financial Resource Strain: Not on File (2019)    Received from [facility]    Financial Resource Strain     Financial Resource Strain: 0   Food Insecurity: Not on File (2019)    Received from [facility]    Food Insecurity     Food: 0   Transportation Needs: Not on File (2019)    Received from [facility]    Transportation Needs     Transportation: 0   Physical Activity: Not on File (2019)    Received from [facility]    Physical Activity     Physical Activity: 0   Stress: Not on File (2019)    Received from [facility]    Stress     Stress: 0   Social Connections: Not on File (2019)    Received from [facility]    Social Connections     Social Connections and Isolation: 0   Intimate Partner Violence: Not on file   Housing Stability: Not on File (2019)    Received from [facility]    Housing Stability     Housin       Allergies as of 2024    (No Known Allergies)        Vitals:  /62 (BP Location: Left arm, Patient Position: Sitting, BP Cuff Size: Adult)   Pulse (!) 104   Resp 14   Ht 1.727 m (5' 8\")   Wt 72.6 kg (160 lb)   SpO2 90%     Current medications as of today   Current Outpatient Medications   Medication Sig Dispense Refill    Tiotropium Bromide Monohydrate (SPIRIVA RESPIMAT) 1.25 MCG/ACT Aero Soln Inhale 1.25 mcg every day. 4 g 5    budesonide-formoterol (SYMBICORT) 160-4.5 MCG/ACT " Aerosol Inhale 2 Puffs 2 times a day. 10.2 g 5    albuterol 108 (90 Base) MCG/ACT Aero Soln inhalation aerosol Inhale 2 Puffs every four hours as needed for Shortness of Breath. 1 Each 5    fluocinonide (LIDEX) 0.05 % external solution APPLY ONCE DAILY TO ITCHING AREAS ON SCALP LINE FOR 2 WEEKS WHEN FLARING.      ketoconazole (NIZORAL) 2 % shampoo PLEASE SEE ATTACHED FOR DETAILED DIRECTIONS      traZODone (DESYREL) 100 MG Tab TAKE 2 TABLETS BY MOUTH EVERY EVENING 180 Tablet 3    triamcinolone acetonide (KENALOG) 0.1 % Ointment APPLY TO AFFECTED AREA TWICE A DAY 15 g 0    Misc. Devices Misc Overnight pulse oximetry to check for apnea episodes and low oxygen. 1 Each 11    oxyCODONE-acetaminophen (PERCOCET-10)  MG Tab Take 1 Tab by mouth every four hours as needed for Severe Pain.      tizanidine (ZANAFLEX) 4 MG Tab Take 1 Tab by mouth every 6 hours as needed. 30 Tab 0     No current facility-administered medications for this visit.         Physical Exam:   Gen:           Alert and oriented, No apparent distress. Mood and affect appropriate, normal interaction with examiner.  Eyes:          PERRL, EOM intact, sclere white, conjunctive moist. Glasses.  Ears:          Not examined.   Hearing:     Grossly intact.  Nose:          Normal, no lesions or deformities.  Dentition:    Not examined.   Oropharynx:   Not examined.   Mallampati Classification: Not examined.   Neck:        Supple, trachea midline, no masses.  Respiratory Effort: No intercostal retractions or use of accessory muscles.   Lung Auscultation:      Clear to auscultation bilaterally; no rales, rhonchi or wheezing.  CV:            Regular rate and rhythm. No murmurs, rubs or gallops.  Abd:           Not examined.   Lymphadenopathy: Not examined.  Gait and Station: Normal.  Digits and Nails: No clubbing, cyanosis, petechiae, or nodes.   Cranial Nerves: II-XII grossly intact.  Skin:        No rashes, lesions or ulcers noted.               Ext:            No cyanosis or edema.      Assessment:  1. Complex sleep apnea syndrome  DME BiPAP      2. BMI 24.0-24.9, adult        3. Former smoker            Immunizations:    Flu:recommend fall 2024  Pneumovax 23:2013  Prevnar 13:2017  PCV 20: not due  COVID-19: 2023    Plan:  Patient has complex sleep apnea and did best with BiPAP during sleep study.  Oxygen levels were also satisfactory on BiPAP.  We will place orders to reinitiate therapy.  He will not need bleed and O2.  He will continue daytime oxygen with exertion only. Reviewed compliance measures with patient and need to use >4hrs each night and to contact me immediately via [employer] if having intolerance issues.   DME BiPAP  Encourage healthy diet regular activity for conditioning.  Follow-up in 4 months for compliance check on new device, sooner if needed.    Please note that this dictation was created using voice recognition software. I have made every reasonable attempt to correct obvious errors, but it is possible there are errors of grammar and possibly content that I did not discover before finalizing the note.

## 2024-10-10 ENCOUNTER — TELEPHONE (OUTPATIENT)
Dept: HEALTH INFORMATION MANAGEMENT | Facility: OTHER | Age: 79
End: 2024-10-10
Payer: COMMERCIAL

## 2024-10-16 ENCOUNTER — PATIENT MESSAGE (OUTPATIENT)
Dept: SLEEP MEDICINE | Facility: MEDICAL CENTER | Age: 79
End: 2024-10-16
Payer: COMMERCIAL

## 2025-01-23 ENCOUNTER — TELEMEDICINE (OUTPATIENT)
Dept: SLEEP MEDICINE | Facility: MEDICAL CENTER | Age: 80
End: 2025-01-23
Attending: PHYSICIAN ASSISTANT
Payer: COMMERCIAL

## 2025-01-23 VITALS — WEIGHT: 160 LBS | BODY MASS INDEX: 24.25 KG/M2 | HEIGHT: 68 IN

## 2025-01-23 DIAGNOSIS — G47.39 COMPLEX SLEEP APNEA SYNDROME: ICD-10-CM

## 2025-01-23 PROCEDURE — 99213 OFFICE O/P EST LOW 20 MIN: CPT | Performed by: PHYSICIAN ASSISTANT

## 2025-01-23 ASSESSMENT — ENCOUNTER SYMPTOMS
WEIGHT LOSS: 0
SPUTUM PRODUCTION: 1
FEVER: 0
COUGH: 1
SORE THROAT: 0
SINUS PAIN: 0
INSOMNIA: 1
HEADACHES: 0
ORTHOPNEA: 1
DIZZINESS: 0
HEARTBURN: 0
TREMORS: 1
PALPITATIONS: 0
WHEEZING: 0
CHILLS: 0
SHORTNESS OF BREATH: 1

## 2025-01-23 NOTE — PROGRESS NOTES
"Virtual Visit: Established Patient   This visit was conducted via Teams using secure and encrypted videoconferencing technology.   The patient was in their home in the [facility].    The patient's identity was confirmed and verbal consent was obtained for this virtual visit.     Subjective:     Chief Complaint   Patient presents with    Apnea     Last Office Visit 9/18/24 with Mercedes Mustafa    PAP/O2/OAT: auto BiPAP EPAP 14 IPAP 18 pressure support 4 cmH2O       HPI:  Bayron Parikh is a 79 y.o. year old male here today for follow-up on complex sleep apnea.  Last seen in clinic 9/18/2024 by AIMEE Molina.  Previously evaluated by Dr. Dl Vazquez 1/19/2021.    Past Medical History: Complex sleep apnea, eczema, insomnia, post herpetic neuralgia, ED, COPD, former smoker, chronic pain syndrome, lumbar spinal stenosis.    Vitals:  Ht 1.727 m (5' 8\")   Wt 72.6 kg (160 lb)   BMI 24.33 kg/m²     Recent Imaging: None    Currently using  Resmed auto Bi-PAP @18/14/4 cm H20 pressure; compliance reviewed for 10/4/2024 through 11/2/2024, days used 27/30, average daily usage 4 hours 46 minutes, 70% of days greater than or equal to 4 hours, mask leak at 25.7 LPM at 95th percentile, AHI 2.8 per hour.  See media for full report.    Device obtained 9/29/2024  DME provider [facility]  Mask interface hybrid fullface mask    Polysomnogram obtained 7/31/2024 demonstrating findings consistent with severe sleep apnea with overall AHI of 79.57 events per hour with central apneas accounting for 31% of total respiratory events.  Low O2 sat of 68% with sats less than or equal to 88% for 33 minutes.  Patient was trialed on CPAP and BiPAP therapy and did best on BiPAP therapy with recommendation for auto BiPAP at 18/14/4 centimeters H2O pressure.  Large AirFit F20 mask was recommended however patient prefers nasal pillows.    Previous sleep study was obtained 1/26/2021 with findings consistent with severe sleep apnea with overall " "AHI of 49.3 events per hour and 24% of the apneas were central.  Low O2 sat of 58% with mean of 85% during diagnostic portion.  Patient was trialed on CPAP with recommendation for CPAP of 13 cm H2O pressure with fullface mask and consideration of bleed in O2.    Sleep schedule goes to bed 1-2 a.m. and wakens 4-6 a.m. but often goes back to sleep at 830 for 2 hours   Symptoms denies day time somnolence and denies morning headache      Review of Systems   Constitutional:  Negative for chills, fever, malaise/fatigue and weight loss.   HENT:  Positive for congestion (current cold). Negative for hearing loss, nosebleeds, sinus pain, sore throat and tinnitus.    Eyes:         Presc glasses    Respiratory:  Positive for cough, sputum production (yellow was green) and shortness of breath. Negative for wheezing (controlled with inhaler).    Cardiovascular:  Positive for orthopnea (mild elevation). Negative for chest pain, palpitations and leg swelling.   Gastrointestinal:  Negative for heartburn.        Upper and partial dentures, no swallowing issues    Neurological:  Positive for tremors (mild). Negative for dizziness and headaches.   Psychiatric/Behavioral:  The patient has insomnia (falling and staying asleep).        Past Medical History:   Diagnosis Date    Anesthesia     pt  states he can not lie flat or he \"will stop breathing\" told his larynx is narrow    Apnea, sleep 04/07/2022    uses CPAP wears oxygen 2-3 L     Arthritis 04/07/2022    osteo hands, lumbar    Asthma     daily inhaler, wears oxygen 2-3 at night and prn with long distances , uses rescue inhaler once a day     Breath shortness 04/07/2022    uses O2, can not lie flat or \"I stop breathing\"    Daytime sleepiness     Dental disorder 04/07/2022    full dentures upper and lower    Emphysema of lung (HCC) 04/07/2022    COPD    Gasping for breath     Salvadorean measles     Pain 04/07/2022    back    Pneumonia 2018    Psychiatric problem 04/07/2022    anxiety at " times    Shingles 03/01/2018    Snoring 04/07/2022       Past Surgical History:   Procedure Laterality Date    LUMBAR LAMINECTOMY DISKECTOMY Left 4/14/2022    Procedure: LAMINECTOMY, SPINE, LUMBAR, WITH DISCECTOMY - L4;  Surgeon: Nikolas Dooley M.D.;  Location: [facility];  Service: Neurosurgery    LUMBAR DECOMPRESSION  4/14/2022    Procedure: DECOMPRESSION, SPINE, LUMBAR - POSTERIOR L3 TRANSPEDICULAR;  Surgeon: Nikolas Dooley M.D.;  Location: [facility];  Service: Neurosurgery    LUMBAR FUSION O-ARM  10/9/2019    Procedure: FUSION, SPINE, LUMBAR, WITH O-ARM IMAGING GUIDANCE- L4-5 TLIF;  Surgeon: Nikolas Dooley M.D.;  Location: [facility];  Service: Neurosurgery    LUMBAR LAMINECTOMY DISKECTOMY  10/9/2019    Procedure: LAMINECTOMY, SPINE, LUMBAR, WITH DISCECTOMY- REDO L3-S1 LAMI;  Surgeon: Nikolas Dooley M.D.;  Location: [facility];  Service: Neurosurgery    LUMBAR DECOMPRESSION  10/9/2019    Procedure: DECOMPRESSION, SPINE, LUMBAR- RIGHT L5 TRANSPEDICULAR;  Surgeon: Nikolas Dooley M.D.;  Location: [facility];  Service: Neurosurgery    LUMBAR LAMINECTOMY DISKECTOMY  3/14/2019    Procedure: LUMBAR LAMINECTOMY DISKECTOMY- L4-5, MEDIAL FACETECTOMY;  Surgeon: Edmond Fung M.D.;  Location: [facility];  Service: Neurosurgery    FORAMINOTOMY Bilateral 3/14/2019    Procedure: FORAMINOTOMY;  Surgeon: Edmond Fung M.D.;  Location: [facility];  Service: Neurosurgery    TONSILLECTOMY         Family History   Problem Relation Age of Onset    Cancer Father         Esophogial/smoker    No Known Problems Mother     No Known Problems Brother     No Known Problems Son     No Known Problems Daughter     No Known Problems Daughter     No Known Problems Maternal Grandmother     No Known Problems Maternal Grandfather     No Known Problems Paternal Grandmother     No Known Problems Paternal Grandfather     Sleep Apnea Neg Hx        Social  History     Socioeconomic History    Marital status:      Spouse name: Not on file    Number of children: Not on file    Years of education: Not on file    Highest education level: Not on file   Occupational History    Not on file   Tobacco Use    Smoking status: Former     Current packs/day: 0.00     Average packs/day: 1 pack/day for 39.0 years (39.0 ttl pk-yrs)     Types: Cigarettes     Start date: 1961     Quit date: 2000     Years since quittin.2    Smokeless tobacco: Never    Tobacco comments:     started smoking at age 16   Vaping Use    Vaping status: Never Used   Substance and Sexual Activity    Alcohol use: No    Drug use: No    Sexual activity: Yes     Partners: Female   Other Topics Concern    Not on file   Social History Narrative    Not on file     Social Drivers of Health     Financial Resource Strain: Not on File (2019)    Received from [facility]    Financial Resource Strain     Financial Resource Strain: 0   Food Insecurity: Not on File (2019)    Received from [facility]    Food Insecurity     Food: 0   Transportation Needs: Not on File (2019)    Received from [facility]    Transportation Needs     Transportation: 0   Physical Activity: Not on File (2019)    Received from [facility]    Physical Activity     Physical Activity: 0   Stress: Not on File (2019)    Received from [facility]    Stress     Stress: 0   Social Connections: Not on File (2019)    Received from [facility]    Social Connections     Social Connections and Isolation: 0   Intimate Partner Violence: Not on file   Housing Stability: Not on File (2019)    Received from [facility]    Housing Stability     Housin       Allergies as of 2025    (No Known Allergies)          Current medications as of today   Current Outpatient Medications   Medication Sig Dispense Refill    Tiotropium Bromide Monohydrate (SPIRIVA RESPIMAT) 1.25 MCG/ACT Aero Soln Inhale 1.25 mcg  every day. 4 g 5    budesonide-formoterol (SYMBICORT) 160-4.5 MCG/ACT Aerosol Inhale 2 Puffs 2 times a day. 10.2 g 5    albuterol 108 (90 Base) MCG/ACT Aero Soln inhalation aerosol Inhale 2 Puffs every four hours as needed for Shortness of Breath. 1 Each 5    fluocinonide (LIDEX) 0.05 % external solution APPLY ONCE DAILY TO ITCHING AREAS ON SCALP LINE FOR 2 WEEKS WHEN FLARING.      ketoconazole (NIZORAL) 2 % shampoo PLEASE SEE ATTACHED FOR DETAILED DIRECTIONS      traZODone (DESYREL) 100 MG Tab TAKE 2 TABLETS BY MOUTH EVERY EVENING 180 Tablet 3    triamcinolone acetonide (KENALOG) 0.1 % Ointment APPLY TO AFFECTED AREA TWICE A DAY 15 g 0    Misc. Devices Misc Overnight pulse oximetry to check for apnea episodes and low oxygen. 1 Each 11    oxyCODONE-acetaminophen (PERCOCET-10)  MG Tab Take 1 Tab by mouth every four hours as needed for Severe Pain.      tizanidine (ZANAFLEX) 4 MG Tab Take 1 Tab by mouth every 6 hours as needed. 30 Tab 0     No current facility-administered medications for this visit.          Objective:   Physical Exam:  Constitutional: Alert, no distress, well-groomed.  Skin: No rashes in visible areas.  Eye: Round. Conjunctiva clear, lids normal. No icterus.   ENMT: Lips pink without lesions, good dentition, moist mucous membranes. Phonation normal.  Neck: No masses, no thyromegaly. Moves freely without pain.  Respiratory: Unlabored respiratory effort, no cough or audible wheeze  Psych: Alert and oriented x3, normal affect and mood.     Assessment and Plan:   The following treatment plan was discussed:     1. Complex sleep apnea syndrome  - DME Mask and Supplies    Reviewed compliance, demonstrating use and benefit.  Patient met first compliance requirements.  He is experiencing a moderate mask leak with nasal pillows.  Advised to switch to hybrid fullface mask.  Order sent for mask and supplies to [facility].  Patient was reminded to use distilled water only in humidifier chamber with fresh fill  daily, reviewed equipment cleaning as well as replacement schedule.  Short-term follow-up to reassess for improvement in mask leak and tolerance on hybrid interface.  Patient understands he will need to bring the chip in prior to virtual visit as wireless download is not available.  Patient prefers virtual visit which is acceptable for his level of care.    Follow-up:   Return in about 4 months (around 5/23/2025) for Return with Isabell Bran PA-C.

## 2025-01-23 NOTE — PATIENT INSTRUCTIONS
1-reviewed compliance  2-demonstrating use and benefit  3-met first compliance requirements  4-moderate mask leak noted with  nasal pillows  5-switch to hybrid full face mask  6-order mask and supplies to [facility]  7-As a reminder use distilled water only in humidifier chamber. Fresh fill daily   8-Today we reviewed equipment cleaning  once weekly minimum  mask, tubing and water chamber  use dedicated container  use mild soap and water  SoClean or other ozone  are not recommended  white vinegar and water solution is no longer recommended  hang tubing to dry  mask sanitizing wipes are an option for use   9-Equipment replacement schedule : Mask cushion every month, Mask every 6 months, Head gear every 6 months, Tubing every 3 months, Ultra-fine filters 2 times per month, Humidifier chamber every 6 months  10-follow up in 4 months    11-will need to bring in chip prior to virtual visit  12-prefer virtual visit

## 2025-03-14 ENCOUNTER — APPOINTMENT (OUTPATIENT)
Dept: URBAN - METROPOLITAN AREA CLINIC 20 | Facility: CLINIC | Age: 80
Setting detail: DERMATOLOGY
End: 2025-03-14

## 2025-03-14 DIAGNOSIS — D22 MELANOCYTIC NEVI: ICD-10-CM

## 2025-03-14 DIAGNOSIS — L91.8 OTHER HYPERTROPHIC DISORDERS OF THE SKIN: ICD-10-CM

## 2025-03-14 DIAGNOSIS — D18.0 HEMANGIOMA: ICD-10-CM

## 2025-03-14 DIAGNOSIS — Z71.89 OTHER SPECIFIED COUNSELING: ICD-10-CM

## 2025-03-14 DIAGNOSIS — L82.1 OTHER SEBORRHEIC KERATOSIS: ICD-10-CM

## 2025-03-14 DIAGNOSIS — L81.4 OTHER MELANIN HYPERPIGMENTATION: ICD-10-CM

## 2025-03-14 PROBLEM — D22.5 MELANOCYTIC NEVI OF TRUNK: Status: ACTIVE | Noted: 2025-03-14

## 2025-03-14 PROBLEM — D22.61 MELANOCYTIC NEVI OF RIGHT UPPER LIMB, INCLUDING SHOULDER: Status: ACTIVE | Noted: 2025-03-14

## 2025-03-14 PROBLEM — D18.01 HEMANGIOMA OF SKIN AND SUBCUTANEOUS TISSUE: Status: ACTIVE | Noted: 2025-03-14

## 2025-03-14 PROBLEM — D22.62 MELANOCYTIC NEVI OF LEFT UPPER LIMB, INCLUDING SHOULDER: Status: ACTIVE | Noted: 2025-03-14

## 2025-03-14 PROCEDURE — ? BENIGN DESTRUCTION

## 2025-03-14 PROCEDURE — ? COUNSELING

## 2025-03-14 PROCEDURE — ? SUNSCREEN RECOMMENDATIONS

## 2025-03-14 PROCEDURE — 17110 DESTRUCTION B9 LES UP TO 14: CPT

## 2025-03-14 PROCEDURE — 99213 OFFICE O/P EST LOW 20 MIN: CPT | Mod: 25

## 2025-03-14 ASSESSMENT — LOCATION DETAILED DESCRIPTION DERM
LOCATION DETAILED: LEFT ANTERIOR PROXIMAL THIGH
LOCATION DETAILED: INFERIOR THORACIC SPINE
LOCATION DETAILED: LEFT VENTRAL PROXIMAL FOREARM
LOCATION DETAILED: SUPERIOR THORACIC SPINE
LOCATION DETAILED: RIGHT INFERIOR MEDIAL FOREHEAD
LOCATION DETAILED: RIGHT MEDIAL UPPER BACK
LOCATION DETAILED: LEFT RADIAL DORSAL HAND
LOCATION DETAILED: RIGHT RADIAL DORSAL HAND
LOCATION DETAILED: RIGHT INFERIOR UPPER BACK
LOCATION DETAILED: RIGHT PROXIMAL DORSAL FOREARM
LOCATION DETAILED: RIGHT VENTRAL DISTAL FOREARM
LOCATION DETAILED: LEFT PROXIMAL DORSAL FOREARM

## 2025-03-14 ASSESSMENT — LOCATION SIMPLE DESCRIPTION DERM
LOCATION SIMPLE: RIGHT HAND
LOCATION SIMPLE: RIGHT FOREARM
LOCATION SIMPLE: LEFT FOREARM
LOCATION SIMPLE: RIGHT FOREHEAD
LOCATION SIMPLE: RIGHT UPPER BACK
LOCATION SIMPLE: LEFT THIGH
LOCATION SIMPLE: UPPER BACK
LOCATION SIMPLE: LEFT HAND

## 2025-03-14 ASSESSMENT — LOCATION ZONE DERM
LOCATION ZONE: FACE
LOCATION ZONE: ARM
LOCATION ZONE: HAND
LOCATION ZONE: LEG
LOCATION ZONE: TRUNK

## 2025-03-14 NOTE — PROCEDURE: BENIGN DESTRUCTION
Medical Necessity Information: It is in your best interest to select a reason for this procedure from the list below. All of these items fulfill various CMS LCD requirements except the new and changing color options.
Detail Level: Detailed
Treatment Number (Will Not Render If 0): 0
Add 52 Modifier (Optional): no
Consent: The patient's consent was obtained including but not limited to risks of crusting, scabbing, blistering, scarring, darker or lighter pigmentary change, recurrence, incomplete removal and infection.
Anesthesia Volume In Cc: 0.5
Medical Necessity Clause: Recurrent
Post-Care Instructions: I reviewed with the patient in detail post-care instructions. Patient is to wear sunprotection, and avoid picking at any of the treated lesions. Pt may apply Vaseline to crusted or scabbing areas.
Anesthesia Type: 1% lidocaine without epinephrine

## (undated) DEVICE — SUTURE GENERAL

## (undated) DEVICE — ELECTRODE DUAL RETURN W/ CORD - (50/PK)

## (undated) DEVICE — TUBING CLEARLINK DUO-VENT - C-FLO (48EA/CA)

## (undated) DEVICE — SYRINGE NON SAFETY 10 CC 20 GA X 1-1/2 IN (100/BX 4BX/CA)

## (undated) DEVICE — GOWN SURGEONS X-LARGE - DISP. (30/CA)

## (undated) DEVICE — LACTATED RINGERS INJ 1000 ML - (14EA/CA 60CA/PF)

## (undated) DEVICE — CANISTER SUCTION 3000ML MECHANICAL FILTER AUTO SHUTOFF MEDI-VAC NONSTERILE LF DISP  (40EA/CA)

## (undated) DEVICE — SET EXTENSION WITH 2 PORTS (48EA/CA) ***PART #2C8610 IS A SUBSTITUTE*****

## (undated) DEVICE — ARMREST CRADLE FOAM - (2PR/PK 12PR/CA)

## (undated) DEVICE — SUTURE 0 VICRYL PLUS CT-1 - 8 X 18 INCH (12/BX)

## (undated) DEVICE — SUTURE 3-0 VICRYL PLUS RB-1 - 8 X 18 INCH (12/BX)

## (undated) DEVICE — CLOSURE WOUND 1/4 X 4 (STERI - STRIP) (50/BX 4BX/CA)

## (undated) DEVICE — DRAPE LAPAROTOMY T SHEET - (12EA/CA)

## (undated) DEVICE — GLOVE BIOGEL SZ 6.5 SURGICAL PF LTX (50PR/BX 4BX/CA)

## (undated) DEVICE — TOOL DISSECT MATCH HEAD

## (undated) DEVICE — ELECTRODE 850 FOAM ADHESIVE - HYDROGEL RADIOTRNSPRNT (50/PK)

## (undated) DEVICE — GOWN WARMING STANDARD FLEX - (30/CA)

## (undated) DEVICE — PACK JACKSON TABLE KIT W/OUT - HR (6EA/CA)

## (undated) DEVICE — SET LEADWIRE 5 LEAD BEDSIDE DISPOSABLE ECG (1SET OF 5/EA)

## (undated) DEVICE — PACK NEURO - (2EA/CA)

## (undated) DEVICE — LACTATED RINGERS INJ. 500 ML - (24EA/CA)

## (undated) DEVICE — KIT EVACUATER 3 SPRING PVC LF 1/8 DRAIN SIZE (10EA/CA)"

## (undated) DEVICE — TUBING C&T SET FLYING LEADS DRAIN TUBING (10EA/BX)

## (undated) DEVICE — DRAPE SURG STERI-DRAPE 7X11OD - (40EA/CA)

## (undated) DEVICE — SYRINGE SAFETY 10 ML 18 GA X 1 1/2 BLUNT LL (100/BX 4BX/CA)

## (undated) DEVICE — GLOVE BIOGEL SZ 7.5 SURGICAL PF LTX - (50PR/BX 4BX/CA)

## (undated) DEVICE — SPONGE GAUZESTER 4 X 4 4PLY - (128PK/CA)

## (undated) DEVICE — SUCTION INSTRUMENT YANKAUER BULBOUS TIP W/O VENT (50EA/CA)

## (undated) DEVICE — TOWEL STOP TIMEOUT SAFETY FLAG (40EA/CA)

## (undated) DEVICE — DRAPE STRLE REG TOWEL 18X24 - (10/BX 4BX/CA)"

## (undated) DEVICE — PEDIGAURD STRAIGHT 2.5 MM XS

## (undated) DEVICE — GLOVE BIOGEL SZ 8.5 SURGICAL PF LTX - (50PR/BX 4BX/CA)

## (undated) DEVICE — BOVIE BLADE COATED &INSULATED - 25/PK

## (undated) DEVICE — PROTECTOR ULNA NERVE - (36PR/CA)

## (undated) DEVICE — SODIUM CHL IRRIGATION 0.9% 1000ML (12EA/CA)

## (undated) DEVICE — NEPTUNE 4 PORT MANIFOLD - (20/PK)

## (undated) DEVICE — SYSTEM PLATELET CONCENTRATING BONE MARROW PUREBMC

## (undated) DEVICE — GLOVE BIOGEL ECLIPSE PF LATEX SIZE 8.0  (50PR/BX)

## (undated) DEVICE — HEADREST PRONEVIEW LARGE - (10/CA)

## (undated) DEVICE — KIT SURGIFLO W/OUT THROMBIN - (6EA/CA)

## (undated) DEVICE — BLADE SURGICAL CLIPPER - (50EA/CA)

## (undated) DEVICE — SENSOR SPO2 NEO LNCS ADHESIVE (20/BX) SEE USER NOTES

## (undated) DEVICE — DETERGENT RENUZYME PLUS 10 OZ PACKET (50/BX)

## (undated) DEVICE — GLOVE BIOGEL PI INDICATOR SZ 7.0 SURGICAL PF LF - (50/BX 4BX/CA)

## (undated) DEVICE — GLOVE BIOGEL PI ORTHO SZ 7.5 PF LF (40PR/BX)

## (undated) DEVICE — GLOVE BIOGEL INDICATOR SZ 7SURGICAL PF LTX - (50/BX 4BX/CA)

## (undated) DEVICE — KIT ROOM DECONTAMINATION

## (undated) DEVICE — GLOVE BIOGEL PI INDICATOR SZ 6.5 SURGICAL PF LF - (50/BX 4BX/CA)

## (undated) DEVICE — GLOVE SZ 7 BIOGEL PI MICRO - PF LF (50PR/BX 4BX/CA)

## (undated) DEVICE — GLOVE SZ 6 BIOGEL PI MICRO - PF LF (50PR/BX 4BX/CA)

## (undated) DEVICE — CHLORAPREP 26 ML APPLICATOR - ORANGE TINT(25/CA)

## (undated) DEVICE — MASK ANESTHESIA ADULT  - (100/CA)

## (undated) DEVICE — GLOVE BIOGEL INDICATOR SZ 8.5 SURGICAL PF LTX - (50/BX 4BX/CA)

## (undated) DEVICE — TRAY CATHETER FOLEY URINE METER W/STATLOCK 350ML (10EA/CA)

## (undated) DEVICE — SUTURE 2-0 VICRYL PLUS CT-1 - 8 X 18 INCH(12/BX)

## (undated) DEVICE — DRAPE MICROSCOPE ARMATEC 120IN X 46IN (10EA/CA)

## (undated) DEVICE — CLEANER ELECTRO-SURGICAL TIP - (25/BX 4BX/CA)

## (undated) DEVICE — GLOVE BIOGEL SZ 8 SURGICAL PF LTX - (50PR/BX 4BX/CA)

## (undated) DEVICE — TUBE E-T HI-LO CUFF 7.5MM (10EA/PK)

## (undated) DEVICE — KIT ANESTHESIA W/CIRCUIT & 3/LT BAG W/FILTER (20EA/CA)

## (undated) DEVICE — STERI STRIP COMPOUND BENZOIN - TINCTURE 0.6ML WITH APPLICATOR (40EA/BX)

## (undated) DEVICE — SLEEVE, VASO, THIGH, MED

## (undated) DEVICE — GLOVE SZ 6.5 BIOGEL PI MICRO - PF LF (50PR/BX)

## (undated) DEVICE — BOVIE BLADE SHORT - (150EA/CT)

## (undated) DEVICE — GLOVE BIOGEL INDICATOR SZ 8 SURGICAL PF LTX - (50/BX 4BX/CA)

## (undated) DEVICE — SYRINGE ASEPTO - (50EA/CA

## (undated) DEVICE — TOWELS CLOTH SURGICAL - (4/PK 20PK/CA)

## (undated) DEVICE — SUTURE 2-0 VICRYL CT-2  8 X 18 INCH (12EA/BX)

## (undated) DEVICE — BONE MILL BM210

## (undated) DEVICE — MIDAS LUBRICATOR DIFFUSER PACK (4EA/CA)

## (undated) DEVICE — DRESSING POST OP BORDER 4 X 10 (5EA/BX)

## (undated) DEVICE — SUTURE 1 VICRYL PLUS CTX - 8 X 18 INCH (12/BX)

## (undated) DEVICE — SCALPEL BONE 20MM BLUNT LUMBAR (5EA/BX)

## (undated) DEVICE — PATTIES SURG X-RAYCOTTONOID - 1/2 X 3 IN (200/CA)

## (undated) DEVICE — APPLICATOR COTTON TIP 6 IN - STERILE (2EA/PK 100PK/BX)

## (undated) DEVICE — SPHERE NAVIGATION STEALTH (5EA/TY 12TY/PK)

## (undated) DEVICE — BONE PRESS SPINAL EDITION HENSLER (10EA/CA)